# Patient Record
Sex: FEMALE | Race: BLACK OR AFRICAN AMERICAN | NOT HISPANIC OR LATINO | Employment: OTHER | URBAN - METROPOLITAN AREA
[De-identification: names, ages, dates, MRNs, and addresses within clinical notes are randomized per-mention and may not be internally consistent; named-entity substitution may affect disease eponyms.]

---

## 2016-07-06 LAB — CRC RECOMMENDATION EXT: NORMAL

## 2018-01-23 ENCOUNTER — HISTORICAL (OUTPATIENT)
Dept: RADIOLOGY | Facility: HOSPITAL | Age: 68
End: 2018-01-23

## 2018-02-06 ENCOUNTER — HISTORICAL (OUTPATIENT)
Dept: RADIOLOGY | Facility: HOSPITAL | Age: 68
End: 2018-02-06

## 2018-06-19 ENCOUNTER — HISTORICAL (OUTPATIENT)
Dept: CARDIOLOGY | Facility: HOSPITAL | Age: 68
End: 2018-06-19

## 2018-06-19 ENCOUNTER — HISTORICAL (OUTPATIENT)
Dept: ADMINISTRATIVE | Facility: HOSPITAL | Age: 68
End: 2018-06-19

## 2019-04-09 LAB — RAPID GROUP A STREP (OHS): POSITIVE

## 2019-09-11 ENCOUNTER — HISTORICAL (OUTPATIENT)
Dept: RADIOLOGY | Facility: HOSPITAL | Age: 69
End: 2019-09-11

## 2020-12-09 ENCOUNTER — HISTORICAL (OUTPATIENT)
Dept: RADIOLOGY | Facility: HOSPITAL | Age: 70
End: 2020-12-09

## 2021-01-04 ENCOUNTER — HISTORICAL (OUTPATIENT)
Dept: RADIOLOGY | Facility: HOSPITAL | Age: 71
End: 2021-01-04

## 2021-01-14 ENCOUNTER — HISTORICAL (OUTPATIENT)
Dept: RADIOLOGY | Facility: HOSPITAL | Age: 71
End: 2021-01-14

## 2021-02-25 ENCOUNTER — HISTORICAL (OUTPATIENT)
Dept: RADIOLOGY | Facility: HOSPITAL | Age: 71
End: 2021-02-25

## 2021-02-25 LAB — POC CREATININE: 0.9 MG/DL (ref 0.6–1.3)

## 2021-03-04 ENCOUNTER — HISTORICAL (OUTPATIENT)
Dept: RADIOLOGY | Facility: HOSPITAL | Age: 71
End: 2021-03-04

## 2021-03-04 LAB
ABS NEUT (OLG): 4.38 X10(3)/MCL (ref 2.1–9.2)
ALBUMIN SERPL-MCNC: 3.7 GM/DL (ref 3.4–4.8)
ALBUMIN/GLOB SERPL: 1.2 RATIO (ref 1.1–2)
ALP SERPL-CCNC: 109 UNIT/L (ref 40–150)
ALT SERPL-CCNC: 15 UNIT/L (ref 0–55)
AST SERPL-CCNC: 14 UNIT/L (ref 5–34)
BASOPHILS # BLD AUTO: 0 X10(3)/MCL (ref 0–0.2)
BASOPHILS NFR BLD AUTO: 0 %
BILIRUB SERPL-MCNC: 0.5 MG/DL
BILIRUBIN DIRECT+TOT PNL SERPL-MCNC: 0.2 MG/DL (ref 0–0.5)
BILIRUBIN DIRECT+TOT PNL SERPL-MCNC: 0.3 MG/DL (ref 0–0.8)
BUN SERPL-MCNC: 18.1 MG/DL (ref 9.8–20.1)
CALCIUM SERPL-MCNC: 9.3 MG/DL (ref 8.4–10.2)
CHLORIDE SERPL-SCNC: 109 MMOL/L (ref 98–107)
CO2 SERPL-SCNC: 29 MMOL/L (ref 23–31)
CREAT SERPL-MCNC: 0.95 MG/DL (ref 0.55–1.02)
EOSINOPHIL # BLD AUTO: 0.1 X10(3)/MCL (ref 0–0.9)
EOSINOPHIL NFR BLD AUTO: 2 %
ERYTHROCYTE [DISTWIDTH] IN BLOOD BY AUTOMATED COUNT: 15.3 % (ref 11.5–17)
GLOBULIN SER-MCNC: 3.2 GM/DL (ref 2.4–3.5)
GLUCOSE SERPL-MCNC: 107 MG/DL (ref 82–115)
HCT VFR BLD AUTO: 42 % (ref 37–47)
HGB BLD-MCNC: 13 GM/DL (ref 12–16)
LYMPHOCYTES # BLD AUTO: 2.2 X10(3)/MCL (ref 0.6–4.6)
LYMPHOCYTES NFR BLD AUTO: 29 %
MCH RBC QN AUTO: 28.1 PG (ref 27–31)
MCHC RBC AUTO-ENTMCNC: 31 GM/DL (ref 33–36)
MCV RBC AUTO: 90.7 FL (ref 80–94)
MONOCYTES # BLD AUTO: 0.8 X10(3)/MCL (ref 0.1–1.3)
MONOCYTES NFR BLD AUTO: 10 %
NEUTROPHILS # BLD AUTO: 4.38 X10(3)/MCL (ref 2.1–9.2)
NEUTROPHILS NFR BLD AUTO: 59 %
PLATELET # BLD AUTO: 227 X10(3)/MCL (ref 130–400)
PMV BLD AUTO: 11.6 FL (ref 9.4–12.4)
POTASSIUM SERPL-SCNC: 4.9 MMOL/L (ref 3.5–5.1)
PROT SERPL-MCNC: 6.9 GM/DL (ref 5.8–7.6)
RBC # BLD AUTO: 4.63 X10(6)/MCL (ref 4.2–5.4)
SARS-COV-2 RNA RESP QL NAA+PROBE: NOT DETECTED
SODIUM SERPL-SCNC: 148 MMOL/L (ref 136–145)
WBC # SPEC AUTO: 7.5 X10(3)/MCL (ref 4.5–11.5)

## 2021-03-08 ENCOUNTER — HISTORICAL (OUTPATIENT)
Dept: SURGERY | Facility: HOSPITAL | Age: 71
End: 2021-03-08

## 2021-03-19 ENCOUNTER — HISTORICAL (OUTPATIENT)
Dept: RADIATION THERAPY | Facility: HOSPITAL | Age: 71
End: 2021-03-19

## 2021-04-08 ENCOUNTER — HISTORICAL (OUTPATIENT)
Dept: RADIOLOGY | Facility: HOSPITAL | Age: 71
End: 2021-04-08

## 2021-04-08 LAB — BMD RECOMMENDATION EXT: NORMAL

## 2021-04-14 ENCOUNTER — HISTORICAL (OUTPATIENT)
Dept: CARDIOLOGY | Facility: HOSPITAL | Age: 71
End: 2021-04-14

## 2021-04-19 ENCOUNTER — HISTORICAL (OUTPATIENT)
Dept: RADIATION THERAPY | Facility: HOSPITAL | Age: 71
End: 2021-04-19

## 2021-05-12 ENCOUNTER — HISTORICAL (OUTPATIENT)
Dept: ADMINISTRATIVE | Facility: HOSPITAL | Age: 71
End: 2021-05-12

## 2021-05-12 LAB
ABS NEUT (OLG): 6.47 X10(3)/MCL (ref 2.1–9.2)
ALBUMIN SERPL-MCNC: 3.8 GM/DL (ref 3.4–4.8)
ALBUMIN/GLOB SERPL: 1.1 RATIO (ref 1.1–2)
ALP SERPL-CCNC: 114 UNIT/L (ref 40–150)
ALT SERPL-CCNC: 15 UNIT/L (ref 0–55)
AST SERPL-CCNC: 17 UNIT/L (ref 5–34)
BASOPHILS # BLD AUTO: 0 X10(3)/MCL (ref 0–0.2)
BASOPHILS NFR BLD AUTO: 0.1 %
BILIRUB SERPL-MCNC: 0.6 MG/DL
BILIRUBIN DIRECT+TOT PNL SERPL-MCNC: 0.3 MG/DL (ref 0–0.5)
BILIRUBIN DIRECT+TOT PNL SERPL-MCNC: 0.3 MG/DL (ref 0–0.8)
BUN SERPL-MCNC: 18.2 MG/DL (ref 9.8–20.1)
CALCIUM SERPL-MCNC: 10.1 MG/DL (ref 8.4–10.2)
CHLORIDE SERPL-SCNC: 108 MMOL/L (ref 98–107)
CO2 SERPL-SCNC: 25 MMOL/L (ref 23–31)
CREAT SERPL-MCNC: 1.08 MG/DL (ref 0.55–1.02)
EOSINOPHIL # BLD AUTO: 0.2 X10(3)/MCL (ref 0–0.9)
EOSINOPHIL NFR BLD AUTO: 1.9 %
ERYTHROCYTE [DISTWIDTH] IN BLOOD BY AUTOMATED COUNT: 14.2 % (ref 11.5–17)
GLOBULIN SER-MCNC: 3.4 GM/DL (ref 2.4–3.5)
GLUCOSE SERPL-MCNC: 124 MG/DL (ref 82–115)
HCT VFR BLD AUTO: 42.9 % (ref 37–47)
HGB BLD-MCNC: 13.2 GM/DL (ref 12–16)
LYMPHOCYTES # BLD AUTO: 2.8 X10(3)/MCL (ref 0.6–4.6)
LYMPHOCYTES NFR BLD AUTO: 27.1 %
MCH RBC QN AUTO: 27.4 PG (ref 27–31)
MCHC RBC AUTO-ENTMCNC: 30.8 GM/DL (ref 33–36)
MCV RBC AUTO: 89 FL (ref 80–94)
MONOCYTES # BLD AUTO: 0.9 X10(3)/MCL (ref 0.1–1.3)
MONOCYTES NFR BLD AUTO: 8.4 %
NEUTROPHILS # BLD AUTO: 6.5 X10(3)/MCL (ref 2.1–9.2)
NEUTROPHILS NFR BLD AUTO: 62.3 %
PLATELET # BLD AUTO: 152 X10(3)/MCL (ref 130–400)
PMV BLD AUTO: 11.2 FL (ref 9.4–12.4)
POTASSIUM SERPL-SCNC: 4.9 MMOL/L (ref 3.5–5.1)
PROT SERPL-MCNC: 7.2 GM/DL (ref 5.8–7.6)
RBC # BLD AUTO: 4.82 X10(6)/MCL (ref 4.2–5.4)
SODIUM SERPL-SCNC: 144 MMOL/L (ref 136–145)
WBC # SPEC AUTO: 10.4 X10(3)/MCL (ref 4.5–11.5)

## 2021-05-13 ENCOUNTER — HISTORICAL (OUTPATIENT)
Dept: INFUSION THERAPY | Facility: HOSPITAL | Age: 71
End: 2021-05-13

## 2021-05-20 ENCOUNTER — HISTORICAL (OUTPATIENT)
Dept: INFUSION THERAPY | Facility: HOSPITAL | Age: 71
End: 2021-05-20

## 2021-05-20 LAB
ABS NEUT (OLG): 3.4 X10(3)/MCL (ref 2.1–9.2)
ALBUMIN SERPL-MCNC: 3.3 GM/DL (ref 3.4–4.8)
ALBUMIN/GLOB SERPL: 1.1 RATIO (ref 1.1–2)
ALP SERPL-CCNC: 175 UNIT/L (ref 40–150)
ALT SERPL-CCNC: 140 UNIT/L (ref 0–55)
AST SERPL-CCNC: 70 UNIT/L (ref 5–34)
BASOPHILS # BLD AUTO: 0 X10(3)/MCL (ref 0–0.2)
BASOPHILS NFR BLD AUTO: 0.2 %
BILIRUB SERPL-MCNC: 0.5 MG/DL
BILIRUBIN DIRECT+TOT PNL SERPL-MCNC: 0.2 MG/DL (ref 0–0.5)
BILIRUBIN DIRECT+TOT PNL SERPL-MCNC: 0.3 MG/DL (ref 0–0.8)
BUN SERPL-MCNC: 12.5 MG/DL (ref 9.8–20.1)
CALCIUM SERPL-MCNC: 9 MG/DL (ref 8.4–10.2)
CHLORIDE SERPL-SCNC: 105 MMOL/L (ref 98–107)
CO2 SERPL-SCNC: 27 MMOL/L (ref 23–31)
CREAT SERPL-MCNC: 1.05 MG/DL (ref 0.55–1.02)
EOSINOPHIL # BLD AUTO: 0.1 X10(3)/MCL (ref 0–0.9)
EOSINOPHIL NFR BLD AUTO: 1.8 %
ERYTHROCYTE [DISTWIDTH] IN BLOOD BY AUTOMATED COUNT: 13.5 % (ref 11.5–17)
GLOBULIN SER-MCNC: 3 GM/DL (ref 2.4–3.5)
GLUCOSE SERPL-MCNC: 142 MG/DL (ref 82–115)
HCT VFR BLD AUTO: 38.8 % (ref 37–47)
HGB BLD-MCNC: 12.4 GM/DL (ref 12–16)
LYMPHOCYTES # BLD AUTO: 2.3 X10(3)/MCL (ref 0.6–4.6)
LYMPHOCYTES NFR BLD AUTO: 36.5 %
MCH RBC QN AUTO: 27.7 PG (ref 27–31)
MCHC RBC AUTO-ENTMCNC: 32 GM/DL (ref 33–36)
MCV RBC AUTO: 86.8 FL (ref 80–94)
MONOCYTES # BLD AUTO: 0.4 X10(3)/MCL (ref 0.1–1.3)
MONOCYTES NFR BLD AUTO: 7 %
NEUTROPHILS # BLD AUTO: 3.4 X10(3)/MCL (ref 2.1–9.2)
NEUTROPHILS NFR BLD AUTO: 54.2 %
PLATELET # BLD AUTO: 233 X10(3)/MCL (ref 130–400)
PMV BLD AUTO: 11.1 FL (ref 9.4–12.4)
POTASSIUM SERPL-SCNC: 4.5 MMOL/L (ref 3.5–5.1)
PROT SERPL-MCNC: 6.3 GM/DL (ref 5.8–7.6)
RBC # BLD AUTO: 4.47 X10(6)/MCL (ref 4.2–5.4)
SODIUM SERPL-SCNC: 140 MMOL/L (ref 136–145)
WBC # SPEC AUTO: 6.3 X10(3)/MCL (ref 4.5–11.5)

## 2021-05-26 ENCOUNTER — HISTORICAL (OUTPATIENT)
Dept: ADMINISTRATIVE | Facility: HOSPITAL | Age: 71
End: 2021-05-26

## 2021-05-26 LAB
ABS NEUT (OLG): 4.14 X10(3)/MCL (ref 2.1–9.2)
ALBUMIN SERPL-MCNC: 3.4 GM/DL (ref 3.4–4.8)
ALBUMIN/GLOB SERPL: 1.2 RATIO (ref 1.1–2)
ALP SERPL-CCNC: 113 UNIT/L (ref 40–150)
ALT SERPL-CCNC: 50 UNIT/L (ref 0–55)
AST SERPL-CCNC: 24 UNIT/L (ref 5–34)
BASOPHILS # BLD AUTO: 0 X10(3)/MCL (ref 0–0.2)
BASOPHILS NFR BLD AUTO: 0.2 %
BILIRUB SERPL-MCNC: 0.5 MG/DL
BILIRUBIN DIRECT+TOT PNL SERPL-MCNC: 0.2 MG/DL (ref 0–0.8)
BILIRUBIN DIRECT+TOT PNL SERPL-MCNC: 0.3 MG/DL (ref 0–0.5)
BUN SERPL-MCNC: 14.1 MG/DL (ref 9.8–20.1)
CALCIUM SERPL-MCNC: 9.3 MG/DL (ref 8.4–10.2)
CHLORIDE SERPL-SCNC: 103 MMOL/L (ref 98–107)
CO2 SERPL-SCNC: 28 MMOL/L (ref 23–31)
CREAT SERPL-MCNC: 0.9 MG/DL (ref 0.55–1.02)
EOSINOPHIL # BLD AUTO: 0.1 X10(3)/MCL (ref 0–0.9)
EOSINOPHIL NFR BLD AUTO: 1.7 %
ERYTHROCYTE [DISTWIDTH] IN BLOOD BY AUTOMATED COUNT: 13.6 % (ref 11.5–17)
GLOBULIN SER-MCNC: 2.9 GM/DL (ref 2.4–3.5)
GLUCOSE SERPL-MCNC: 183 MG/DL (ref 82–115)
HCT VFR BLD AUTO: 39.2 % (ref 37–47)
HGB BLD-MCNC: 12.7 GM/DL (ref 12–16)
LYMPHOCYTES # BLD AUTO: 1.9 X10(3)/MCL (ref 0.6–4.6)
LYMPHOCYTES NFR BLD AUTO: 29.2 %
MCH RBC QN AUTO: 28.1 PG (ref 27–31)
MCHC RBC AUTO-ENTMCNC: 32.4 GM/DL (ref 33–36)
MCV RBC AUTO: 86.7 FL (ref 80–94)
MONOCYTES # BLD AUTO: 0.3 X10(3)/MCL (ref 0.1–1.3)
MONOCYTES NFR BLD AUTO: 5.2 %
NEUTROPHILS # BLD AUTO: 4.1 X10(3)/MCL (ref 2.1–9.2)
NEUTROPHILS NFR BLD AUTO: 63.1 %
PLATELET # BLD AUTO: 296 X10(3)/MCL (ref 130–400)
PMV BLD AUTO: 10.4 FL (ref 9.4–12.4)
POTASSIUM SERPL-SCNC: 4.7 MMOL/L (ref 3.5–5.1)
PROT SERPL-MCNC: 6.3 GM/DL (ref 5.8–7.6)
RBC # BLD AUTO: 4.52 X10(6)/MCL (ref 4.2–5.4)
SODIUM SERPL-SCNC: 140 MMOL/L (ref 136–145)
WBC # SPEC AUTO: 6.6 X10(3)/MCL (ref 4.5–11.5)

## 2021-05-27 ENCOUNTER — HISTORICAL (OUTPATIENT)
Dept: INFUSION THERAPY | Facility: HOSPITAL | Age: 71
End: 2021-05-27

## 2021-06-03 ENCOUNTER — HISTORICAL (OUTPATIENT)
Dept: INFUSION THERAPY | Facility: HOSPITAL | Age: 71
End: 2021-06-03

## 2021-06-03 LAB
ABS NEUT (OLG): 3.85 X10(3)/MCL (ref 2.1–9.2)
ANION GAP SERPL CALC-SCNC: 13 MMOL/L
BASOPHILS # BLD AUTO: 0 X10(3)/MCL (ref 0–0.2)
BASOPHILS NFR BLD AUTO: 0.3 %
BUN SERPL-MCNC: 12 MG/DL (ref 8–26)
CHLORIDE SERPL-SCNC: 103 MMOL/L (ref 98–109)
CREAT SERPL-MCNC: 1 MG/DL (ref 0.6–1.3)
EOSINOPHIL # BLD AUTO: 0.1 X10(3)/MCL (ref 0–0.9)
EOSINOPHIL NFR BLD AUTO: 1.9 %
ERYTHROCYTE [DISTWIDTH] IN BLOOD BY AUTOMATED COUNT: 14.1 % (ref 11.5–17)
GLUCOSE SERPL-MCNC: 151 MG/DL (ref 70–105)
HCT VFR BLD AUTO: 36.6 % (ref 37–47)
HCT VFR BLD CALC: 38 % (ref 38–51)
HGB BLD-MCNC: 11.9 GM/DL (ref 12–16)
HGB BLD-MCNC: 12.9 MG/DL (ref 12–17)
LYMPHOCYTES # BLD AUTO: 2.2 X10(3)/MCL (ref 0.6–4.6)
LYMPHOCYTES NFR BLD AUTO: 32.8 %
MCH RBC QN AUTO: 27.8 PG (ref 27–31)
MCHC RBC AUTO-ENTMCNC: 32.5 GM/DL (ref 33–36)
MCV RBC AUTO: 85.5 FL (ref 80–94)
MONOCYTES # BLD AUTO: 0.4 X10(3)/MCL (ref 0.1–1.3)
MONOCYTES NFR BLD AUTO: 6.3 %
NEUTROPHILS # BLD AUTO: 3.8 X10(3)/MCL (ref 2.1–9.2)
NEUTROPHILS NFR BLD AUTO: 57.5 %
PLATELET # BLD AUTO: 315 X10(3)/MCL (ref 130–400)
PMV BLD AUTO: 9.5 FL (ref 9.4–12.4)
POC IONIZED CALCIUM: 1.2 MMOL/L (ref 1.12–1.32)
POC TCO2: 29 MMOL/L (ref 24–29)
POTASSIUM BLD-SCNC: 4.5 MMOL/L (ref 3.5–4.9)
RBC # BLD AUTO: 4.28 X10(6)/MCL (ref 4.2–5.4)
SODIUM BLD-SCNC: 139 MMOL/L (ref 138–146)
WBC # SPEC AUTO: 6.7 X10(3)/MCL (ref 4.5–11.5)

## 2021-06-10 ENCOUNTER — HISTORICAL (OUTPATIENT)
Dept: INFUSION THERAPY | Facility: HOSPITAL | Age: 71
End: 2021-06-10

## 2021-06-10 LAB
ABS NEUT (OLG): 5.21 X10(3)/MCL (ref 2.1–9.2)
ALBUMIN SERPL-MCNC: 2.9 GM/DL (ref 3.4–4.8)
ALBUMIN/GLOB SERPL: 0.9 RATIO (ref 1.1–2)
ALP SERPL-CCNC: 148 UNIT/L (ref 40–150)
ALT SERPL-CCNC: 104 UNIT/L (ref 0–55)
AST SERPL-CCNC: 37 UNIT/L (ref 5–34)
BASOPHILS # BLD AUTO: 0 X10(3)/MCL (ref 0–0.2)
BASOPHILS NFR BLD AUTO: 0.2 %
BILIRUB SERPL-MCNC: 0.4 MG/DL
BILIRUBIN DIRECT+TOT PNL SERPL-MCNC: 0.2 MG/DL (ref 0–0.5)
BILIRUBIN DIRECT+TOT PNL SERPL-MCNC: 0.2 MG/DL (ref 0–0.8)
BUN SERPL-MCNC: 12.5 MG/DL (ref 9.8–20.1)
CALCIUM SERPL-MCNC: 9.3 MG/DL (ref 8.4–10.2)
CHLORIDE SERPL-SCNC: 102 MMOL/L (ref 98–107)
CO2 SERPL-SCNC: 25 MMOL/L (ref 23–31)
CREAT SERPL-MCNC: 1.01 MG/DL (ref 0.55–1.02)
EOSINOPHIL # BLD AUTO: 0.1 X10(3)/MCL (ref 0–0.9)
EOSINOPHIL NFR BLD AUTO: 1.7 %
ERYTHROCYTE [DISTWIDTH] IN BLOOD BY AUTOMATED COUNT: 14.5 % (ref 11.5–17)
GLOBULIN SER-MCNC: 3.2 GM/DL (ref 2.4–3.5)
GLUCOSE SERPL-MCNC: 157 MG/DL (ref 82–115)
HCT VFR BLD AUTO: 38 % (ref 37–47)
HGB BLD-MCNC: 12.4 GM/DL (ref 12–16)
LYMPHOCYTES # BLD AUTO: 2.3 X10(3)/MCL (ref 0.6–4.6)
LYMPHOCYTES NFR BLD AUTO: 27 %
MCH RBC QN AUTO: 27.7 PG (ref 27–31)
MCHC RBC AUTO-ENTMCNC: 32.6 GM/DL (ref 33–36)
MCV RBC AUTO: 85 FL (ref 80–94)
MONOCYTES # BLD AUTO: 0.7 X10(3)/MCL (ref 0.1–1.3)
MONOCYTES NFR BLD AUTO: 7.9 %
NEUTROPHILS # BLD AUTO: 5.2 X10(3)/MCL (ref 2.1–9.2)
NEUTROPHILS NFR BLD AUTO: 61.8 %
PLATELET # BLD AUTO: 307 X10(3)/MCL (ref 130–400)
PMV BLD AUTO: 10.7 FL (ref 9.4–12.4)
POTASSIUM SERPL-SCNC: 4.3 MMOL/L (ref 3.5–5.1)
PROT SERPL-MCNC: 6.1 GM/DL (ref 5.8–7.6)
RBC # BLD AUTO: 4.47 X10(6)/MCL (ref 4.2–5.4)
SODIUM SERPL-SCNC: 141 MMOL/L (ref 136–145)
WBC # SPEC AUTO: 8.4 X10(3)/MCL (ref 4.5–11.5)

## 2021-06-16 ENCOUNTER — HISTORICAL (OUTPATIENT)
Dept: ADMINISTRATIVE | Facility: HOSPITAL | Age: 71
End: 2021-06-16

## 2021-06-16 LAB
ABS NEUT (OLG): 2.98 X10(3)/MCL (ref 2.1–9.2)
ALBUMIN SERPL-MCNC: 3 GM/DL (ref 3.4–4.8)
ALBUMIN/GLOB SERPL: 1 RATIO (ref 1.1–2)
ALP SERPL-CCNC: 115 UNIT/L (ref 40–150)
ALT SERPL-CCNC: 46 UNIT/L (ref 0–55)
AST SERPL-CCNC: 26 UNIT/L (ref 5–34)
BASOPHILS # BLD AUTO: 0 X10(3)/MCL (ref 0–0.2)
BASOPHILS NFR BLD AUTO: 0.4 %
BILIRUB SERPL-MCNC: 0.4 MG/DL
BILIRUBIN DIRECT+TOT PNL SERPL-MCNC: 0.2 MG/DL (ref 0–0.5)
BILIRUBIN DIRECT+TOT PNL SERPL-MCNC: 0.2 MG/DL (ref 0–0.8)
BUN SERPL-MCNC: 14.1 MG/DL (ref 9.8–20.1)
CALCIUM SERPL-MCNC: 9.4 MG/DL (ref 8.4–10.2)
CHLORIDE SERPL-SCNC: 104 MMOL/L (ref 98–107)
CO2 SERPL-SCNC: 29 MMOL/L (ref 23–31)
CREAT SERPL-MCNC: 0.84 MG/DL (ref 0.55–1.02)
EOSINOPHIL # BLD AUTO: 0 X10(3)/MCL (ref 0–0.9)
EOSINOPHIL NFR BLD AUTO: 0.8 %
ERYTHROCYTE [DISTWIDTH] IN BLOOD BY AUTOMATED COUNT: 14.7 % (ref 11.5–17)
GLOBULIN SER-MCNC: 3 GM/DL (ref 2.4–3.5)
GLUCOSE SERPL-MCNC: 127 MG/DL (ref 82–115)
HCT VFR BLD AUTO: 36.9 % (ref 37–47)
HGB BLD-MCNC: 12 GM/DL (ref 12–16)
LYMPHOCYTES # BLD AUTO: 1.7 X10(3)/MCL (ref 0.6–4.6)
LYMPHOCYTES NFR BLD AUTO: 32.4 %
MCH RBC QN AUTO: 27.8 PG (ref 27–31)
MCHC RBC AUTO-ENTMCNC: 32.5 GM/DL (ref 33–36)
MCV RBC AUTO: 85.6 FL (ref 80–94)
MONOCYTES # BLD AUTO: 0.5 X10(3)/MCL (ref 0.1–1.3)
MONOCYTES NFR BLD AUTO: 9.3 %
NEUTROPHILS # BLD AUTO: 3 X10(3)/MCL (ref 2.1–9.2)
NEUTROPHILS NFR BLD AUTO: 56.3 %
PLATELET # BLD AUTO: 278 X10(3)/MCL (ref 130–400)
PMV BLD AUTO: 9.7 FL (ref 9.4–12.4)
POTASSIUM SERPL-SCNC: 4.9 MMOL/L (ref 3.5–5.1)
PROT SERPL-MCNC: 6 GM/DL (ref 5.8–7.6)
RBC # BLD AUTO: 4.31 X10(6)/MCL (ref 4.2–5.4)
SODIUM SERPL-SCNC: 140 MMOL/L (ref 136–145)
WBC # SPEC AUTO: 5.3 X10(3)/MCL (ref 4.5–11.5)

## 2021-06-17 ENCOUNTER — HISTORICAL (OUTPATIENT)
Dept: INFUSION THERAPY | Facility: HOSPITAL | Age: 71
End: 2021-06-17

## 2021-06-24 ENCOUNTER — HISTORICAL (OUTPATIENT)
Dept: INFUSION THERAPY | Facility: HOSPITAL | Age: 71
End: 2021-06-24

## 2021-06-24 LAB
ABS NEUT (OLG): 4.04 X10(3)/MCL (ref 2.1–9.2)
ALBUMIN SERPL-MCNC: 3 GM/DL (ref 3.4–4.8)
ALBUMIN/GLOB SERPL: 1.1 RATIO (ref 1.1–2)
ALP SERPL-CCNC: 100 UNIT/L (ref 40–150)
ALT SERPL-CCNC: 34 UNIT/L (ref 0–55)
AST SERPL-CCNC: 23 UNIT/L (ref 5–34)
BASOPHILS # BLD AUTO: 0 X10(3)/MCL (ref 0–0.2)
BASOPHILS NFR BLD AUTO: 0.5 %
BILIRUB SERPL-MCNC: 0.3 MG/DL
BILIRUBIN DIRECT+TOT PNL SERPL-MCNC: 0.1 MG/DL (ref 0–0.8)
BILIRUBIN DIRECT+TOT PNL SERPL-MCNC: 0.2 MG/DL (ref 0–0.5)
BUN SERPL-MCNC: 12.2 MG/DL (ref 9.8–20.1)
CALCIUM SERPL-MCNC: 9.3 MG/DL (ref 8.4–10.2)
CHLORIDE SERPL-SCNC: 106 MMOL/L (ref 98–107)
CO2 SERPL-SCNC: 28 MMOL/L (ref 23–31)
CREAT SERPL-MCNC: 1 MG/DL (ref 0.55–1.02)
EOSINOPHIL # BLD AUTO: 0.1 X10(3)/MCL (ref 0–0.9)
EOSINOPHIL NFR BLD AUTO: 1.2 %
ERYTHROCYTE [DISTWIDTH] IN BLOOD BY AUTOMATED COUNT: 16.2 % (ref 11.5–17)
GLOBULIN SER-MCNC: 2.8 GM/DL (ref 2.4–3.5)
GLUCOSE SERPL-MCNC: 210 MG/DL (ref 82–115)
HCT VFR BLD AUTO: 35.5 % (ref 37–47)
HGB BLD-MCNC: 11.3 GM/DL (ref 12–16)
LYMPHOCYTES # BLD AUTO: 2.5 X10(3)/MCL (ref 0.6–4.6)
LYMPHOCYTES NFR BLD AUTO: 34.2 %
MCH RBC QN AUTO: 27.7 PG (ref 27–31)
MCHC RBC AUTO-ENTMCNC: 31.8 GM/DL (ref 33–36)
MCV RBC AUTO: 87 FL (ref 80–94)
MONOCYTES # BLD AUTO: 0.6 X10(3)/MCL (ref 0.1–1.3)
MONOCYTES NFR BLD AUTO: 7.6 %
NEUTROPHILS # BLD AUTO: 4 X10(3)/MCL (ref 2.1–9.2)
NEUTROPHILS NFR BLD AUTO: 55.5 %
PLATELET # BLD AUTO: 305 X10(3)/MCL (ref 130–400)
PMV BLD AUTO: 9.7 FL (ref 9.4–12.4)
POTASSIUM SERPL-SCNC: 4.8 MMOL/L (ref 3.5–5.1)
PROT SERPL-MCNC: 5.8 GM/DL (ref 5.8–7.6)
RBC # BLD AUTO: 4.08 X10(6)/MCL (ref 4.2–5.4)
SODIUM SERPL-SCNC: 143 MMOL/L (ref 136–145)
WBC # SPEC AUTO: 7.3 X10(3)/MCL (ref 4.5–11.5)

## 2021-07-01 ENCOUNTER — HISTORICAL (OUTPATIENT)
Dept: INFUSION THERAPY | Facility: HOSPITAL | Age: 71
End: 2021-07-01

## 2021-07-01 LAB
ABS NEUT (OLG): 3.49 X10(3)/MCL (ref 2.1–9.2)
ALBUMIN SERPL-MCNC: 3.2 GM/DL (ref 3.4–4.8)
ALBUMIN/GLOB SERPL: 1.2 RATIO (ref 1.1–2)
ALP SERPL-CCNC: 99 UNIT/L (ref 40–150)
ALT SERPL-CCNC: 33 UNIT/L (ref 0–55)
AST SERPL-CCNC: 25 UNIT/L (ref 5–34)
BASOPHILS # BLD AUTO: 0 X10(3)/MCL (ref 0–0.2)
BASOPHILS NFR BLD AUTO: 0.3 %
BILIRUB SERPL-MCNC: 0.3 MG/DL
BILIRUBIN DIRECT+TOT PNL SERPL-MCNC: 0.1 MG/DL (ref 0–0.5)
BILIRUBIN DIRECT+TOT PNL SERPL-MCNC: 0.2 MG/DL (ref 0–0.8)
BUN SERPL-MCNC: 11.2 MG/DL (ref 9.8–20.1)
CALCIUM SERPL-MCNC: 9.6 MG/DL (ref 8.4–10.2)
CHLORIDE SERPL-SCNC: 107 MMOL/L (ref 98–107)
CO2 SERPL-SCNC: 29 MMOL/L (ref 23–31)
CREAT SERPL-MCNC: 0.86 MG/DL (ref 0.55–1.02)
EOSINOPHIL # BLD AUTO: 0.1 X10(3)/MCL (ref 0–0.9)
EOSINOPHIL NFR BLD AUTO: 1.4 %
ERYTHROCYTE [DISTWIDTH] IN BLOOD BY AUTOMATED COUNT: 17.2 % (ref 11.5–17)
GLOBULIN SER-MCNC: 2.7 GM/DL (ref 2.4–3.5)
GLUCOSE SERPL-MCNC: 134 MG/DL (ref 82–115)
HCT VFR BLD AUTO: 34.7 % (ref 37–47)
HGB BLD-MCNC: 11.1 GM/DL (ref 12–16)
LYMPHOCYTES # BLD AUTO: 2.4 X10(3)/MCL (ref 0.6–4.6)
LYMPHOCYTES NFR BLD AUTO: 35.8 %
MCH RBC QN AUTO: 27.6 PG (ref 27–31)
MCHC RBC AUTO-ENTMCNC: 32 GM/DL (ref 33–36)
MCV RBC AUTO: 86.3 FL (ref 80–94)
MONOCYTES # BLD AUTO: 0.6 X10(3)/MCL (ref 0.1–1.3)
MONOCYTES NFR BLD AUTO: 8.8 %
NEUTROPHILS # BLD AUTO: 3.5 X10(3)/MCL (ref 2.1–9.2)
NEUTROPHILS NFR BLD AUTO: 52.9 %
PLATELET # BLD AUTO: 267 X10(3)/MCL (ref 130–400)
PMV BLD AUTO: 9.6 FL (ref 9.4–12.4)
POTASSIUM SERPL-SCNC: 4.6 MMOL/L (ref 3.5–5.1)
PROT SERPL-MCNC: 5.9 GM/DL (ref 5.8–7.6)
RBC # BLD AUTO: 4.02 X10(6)/MCL (ref 4.2–5.4)
SODIUM SERPL-SCNC: 143 MMOL/L (ref 136–145)
WBC # SPEC AUTO: 6.6 X10(3)/MCL (ref 4.5–11.5)

## 2021-07-07 ENCOUNTER — HISTORICAL (OUTPATIENT)
Dept: ADMINISTRATIVE | Facility: HOSPITAL | Age: 71
End: 2021-07-07

## 2021-07-07 LAB
ABS NEUT (OLG): 5.44 X10(3)/MCL (ref 2.1–9.2)
ALBUMIN SERPL-MCNC: 3.2 GM/DL (ref 3.4–4.8)
ALBUMIN/GLOB SERPL: 1.1 RATIO (ref 1.1–2)
ALP SERPL-CCNC: 97 UNIT/L (ref 40–150)
ALT SERPL-CCNC: 35 UNIT/L (ref 0–55)
AST SERPL-CCNC: 25 UNIT/L (ref 5–34)
BASOPHILS # BLD AUTO: 0 X10(3)/MCL (ref 0–0.2)
BASOPHILS NFR BLD AUTO: 0.2 %
BILIRUB SERPL-MCNC: 0.5 MG/DL
BILIRUBIN DIRECT+TOT PNL SERPL-MCNC: 0.2 MG/DL (ref 0–0.5)
BILIRUBIN DIRECT+TOT PNL SERPL-MCNC: 0.3 MG/DL (ref 0–0.8)
BUN SERPL-MCNC: 13.4 MG/DL (ref 9.8–20.1)
CALCIUM SERPL-MCNC: 9.6 MG/DL (ref 8.4–10.2)
CHLORIDE SERPL-SCNC: 106 MMOL/L (ref 98–107)
CO2 SERPL-SCNC: 28 MMOL/L (ref 23–31)
CREAT SERPL-MCNC: 0.95 MG/DL (ref 0.55–1.02)
EOSINOPHIL # BLD AUTO: 0.1 X10(3)/MCL (ref 0–0.9)
EOSINOPHIL NFR BLD AUTO: 0.7 %
ERYTHROCYTE [DISTWIDTH] IN BLOOD BY AUTOMATED COUNT: 18.1 % (ref 11.5–17)
GLOBULIN SER-MCNC: 2.8 GM/DL (ref 2.4–3.5)
GLUCOSE SERPL-MCNC: 120 MG/DL (ref 82–115)
HCT VFR BLD AUTO: 35.2 % (ref 37–47)
HGB BLD-MCNC: 11.3 GM/DL (ref 12–16)
LYMPHOCYTES # BLD AUTO: 3 X10(3)/MCL (ref 0.6–4.6)
LYMPHOCYTES NFR BLD AUTO: 32.9 %
MCH RBC QN AUTO: 27.8 PG (ref 27–31)
MCHC RBC AUTO-ENTMCNC: 32.1 GM/DL (ref 33–36)
MCV RBC AUTO: 86.7 FL (ref 80–94)
MONOCYTES # BLD AUTO: 0.4 X10(3)/MCL (ref 0.1–1.3)
MONOCYTES NFR BLD AUTO: 4.8 %
NEUTROPHILS # BLD AUTO: 5.4 X10(3)/MCL (ref 2.1–9.2)
NEUTROPHILS NFR BLD AUTO: 60.6 %
PLATELET # BLD AUTO: 272 X10(3)/MCL (ref 130–400)
PMV BLD AUTO: 9.6 FL (ref 9.4–12.4)
POTASSIUM SERPL-SCNC: 5 MMOL/L (ref 3.5–5.1)
PROT SERPL-MCNC: 6 GM/DL (ref 5.8–7.6)
RBC # BLD AUTO: 4.06 X10(6)/MCL (ref 4.2–5.4)
SODIUM SERPL-SCNC: 142 MMOL/L (ref 136–145)
WBC # SPEC AUTO: 9 X10(3)/MCL (ref 4.5–11.5)

## 2021-07-08 ENCOUNTER — HISTORICAL (OUTPATIENT)
Dept: INFUSION THERAPY | Facility: HOSPITAL | Age: 71
End: 2021-07-08

## 2021-07-15 ENCOUNTER — HISTORICAL (OUTPATIENT)
Dept: INFUSION THERAPY | Facility: HOSPITAL | Age: 71
End: 2021-07-15

## 2021-07-15 LAB
ABS NEUT (OLG): 3.15 X10(3)/MCL (ref 2.1–9.2)
ANION GAP SERPL CALC-SCNC: 20 MMOL/L
BASOPHILS # BLD AUTO: 0 X10(3)/MCL (ref 0–0.2)
BASOPHILS NFR BLD AUTO: 0.3 %
BUN SERPL-MCNC: 17 MG/DL (ref 8–26)
CHLORIDE SERPL-SCNC: 100 MMOL/L (ref 98–109)
CREAT SERPL-MCNC: 1.2 MG/DL (ref 0.6–1.3)
EOSINOPHIL # BLD AUTO: 0.1 X10(3)/MCL (ref 0–0.9)
EOSINOPHIL NFR BLD AUTO: 1.4 %
ERYTHROCYTE [DISTWIDTH] IN BLOOD BY AUTOMATED COUNT: 17.9 % (ref 11.5–17)
GLUCOSE SERPL-MCNC: 177 MG/DL (ref 70–105)
HCT VFR BLD AUTO: 32.8 % (ref 37–47)
HCT VFR BLD CALC: 34 % (ref 38–51)
HGB BLD-MCNC: 10.8 GM/DL (ref 12–16)
HGB BLD-MCNC: 11.6 MG/DL (ref 12–17)
LYMPHOCYTES # BLD AUTO: 2.8 X10(3)/MCL (ref 0.6–4.6)
LYMPHOCYTES NFR BLD AUTO: 43.6 %
MCH RBC QN AUTO: 28.2 PG (ref 27–31)
MCHC RBC AUTO-ENTMCNC: 32.9 GM/DL (ref 33–36)
MCV RBC AUTO: 85.6 FL (ref 80–94)
MONOCYTES # BLD AUTO: 0.4 X10(3)/MCL (ref 0.1–1.3)
MONOCYTES NFR BLD AUTO: 5.7 %
NEUTROPHILS # BLD AUTO: 3.2 X10(3)/MCL (ref 2.1–9.2)
NEUTROPHILS NFR BLD AUTO: 48.4 %
PLATELET # BLD AUTO: 251 X10(3)/MCL (ref 130–400)
PMV BLD AUTO: 9.9 FL (ref 9.4–12.4)
POC IONIZED CALCIUM: 1.23 MMOL/L (ref 1.12–1.32)
POC TCO2: 25 MMOL/L (ref 24–29)
POTASSIUM BLD-SCNC: 3.9 MMOL/L (ref 3.5–4.9)
RBC # BLD AUTO: 3.83 X10(6)/MCL (ref 4.2–5.4)
SODIUM BLD-SCNC: 140 MMOL/L (ref 138–146)
WBC # SPEC AUTO: 6.5 X10(3)/MCL (ref 4.5–11.5)

## 2021-08-04 ENCOUNTER — HISTORICAL (OUTPATIENT)
Dept: ADMINISTRATIVE | Facility: HOSPITAL | Age: 71
End: 2021-08-04

## 2021-08-04 LAB
ABS NEUT (OLG): 4.59 X10(3)/MCL (ref 2.1–9.2)
ANION GAP SERPL CALC-SCNC: 17 MMOL/L
BASOPHILS # BLD AUTO: 0 X10(3)/MCL (ref 0–0.2)
BASOPHILS NFR BLD AUTO: 0.4 %
BUN SERPL-MCNC: 19 MG/DL (ref 8–26)
CHLORIDE SERPL-SCNC: 104 MMOL/L (ref 98–109)
CREAT SERPL-MCNC: 0.9 MG/DL (ref 0.6–1.3)
EOSINOPHIL # BLD AUTO: 0.2 X10(3)/MCL (ref 0–0.9)
EOSINOPHIL NFR BLD AUTO: 2 %
ERYTHROCYTE [DISTWIDTH] IN BLOOD BY AUTOMATED COUNT: 18.5 % (ref 11.5–17)
GLUCOSE SERPL-MCNC: 157 MG/DL (ref 70–105)
HCT VFR BLD AUTO: 33.7 % (ref 37–47)
HCT VFR BLD CALC: 33 % (ref 38–51)
HGB BLD-MCNC: 10.6 GM/DL (ref 12–16)
HGB BLD-MCNC: 11.2 MG/DL (ref 12–17)
LYMPHOCYTES # BLD AUTO: 3.6 X10(3)/MCL (ref 0.6–4.6)
LYMPHOCYTES NFR BLD AUTO: 38.3 %
MCH RBC QN AUTO: 28 PG (ref 27–31)
MCHC RBC AUTO-ENTMCNC: 31.5 GM/DL (ref 33–36)
MCV RBC AUTO: 89.2 FL (ref 80–94)
MONOCYTES # BLD AUTO: 0.9 X10(3)/MCL (ref 0.1–1.3)
MONOCYTES NFR BLD AUTO: 9.5 %
NEUTROPHILS # BLD AUTO: 4.6 X10(3)/MCL (ref 2.1–9.2)
NEUTROPHILS NFR BLD AUTO: 49.5 %
PLATELET # BLD AUTO: 305 X10(3)/MCL (ref 130–400)
PMV BLD AUTO: 8.7 FL (ref 9.4–12.4)
POC IONIZED CALCIUM: 1.24 MMOL/L (ref 1.12–1.32)
POC TCO2: 27 MMOL/L (ref 24–29)
POTASSIUM BLD-SCNC: 4 MMOL/L (ref 3.5–4.9)
RBC # BLD AUTO: 3.78 X10(6)/MCL (ref 4.2–5.4)
SODIUM BLD-SCNC: 143 MMOL/L (ref 138–146)
WBC # SPEC AUTO: 9.3 X10(3)/MCL (ref 4.5–11.5)

## 2021-08-09 ENCOUNTER — HISTORICAL (OUTPATIENT)
Dept: RADIATION THERAPY | Facility: HOSPITAL | Age: 71
End: 2021-08-09

## 2021-08-12 ENCOUNTER — HISTORICAL (OUTPATIENT)
Dept: INFUSION THERAPY | Facility: HOSPITAL | Age: 71
End: 2021-08-12

## 2021-08-19 ENCOUNTER — HISTORICAL (OUTPATIENT)
Dept: INFUSION THERAPY | Facility: HOSPITAL | Age: 71
End: 2021-08-19

## 2021-08-19 LAB
ABS NEUT (OLG): 4.97 X10(3)/MCL (ref 2.1–9.2)
ALBUMIN SERPL-MCNC: 3.5 GM/DL (ref 3.4–4.8)
ALBUMIN/GLOB SERPL: 1.2 RATIO (ref 1.1–2)
ALP SERPL-CCNC: 91 UNIT/L (ref 40–150)
ALT SERPL-CCNC: 26 UNIT/L (ref 0–55)
AST SERPL-CCNC: 20 UNIT/L (ref 5–34)
BASOPHILS # BLD AUTO: 0 X10(3)/MCL (ref 0–0.2)
BASOPHILS NFR BLD AUTO: 0.2 %
BILIRUB SERPL-MCNC: 0.7 MG/DL
BILIRUBIN DIRECT+TOT PNL SERPL-MCNC: 0.3 MG/DL (ref 0–0.5)
BILIRUBIN DIRECT+TOT PNL SERPL-MCNC: 0.4 MG/DL (ref 0–0.8)
BUN SERPL-MCNC: 18.1 MG/DL (ref 9.8–20.1)
CALCIUM SERPL-MCNC: 10 MG/DL (ref 8.4–10.2)
CHLORIDE SERPL-SCNC: 105 MMOL/L (ref 98–107)
CO2 SERPL-SCNC: 26 MMOL/L (ref 23–31)
CREAT SERPL-MCNC: 1.14 MG/DL (ref 0.55–1.02)
EOSINOPHIL # BLD AUTO: 0.2 X10(3)/MCL (ref 0–0.9)
EOSINOPHIL NFR BLD AUTO: 2.9 %
ERYTHROCYTE [DISTWIDTH] IN BLOOD BY AUTOMATED COUNT: 17.7 % (ref 11.5–17)
GLOBULIN SER-MCNC: 3 GM/DL (ref 2.4–3.5)
GLUCOSE SERPL-MCNC: 148 MG/DL (ref 82–115)
HCT VFR BLD AUTO: 33.4 % (ref 37–47)
HGB BLD-MCNC: 10.3 GM/DL (ref 12–16)
LYMPHOCYTES # BLD AUTO: 2.8 X10(3)/MCL (ref 0.6–4.6)
LYMPHOCYTES NFR BLD AUTO: 32.4 %
MCH RBC QN AUTO: 28.5 PG (ref 27–31)
MCHC RBC AUTO-ENTMCNC: 30.8 GM/DL (ref 33–36)
MCV RBC AUTO: 92.3 FL (ref 80–94)
MONOCYTES # BLD AUTO: 0.6 X10(3)/MCL (ref 0.1–1.3)
MONOCYTES NFR BLD AUTO: 6.7 %
NEUTROPHILS # BLD AUTO: 5 X10(3)/MCL (ref 2.1–9.2)
NEUTROPHILS NFR BLD AUTO: 57.2 %
PLATELET # BLD AUTO: 276 X10(3)/MCL (ref 130–400)
PMV BLD AUTO: 9.7 FL (ref 9.4–12.4)
POTASSIUM SERPL-SCNC: 5.2 MMOL/L (ref 3.5–5.1)
PROT SERPL-MCNC: 6.5 GM/DL (ref 5.8–7.6)
RBC # BLD AUTO: 3.62 X10(6)/MCL (ref 4.2–5.4)
SODIUM SERPL-SCNC: 142 MMOL/L (ref 136–145)
WBC # SPEC AUTO: 8.7 X10(3)/MCL (ref 4.5–11.5)

## 2021-08-23 ENCOUNTER — HISTORICAL (OUTPATIENT)
Dept: RADIATION THERAPY | Facility: HOSPITAL | Age: 71
End: 2021-08-23

## 2021-08-25 ENCOUNTER — HISTORICAL (OUTPATIENT)
Dept: RADIATION THERAPY | Facility: HOSPITAL | Age: 71
End: 2021-08-25

## 2021-08-31 ENCOUNTER — HISTORICAL (OUTPATIENT)
Dept: CARDIOLOGY | Facility: HOSPITAL | Age: 71
End: 2021-08-31

## 2021-09-01 ENCOUNTER — HISTORICAL (OUTPATIENT)
Dept: ADMINISTRATIVE | Facility: HOSPITAL | Age: 71
End: 2021-09-01

## 2021-09-01 LAB
ABS NEUT (OLG): 5.44 X10(3)/MCL (ref 2.1–9.2)
ALBUMIN SERPL-MCNC: 3.4 GM/DL (ref 3.4–4.8)
ALBUMIN/GLOB SERPL: 1.1 RATIO (ref 1.1–2)
ALP SERPL-CCNC: 97 UNIT/L (ref 40–150)
ALT SERPL-CCNC: 24 UNIT/L (ref 0–55)
AST SERPL-CCNC: 20 UNIT/L (ref 5–34)
BASOPHILS # BLD AUTO: 0 X10(3)/MCL (ref 0–0.2)
BASOPHILS NFR BLD AUTO: 0.1 %
BILIRUB SERPL-MCNC: 0.4 MG/DL
BILIRUBIN DIRECT+TOT PNL SERPL-MCNC: 0.2 MG/DL (ref 0–0.5)
BILIRUBIN DIRECT+TOT PNL SERPL-MCNC: 0.2 MG/DL (ref 0–0.8)
BUN SERPL-MCNC: 19.3 MG/DL (ref 9.8–20.1)
CALCIUM SERPL-MCNC: 9.7 MG/DL (ref 8.4–10.2)
CHLORIDE SERPL-SCNC: 107 MMOL/L (ref 98–107)
CO2 SERPL-SCNC: 27 MMOL/L (ref 23–31)
CREAT SERPL-MCNC: 1.07 MG/DL (ref 0.55–1.02)
EOSINOPHIL # BLD AUTO: 0.1 X10(3)/MCL (ref 0–0.9)
EOSINOPHIL NFR BLD AUTO: 1 %
ERYTHROCYTE [DISTWIDTH] IN BLOOD BY AUTOMATED COUNT: 17 % (ref 11.5–17)
GLOBULIN SER-MCNC: 3 GM/DL (ref 2.4–3.5)
GLUCOSE SERPL-MCNC: 135 MG/DL (ref 82–115)
HCT VFR BLD AUTO: 34.4 % (ref 37–47)
HGB BLD-MCNC: 10.4 GM/DL (ref 12–16)
LYMPHOCYTES # BLD AUTO: 3.1 X10(3)/MCL (ref 0.6–4.6)
LYMPHOCYTES NFR BLD AUTO: 31.8 %
MCH RBC QN AUTO: 27.9 PG (ref 27–31)
MCHC RBC AUTO-ENTMCNC: 30.2 GM/DL (ref 33–36)
MCV RBC AUTO: 92.2 FL (ref 80–94)
MONOCYTES # BLD AUTO: 1 X10(3)/MCL (ref 0.1–1.3)
MONOCYTES NFR BLD AUTO: 10.5 %
NEUTROPHILS # BLD AUTO: 5.4 X10(3)/MCL (ref 2.1–9.2)
NEUTROPHILS NFR BLD AUTO: 56.4 %
PLATELET # BLD AUTO: 331 X10(3)/MCL (ref 130–400)
PMV BLD AUTO: 9.1 FL (ref 9.4–12.4)
POTASSIUM SERPL-SCNC: 4.4 MMOL/L (ref 3.5–5.1)
PROT SERPL-MCNC: 6.4 GM/DL (ref 5.8–7.6)
RBC # BLD AUTO: 3.73 X10(6)/MCL (ref 4.2–5.4)
SODIUM SERPL-SCNC: 143 MMOL/L (ref 136–145)
WBC # SPEC AUTO: 9.6 X10(3)/MCL (ref 4.5–11.5)

## 2021-09-02 ENCOUNTER — HISTORICAL (OUTPATIENT)
Dept: RADIATION THERAPY | Facility: HOSPITAL | Age: 71
End: 2021-09-02

## 2021-09-03 ENCOUNTER — HISTORICAL (OUTPATIENT)
Dept: RADIATION THERAPY | Facility: HOSPITAL | Age: 71
End: 2021-09-03

## 2021-09-07 ENCOUNTER — HISTORICAL (OUTPATIENT)
Dept: RADIATION THERAPY | Facility: HOSPITAL | Age: 71
End: 2021-09-07

## 2021-09-08 ENCOUNTER — HISTORICAL (OUTPATIENT)
Dept: RADIATION THERAPY | Facility: HOSPITAL | Age: 71
End: 2021-09-08

## 2021-09-09 ENCOUNTER — HISTORICAL (OUTPATIENT)
Dept: RADIATION THERAPY | Facility: HOSPITAL | Age: 71
End: 2021-09-09

## 2021-09-09 ENCOUNTER — HISTORICAL (OUTPATIENT)
Dept: INFUSION THERAPY | Facility: HOSPITAL | Age: 71
End: 2021-09-09

## 2021-09-10 ENCOUNTER — HISTORICAL (OUTPATIENT)
Dept: RADIATION THERAPY | Facility: HOSPITAL | Age: 71
End: 2021-09-10

## 2021-09-13 ENCOUNTER — HISTORICAL (OUTPATIENT)
Dept: RADIATION THERAPY | Facility: HOSPITAL | Age: 71
End: 2021-09-13

## 2021-09-14 ENCOUNTER — HISTORICAL (OUTPATIENT)
Dept: RADIATION THERAPY | Facility: HOSPITAL | Age: 71
End: 2021-09-14

## 2021-09-15 ENCOUNTER — HISTORICAL (OUTPATIENT)
Dept: RADIATION THERAPY | Facility: HOSPITAL | Age: 71
End: 2021-09-15

## 2021-09-16 ENCOUNTER — HISTORICAL (OUTPATIENT)
Dept: RADIATION THERAPY | Facility: HOSPITAL | Age: 71
End: 2021-09-16

## 2021-09-17 ENCOUNTER — HISTORICAL (OUTPATIENT)
Dept: RADIATION THERAPY | Facility: HOSPITAL | Age: 71
End: 2021-09-17

## 2021-09-20 ENCOUNTER — HISTORICAL (OUTPATIENT)
Dept: RADIATION THERAPY | Facility: HOSPITAL | Age: 71
End: 2021-09-20

## 2021-09-21 ENCOUNTER — HISTORICAL (OUTPATIENT)
Dept: RADIATION THERAPY | Facility: HOSPITAL | Age: 71
End: 2021-09-21

## 2021-09-22 ENCOUNTER — HISTORICAL (OUTPATIENT)
Dept: RADIATION THERAPY | Facility: HOSPITAL | Age: 71
End: 2021-09-22

## 2021-09-23 ENCOUNTER — HISTORICAL (OUTPATIENT)
Dept: RADIATION THERAPY | Facility: HOSPITAL | Age: 71
End: 2021-09-23

## 2021-09-24 ENCOUNTER — HISTORICAL (OUTPATIENT)
Dept: RADIATION THERAPY | Facility: HOSPITAL | Age: 71
End: 2021-09-24

## 2021-09-27 ENCOUNTER — HISTORICAL (OUTPATIENT)
Dept: RADIATION THERAPY | Facility: HOSPITAL | Age: 71
End: 2021-09-27

## 2021-09-28 ENCOUNTER — HISTORICAL (OUTPATIENT)
Dept: RADIATION THERAPY | Facility: HOSPITAL | Age: 71
End: 2021-09-28

## 2021-09-29 ENCOUNTER — HISTORICAL (OUTPATIENT)
Dept: ADMINISTRATIVE | Facility: HOSPITAL | Age: 71
End: 2021-09-29

## 2021-09-29 ENCOUNTER — HISTORICAL (OUTPATIENT)
Dept: RADIATION THERAPY | Facility: HOSPITAL | Age: 71
End: 2021-09-29

## 2021-09-29 LAB
ABS NEUT (OLG): 5.09 X10(3)/MCL (ref 2.1–9.2)
ALBUMIN SERPL-MCNC: 3.7 GM/DL (ref 3.4–4.8)
ALBUMIN/GLOB SERPL: 1.3 RATIO (ref 1.1–2)
ALP SERPL-CCNC: 103 UNIT/L (ref 40–150)
ALT SERPL-CCNC: 26 UNIT/L (ref 0–55)
AST SERPL-CCNC: 26 UNIT/L (ref 5–34)
BASOPHILS # BLD AUTO: 0 X10(3)/MCL (ref 0–0.2)
BASOPHILS NFR BLD AUTO: 0.3 %
BILIRUB SERPL-MCNC: 0.4 MG/DL
BILIRUBIN DIRECT+TOT PNL SERPL-MCNC: 0.2 MG/DL (ref 0–0.5)
BILIRUBIN DIRECT+TOT PNL SERPL-MCNC: 0.2 MG/DL (ref 0–0.8)
BUN SERPL-MCNC: 19.9 MG/DL (ref 9.8–20.1)
CALCIUM SERPL-MCNC: 9.8 MG/DL (ref 8.4–10.2)
CHLORIDE SERPL-SCNC: 108 MMOL/L (ref 98–107)
CO2 SERPL-SCNC: 27 MMOL/L (ref 23–31)
CREAT SERPL-MCNC: 1.1 MG/DL (ref 0.55–1.02)
EOSINOPHIL # BLD AUTO: 0.2 X10(3)/MCL (ref 0–0.9)
EOSINOPHIL NFR BLD AUTO: 2.7 %
ERYTHROCYTE [DISTWIDTH] IN BLOOD BY AUTOMATED COUNT: 15.7 % (ref 11.5–17)
GLOBULIN SER-MCNC: 2.9 GM/DL (ref 2.4–3.5)
GLUCOSE SERPL-MCNC: 131 MG/DL (ref 82–115)
HCT VFR BLD AUTO: 37.7 % (ref 37–47)
HGB BLD-MCNC: 11.7 GM/DL (ref 12–16)
LYMPHOCYTES # BLD AUTO: 1.3 X10(3)/MCL (ref 0.6–4.6)
LYMPHOCYTES NFR BLD AUTO: 17.8 %
MCH RBC QN AUTO: 28 PG (ref 27–31)
MCHC RBC AUTO-ENTMCNC: 31 GM/DL (ref 33–36)
MCV RBC AUTO: 90.2 FL (ref 80–94)
MONOCYTES # BLD AUTO: 0.8 X10(3)/MCL (ref 0.1–1.3)
MONOCYTES NFR BLD AUTO: 10.2 %
NEUTROPHILS # BLD AUTO: 5.1 X10(3)/MCL (ref 2.1–9.2)
NEUTROPHILS NFR BLD AUTO: 68.9 %
PLATELET # BLD AUTO: 213 X10(3)/MCL (ref 130–400)
PMV BLD AUTO: 9.6 FL (ref 9.4–12.4)
POTASSIUM SERPL-SCNC: 4.3 MMOL/L (ref 3.5–5.1)
PROT SERPL-MCNC: 6.6 GM/DL (ref 5.8–7.6)
RBC # BLD AUTO: 4.18 X10(6)/MCL (ref 4.2–5.4)
SODIUM SERPL-SCNC: 143 MMOL/L (ref 136–145)
WBC # SPEC AUTO: 7.4 X10(3)/MCL (ref 4.5–11.5)

## 2021-09-30 ENCOUNTER — HISTORICAL (OUTPATIENT)
Dept: RADIATION THERAPY | Facility: HOSPITAL | Age: 71
End: 2021-09-30

## 2021-09-30 ENCOUNTER — HISTORICAL (OUTPATIENT)
Dept: INFUSION THERAPY | Facility: HOSPITAL | Age: 71
End: 2021-09-30

## 2021-10-04 ENCOUNTER — HISTORICAL (OUTPATIENT)
Dept: RADIATION THERAPY | Facility: HOSPITAL | Age: 71
End: 2021-10-04

## 2021-10-20 ENCOUNTER — HISTORICAL (OUTPATIENT)
Dept: ADMINISTRATIVE | Facility: HOSPITAL | Age: 71
End: 2021-10-20

## 2021-10-20 LAB
ABS NEUT (OLG): 5.18 X10(3)/MCL (ref 2.1–9.2)
ALBUMIN SERPL-MCNC: 3.5 GM/DL (ref 3.4–4.8)
ALBUMIN/GLOB SERPL: 1.2 RATIO (ref 1.1–2)
ALP SERPL-CCNC: 108 UNIT/L (ref 40–150)
ALT SERPL-CCNC: 18 UNIT/L (ref 0–55)
AST SERPL-CCNC: 21 UNIT/L (ref 5–34)
BASOPHILS # BLD AUTO: 0 X10(3)/MCL (ref 0–0.2)
BASOPHILS NFR BLD AUTO: 0.4 %
BILIRUB SERPL-MCNC: <0.5 MG/DL
BILIRUBIN DIRECT+TOT PNL SERPL-MCNC: 0.2 MG/DL (ref 0–0.5)
BILIRUBIN DIRECT+TOT PNL SERPL-MCNC: <0.3 MG/DL (ref 0–0.8)
BUN SERPL-MCNC: 19.5 MG/DL (ref 9.8–20.1)
CALCIUM SERPL-MCNC: 9.9 MG/DL (ref 8.7–10.5)
CHLORIDE SERPL-SCNC: 108 MMOL/L (ref 98–107)
CO2 SERPL-SCNC: 29 MMOL/L (ref 23–31)
CREAT SERPL-MCNC: 1.06 MG/DL (ref 0.55–1.02)
EOSINOPHIL # BLD AUTO: 0.2 X10(3)/MCL (ref 0–0.9)
EOSINOPHIL NFR BLD AUTO: 2.2 %
ERYTHROCYTE [DISTWIDTH] IN BLOOD BY AUTOMATED COUNT: 14.9 % (ref 11.5–17)
GLOBULIN SER-MCNC: 3 GM/DL (ref 2.4–3.5)
GLUCOSE SERPL-MCNC: 109 MG/DL (ref 82–115)
HCT VFR BLD AUTO: 38.4 % (ref 37–47)
HGB BLD-MCNC: 11.8 GM/DL (ref 12–16)
LYMPHOCYTES # BLD AUTO: 1.8 X10(3)/MCL (ref 0.6–4.6)
LYMPHOCYTES NFR BLD AUTO: 22.8 %
MCH RBC QN AUTO: 27 PG (ref 27–31)
MCHC RBC AUTO-ENTMCNC: 30.7 GM/DL (ref 33–36)
MCV RBC AUTO: 87.9 FL (ref 80–94)
MONOCYTES # BLD AUTO: 0.7 X10(3)/MCL (ref 0.1–1.3)
MONOCYTES NFR BLD AUTO: 8.5 %
NEUTROPHILS # BLD AUTO: 5.2 X10(3)/MCL (ref 2.1–9.2)
NEUTROPHILS NFR BLD AUTO: 65.8 %
PLATELET # BLD AUTO: 173 X10(3)/MCL (ref 130–400)
PMV BLD AUTO: 11.4 FL (ref 9.4–12.4)
POTASSIUM SERPL-SCNC: 4.2 MMOL/L (ref 3.5–5.1)
PROT SERPL-MCNC: 6.5 GM/DL (ref 5.8–7.6)
RBC # BLD AUTO: 4.37 X10(6)/MCL (ref 4.2–5.4)
SODIUM SERPL-SCNC: 144 MMOL/L (ref 136–145)
WBC # SPEC AUTO: 7.9 X10(3)/MCL (ref 4.5–11.5)

## 2021-10-21 ENCOUNTER — HISTORICAL (OUTPATIENT)
Dept: INFUSION THERAPY | Facility: HOSPITAL | Age: 71
End: 2021-10-21

## 2021-11-08 ENCOUNTER — HISTORICAL (OUTPATIENT)
Dept: RADIOLOGY | Facility: HOSPITAL | Age: 71
End: 2021-11-08

## 2021-11-10 ENCOUNTER — HISTORICAL (OUTPATIENT)
Dept: ADMINISTRATIVE | Facility: HOSPITAL | Age: 71
End: 2021-11-10

## 2021-11-10 LAB
ABS NEUT (OLG): 4.24 X10(3)/MCL (ref 2.1–9.2)
ALBUMIN SERPL-MCNC: 3.6 GM/DL (ref 3.4–4.8)
ALBUMIN/GLOB SERPL: 1.2 RATIO (ref 1.1–2)
ALP SERPL-CCNC: 95 UNIT/L (ref 40–150)
ALT SERPL-CCNC: 14 UNIT/L (ref 0–55)
AST SERPL-CCNC: 19 UNIT/L (ref 5–34)
BASOPHILS # BLD AUTO: 0 X10(3)/MCL (ref 0–0.2)
BASOPHILS NFR BLD AUTO: 0.3 %
BILIRUB SERPL-MCNC: 0.3 MG/DL
BILIRUBIN DIRECT+TOT PNL SERPL-MCNC: 0.1 MG/DL (ref 0–0.8)
BILIRUBIN DIRECT+TOT PNL SERPL-MCNC: 0.2 MG/DL (ref 0–0.5)
BUN SERPL-MCNC: 21.8 MG/DL (ref 9.8–20.1)
CALCIUM SERPL-MCNC: 9 MG/DL (ref 8.7–10.5)
CHLORIDE SERPL-SCNC: 104 MMOL/L (ref 98–107)
CO2 SERPL-SCNC: 28 MMOL/L (ref 23–31)
CREAT SERPL-MCNC: 1.09 MG/DL (ref 0.55–1.02)
EOSINOPHIL # BLD AUTO: 0.2 X10(3)/MCL (ref 0–0.9)
EOSINOPHIL NFR BLD AUTO: 2.6 %
ERYTHROCYTE [DISTWIDTH] IN BLOOD BY AUTOMATED COUNT: 15.2 % (ref 11.5–17)
GLOBULIN SER-MCNC: 3 GM/DL (ref 2.4–3.5)
GLUCOSE SERPL-MCNC: 101 MG/DL (ref 82–115)
HCT VFR BLD AUTO: 37.9 % (ref 37–47)
HGB BLD-MCNC: 12.1 GM/DL (ref 12–16)
LYMPHOCYTES # BLD AUTO: 2.3 X10(3)/MCL (ref 0.6–4.6)
LYMPHOCYTES NFR BLD AUTO: 31.2 %
MCH RBC QN AUTO: 27 PG (ref 27–31)
MCHC RBC AUTO-ENTMCNC: 31.9 GM/DL (ref 33–36)
MCV RBC AUTO: 84.6 FL (ref 80–94)
MONOCYTES # BLD AUTO: 0.6 X10(3)/MCL (ref 0.1–1.3)
MONOCYTES NFR BLD AUTO: 8.6 %
NEUTROPHILS # BLD AUTO: 4.2 X10(3)/MCL (ref 2.1–9.2)
NEUTROPHILS NFR BLD AUTO: 57.2 %
PLATELET # BLD AUTO: 212 X10(3)/MCL (ref 130–400)
PMV BLD AUTO: 10.2 FL (ref 9.4–12.4)
POTASSIUM SERPL-SCNC: 3.7 MMOL/L (ref 3.5–5.1)
PROT SERPL-MCNC: 6.6 GM/DL (ref 5.8–7.6)
RBC # BLD AUTO: 4.48 X10(6)/MCL (ref 4.2–5.4)
SODIUM SERPL-SCNC: 141 MMOL/L (ref 136–145)
WBC # SPEC AUTO: 7.4 X10(3)/MCL (ref 4.5–11.5)

## 2021-11-11 ENCOUNTER — HISTORICAL (OUTPATIENT)
Dept: INFUSION THERAPY | Facility: HOSPITAL | Age: 71
End: 2021-11-11

## 2021-11-29 ENCOUNTER — HISTORICAL (OUTPATIENT)
Dept: CARDIOLOGY | Facility: HOSPITAL | Age: 71
End: 2021-11-29

## 2021-12-01 ENCOUNTER — HISTORICAL (OUTPATIENT)
Dept: ADMINISTRATIVE | Facility: HOSPITAL | Age: 71
End: 2021-12-01

## 2021-12-01 LAB
ABS NEUT (OLG): 4.33 X10(3)/MCL (ref 2.1–9.2)
ALBUMIN SERPL-MCNC: 3.4 GM/DL (ref 3.4–4.8)
ALBUMIN/GLOB SERPL: 1.1 RATIO (ref 1.1–2)
ALP SERPL-CCNC: 103 UNIT/L (ref 40–150)
ALT SERPL-CCNC: 12 UNIT/L (ref 0–55)
AST SERPL-CCNC: 16 UNIT/L (ref 5–34)
BASOPHILS # BLD AUTO: 0 X10(3)/MCL (ref 0–0.2)
BASOPHILS NFR BLD AUTO: 0.1 %
BILIRUB SERPL-MCNC: 0.4 MG/DL
BILIRUBIN DIRECT+TOT PNL SERPL-MCNC: 0.2 MG/DL (ref 0–0.5)
BILIRUBIN DIRECT+TOT PNL SERPL-MCNC: 0.2 MG/DL (ref 0–0.8)
BUN SERPL-MCNC: 20 MG/DL (ref 9.8–20.1)
CALCIUM SERPL-MCNC: 9.1 MG/DL (ref 8.7–10.5)
CHLORIDE SERPL-SCNC: 107 MMOL/L (ref 98–107)
CO2 SERPL-SCNC: 28 MMOL/L (ref 23–31)
CREAT SERPL-MCNC: 1.06 MG/DL (ref 0.55–1.02)
DEPRECATED CALCIDIOL+CALCIFEROL SERPL-MC: 35.5 NG/ML (ref 30–80)
EOSINOPHIL # BLD AUTO: 0.1 X10(3)/MCL (ref 0–0.9)
EOSINOPHIL NFR BLD AUTO: 2.1 %
ERYTHROCYTE [DISTWIDTH] IN BLOOD BY AUTOMATED COUNT: 15.6 % (ref 11.5–17)
GLOBULIN SER-MCNC: 3.1 GM/DL (ref 2.4–3.5)
GLUCOSE SERPL-MCNC: 144 MG/DL (ref 82–115)
HCT VFR BLD AUTO: 37.8 % (ref 37–47)
HGB BLD-MCNC: 11.9 GM/DL (ref 12–16)
LYMPHOCYTES # BLD AUTO: 1.7 X10(3)/MCL (ref 0.6–4.6)
LYMPHOCYTES NFR BLD AUTO: 24.5 %
MCH RBC QN AUTO: 26.6 PG (ref 27–31)
MCHC RBC AUTO-ENTMCNC: 31.5 GM/DL (ref 33–36)
MCV RBC AUTO: 84.4 FL (ref 80–94)
MONOCYTES # BLD AUTO: 0.6 X10(3)/MCL (ref 0.1–1.3)
MONOCYTES NFR BLD AUTO: 9.5 %
NEUTROPHILS # BLD AUTO: 4.3 X10(3)/MCL (ref 2.1–9.2)
NEUTROPHILS NFR BLD AUTO: 63.7 %
PLATELET # BLD AUTO: 90 X10(3)/MCL (ref 130–400)
PMV BLD AUTO: 11.5 FL (ref 9.4–12.4)
POTASSIUM SERPL-SCNC: 4.5 MMOL/L (ref 3.5–5.1)
PROT SERPL-MCNC: 6.5 GM/DL (ref 5.8–7.6)
RBC # BLD AUTO: 4.48 X10(6)/MCL (ref 4.2–5.4)
SODIUM SERPL-SCNC: 142 MMOL/L (ref 136–145)
WBC # SPEC AUTO: 6.8 X10(3)/MCL (ref 4.5–11.5)

## 2021-12-02 ENCOUNTER — HISTORICAL (OUTPATIENT)
Dept: INFUSION THERAPY | Facility: HOSPITAL | Age: 71
End: 2021-12-02

## 2021-12-22 ENCOUNTER — HISTORICAL (OUTPATIENT)
Dept: ADMINISTRATIVE | Facility: HOSPITAL | Age: 71
End: 2021-12-22

## 2021-12-22 LAB
ABS NEUT (OLG): 5.03 X10(3)/MCL (ref 2.1–9.2)
ALBUMIN SERPL-MCNC: 3.6 GM/DL (ref 3.4–4.8)
ALBUMIN/GLOB SERPL: 1.2 RATIO (ref 1.1–2)
ALP SERPL-CCNC: 111 UNIT/L (ref 40–150)
ALT SERPL-CCNC: 12 UNIT/L (ref 0–55)
AST SERPL-CCNC: 17 UNIT/L (ref 5–34)
BASOPHILS # BLD AUTO: 0 X10(3)/MCL (ref 0–0.2)
BASOPHILS NFR BLD AUTO: 0.1 %
BILIRUB SERPL-MCNC: 0.5 MG/DL
BILIRUBIN DIRECT+TOT PNL SERPL-MCNC: 0.2 MG/DL (ref 0–0.8)
BILIRUBIN DIRECT+TOT PNL SERPL-MCNC: 0.3 MG/DL (ref 0–0.5)
BUN SERPL-MCNC: 20.1 MG/DL (ref 9.8–20.1)
CALCIUM SERPL-MCNC: 9.7 MG/DL (ref 8.7–10.5)
CHLORIDE SERPL-SCNC: 106 MMOL/L (ref 98–107)
CO2 SERPL-SCNC: 29 MMOL/L (ref 23–31)
CREAT SERPL-MCNC: 1.17 MG/DL (ref 0.55–1.02)
EOSINOPHIL # BLD AUTO: 0.1 X10(3)/MCL (ref 0–0.9)
EOSINOPHIL NFR BLD AUTO: 1.4 %
ERYTHROCYTE [DISTWIDTH] IN BLOOD BY AUTOMATED COUNT: 15.7 % (ref 11.5–17)
GLOBULIN SER-MCNC: 3.1 GM/DL (ref 2.4–3.5)
GLUCOSE SERPL-MCNC: 130 MG/DL (ref 82–115)
HCT VFR BLD AUTO: 39.1 % (ref 37–47)
HGB BLD-MCNC: 12.3 GM/DL (ref 12–16)
LYMPHOCYTES # BLD AUTO: 1.5 X10(3)/MCL (ref 0.6–4.6)
LYMPHOCYTES NFR BLD AUTO: 21.6 %
MCH RBC QN AUTO: 26.5 PG (ref 27–31)
MCHC RBC AUTO-ENTMCNC: 31.5 GM/DL (ref 33–36)
MCV RBC AUTO: 84.3 FL (ref 80–94)
MONOCYTES # BLD AUTO: 0.4 X10(3)/MCL (ref 0.1–1.3)
MONOCYTES NFR BLD AUTO: 6.2 %
NEUTROPHILS # BLD AUTO: 5 X10(3)/MCL (ref 2.1–9.2)
NEUTROPHILS NFR BLD AUTO: 70.6 %
PLATELET # BLD AUTO: 165 X10(3)/MCL (ref 130–400)
PMV BLD AUTO: 12 FL (ref 9.4–12.4)
POTASSIUM SERPL-SCNC: 4.7 MMOL/L (ref 3.5–5.1)
PROT SERPL-MCNC: 6.7 GM/DL (ref 5.8–7.6)
RBC # BLD AUTO: 4.64 X10(6)/MCL (ref 4.2–5.4)
SODIUM SERPL-SCNC: 142 MMOL/L (ref 136–145)
WBC # SPEC AUTO: 7.1 X10(3)/MCL (ref 4.5–11.5)

## 2021-12-23 ENCOUNTER — HISTORICAL (OUTPATIENT)
Dept: INFUSION THERAPY | Facility: HOSPITAL | Age: 71
End: 2021-12-23

## 2022-01-05 ENCOUNTER — HISTORICAL (OUTPATIENT)
Dept: RADIATION THERAPY | Facility: HOSPITAL | Age: 72
End: 2022-01-05

## 2022-01-12 ENCOUNTER — HISTORICAL (OUTPATIENT)
Dept: ADMINISTRATIVE | Facility: HOSPITAL | Age: 72
End: 2022-01-12

## 2022-01-12 LAB
ABS NEUT (OLG): 5.71 X10(3)/MCL (ref 2.1–9.2)
ALBUMIN SERPL-MCNC: 3.3 GM/DL (ref 3.4–4.8)
ALBUMIN/GLOB SERPL: 0.9 RATIO (ref 1.1–2)
ALP SERPL-CCNC: 95 UNIT/L (ref 40–150)
ALT SERPL-CCNC: 8 UNIT/L (ref 0–55)
AST SERPL-CCNC: 13 UNIT/L (ref 5–34)
BASOPHILS # BLD AUTO: 0 X10(3)/MCL (ref 0–0.2)
BASOPHILS NFR BLD AUTO: 0.2 %
BILIRUB SERPL-MCNC: 0.3 MG/DL
BILIRUBIN DIRECT+TOT PNL SERPL-MCNC: 0.1 MG/DL (ref 0–0.5)
BILIRUBIN DIRECT+TOT PNL SERPL-MCNC: 0.2 MG/DL (ref 0–0.8)
BUN SERPL-MCNC: 18.2 MG/DL (ref 9.8–20.1)
CALCIUM SERPL-MCNC: 9.4 MG/DL (ref 8.7–10.5)
CHLORIDE SERPL-SCNC: 109 MMOL/L (ref 98–107)
CO2 SERPL-SCNC: 26 MMOL/L (ref 23–31)
CREAT SERPL-MCNC: 1.13 MG/DL (ref 0.55–1.02)
EOSINOPHIL # BLD AUTO: 0.2 X10(3)/MCL (ref 0–0.9)
EOSINOPHIL NFR BLD AUTO: 2.2 %
ERYTHROCYTE [DISTWIDTH] IN BLOOD BY AUTOMATED COUNT: 15.1 % (ref 11.5–17)
EST CREAT CLEARANCE SER (OHS): 39.72 ML/MIN
GLOBULIN SER-MCNC: 3.6 GM/DL (ref 2.4–3.5)
GLUCOSE SERPL-MCNC: 77 MG/DL (ref 82–115)
HCT VFR BLD AUTO: 38.3 % (ref 37–47)
HGB BLD-MCNC: 11.9 GM/DL (ref 12–16)
LYMPHOCYTES # BLD AUTO: 2.3 X10(3)/MCL (ref 0.6–4.6)
LYMPHOCYTES NFR BLD AUTO: 25.8 %
MCH RBC QN AUTO: 26.4 PG (ref 27–31)
MCHC RBC AUTO-ENTMCNC: 31.1 GM/DL (ref 33–36)
MCV RBC AUTO: 84.9 FL (ref 80–94)
MONOCYTES # BLD AUTO: 0.8 X10(3)/MCL (ref 0.1–1.3)
MONOCYTES NFR BLD AUTO: 8.4 %
NEUTROPHILS # BLD AUTO: 5.7 X10(3)/MCL (ref 2.1–9.2)
NEUTROPHILS NFR BLD AUTO: 63.3 %
PLATELET # BLD AUTO: 244 X10(3)/MCL (ref 130–400)
PMV BLD AUTO: 10.2 FL (ref 9.4–12.4)
POTASSIUM SERPL-SCNC: 4.8 MMOL/L (ref 3.5–5.1)
PROT SERPL-MCNC: 6.9 GM/DL (ref 5.8–7.6)
RBC # BLD AUTO: 4.51 X10(6)/MCL (ref 4.2–5.4)
SODIUM SERPL-SCNC: 143 MMOL/L (ref 136–145)
WBC # SPEC AUTO: 9 X10(3)/MCL (ref 4.5–11.5)

## 2022-01-13 ENCOUNTER — HISTORICAL (OUTPATIENT)
Dept: INFUSION THERAPY | Facility: HOSPITAL | Age: 72
End: 2022-01-13

## 2022-02-02 ENCOUNTER — HISTORICAL (OUTPATIENT)
Dept: ADMINISTRATIVE | Facility: HOSPITAL | Age: 72
End: 2022-02-02

## 2022-02-02 LAB
ABS NEUT (OLG): 4.69 (ref 2.1–9.2)
ALBUMIN SERPL-MCNC: 3.3 G/DL (ref 3.4–4.8)
ALBUMIN/GLOB SERPL: 0.8 {RATIO} (ref 1.1–2)
ALP SERPL-CCNC: 99 U/L (ref 40–150)
ALT SERPL-CCNC: 12 U/L (ref 0–55)
AST SERPL-CCNC: 15 U/L (ref 5–34)
BASOPHILS # BLD AUTO: 0 10*3/UL (ref 0–0.2)
BASOPHILS NFR BLD AUTO: 0.1 %
BILIRUB SERPL-MCNC: 0.3 MG/DL
BILIRUBIN DIRECT+TOT PNL SERPL-MCNC: 0.1 (ref 0–0.8)
BILIRUBIN DIRECT+TOT PNL SERPL-MCNC: 0.2 (ref 0–0.5)
BUN SERPL-MCNC: 22.5 MG/DL (ref 9.8–20.1)
CALCIUM SERPL-MCNC: 9.5 MG/DL (ref 8.7–10.5)
CHLORIDE SERPL-SCNC: 107 MMOL/L (ref 98–107)
CO2 SERPL-SCNC: 26 MMOL/L (ref 23–31)
CREAT SERPL-MCNC: 1.1 MG/DL (ref 0.55–1.02)
EOSINOPHIL # BLD AUTO: 0.2 10*3/UL (ref 0–0.9)
EOSINOPHIL NFR BLD AUTO: 2 %
ERYTHROCYTE [DISTWIDTH] IN BLOOD BY AUTOMATED COUNT: 15.6 % (ref 11.5–17)
GLOBULIN SER-MCNC: 3.9 G/DL (ref 2.4–3.5)
GLUCOSE SERPL-MCNC: 116 MG/DL (ref 82–115)
HCT VFR BLD AUTO: 38.3 % (ref 37–47)
HEMOLYSIS INTERF INDEX SERPL-ACNC: 21
HGB BLD-MCNC: 11.8 G/DL (ref 12–16)
ICTERIC INTERF INDEX SERPL-ACNC: 0
LIPEMIC INTERF INDEX SERPL-ACNC: 5
LYMPHOCYTES # BLD AUTO: 2 10*3/UL (ref 0.6–4.6)
LYMPHOCYTES NFR BLD AUTO: 26.3 %
MANUAL DIFF? (OHS): NO
MCH RBC QN AUTO: 26.2 PG (ref 27–31)
MCHC RBC AUTO-ENTMCNC: 30.8 G/DL (ref 33–36)
MCV RBC AUTO: 85.1 FL (ref 80–94)
MONOCYTES # BLD AUTO: 0.7 10*3/UL (ref 0.1–1.3)
MONOCYTES NFR BLD AUTO: 9.1 %
NEUTROPHILS # BLD AUTO: 4.7 10*3/UL (ref 2.1–9.2)
NEUTROPHILS NFR BLD AUTO: 62.1 %
PLATELET # BLD AUTO: 284 10*3/UL (ref 130–400)
PMV BLD AUTO: 9.8 FL (ref 9.4–12.4)
POTASSIUM SERPL-SCNC: 4.7 MMOL/L (ref 3.5–5.1)
PROT SERPL-MCNC: 7.2 G/DL (ref 5.8–7.6)
RBC # BLD AUTO: 4.5 10*6/UL (ref 4.2–5.4)
SODIUM SERPL-SCNC: 141 MMOL/L (ref 136–145)
WBC # SPEC AUTO: 7.6 10*3/UL (ref 4.5–11.5)

## 2022-02-03 ENCOUNTER — HISTORICAL (OUTPATIENT)
Dept: INFUSION THERAPY | Facility: HOSPITAL | Age: 72
End: 2022-02-03

## 2022-02-23 ENCOUNTER — HISTORICAL (OUTPATIENT)
Dept: ADMINISTRATIVE | Facility: HOSPITAL | Age: 72
End: 2022-02-23

## 2022-02-23 LAB
ABS NEUT (OLG): 3.96 (ref 2.1–9.2)
ALBUMIN SERPL-MCNC: 3.3 G/DL (ref 3.4–4.8)
ALBUMIN/GLOB SERPL: 0.9 {RATIO} (ref 1.1–2)
ALP SERPL-CCNC: 102 U/L (ref 40–150)
ALT SERPL-CCNC: 9 U/L (ref 0–55)
AST SERPL-CCNC: 14 U/L (ref 5–34)
BASOPHILS # BLD AUTO: 0 10*3/UL (ref 0–0.2)
BASOPHILS NFR BLD AUTO: 0.3 %
BILIRUB SERPL-MCNC: 0.3 MG/DL
BILIRUBIN DIRECT+TOT PNL SERPL-MCNC: 0.1 (ref 0–0.8)
BILIRUBIN DIRECT+TOT PNL SERPL-MCNC: 0.2 (ref 0–0.5)
BUN SERPL-MCNC: 20 MG/DL (ref 9.8–20.1)
CALCIUM SERPL-MCNC: 9.6 MG/DL (ref 8.7–10.5)
CHLORIDE SERPL-SCNC: 107 MMOL/L (ref 98–107)
CO2 SERPL-SCNC: 26 MMOL/L (ref 23–31)
CREAT SERPL-MCNC: 1.14 MG/DL (ref 0.55–1.02)
EOSINOPHIL # BLD AUTO: 0.2 10*3/UL (ref 0–0.9)
EOSINOPHIL NFR BLD AUTO: 2.4 %
ERYTHROCYTE [DISTWIDTH] IN BLOOD BY AUTOMATED COUNT: 15.8 % (ref 11.5–17)
GLOBULIN SER-MCNC: 3.5 G/DL (ref 2.4–3.5)
GLUCOSE SERPL-MCNC: 172 MG/DL (ref 82–115)
HCT VFR BLD AUTO: 37.1 % (ref 37–47)
HEMOLYSIS INTERF INDEX SERPL-ACNC: 1
HGB BLD-MCNC: 11.6 G/DL (ref 12–16)
ICTERIC INTERF INDEX SERPL-ACNC: 0
LIPEMIC INTERF INDEX SERPL-ACNC: 4
LYMPHOCYTES # BLD AUTO: 1.5 10*3/UL (ref 0.6–4.6)
LYMPHOCYTES NFR BLD AUTO: 24.4 %
MANUAL DIFF? (OHS): NO
MCH RBC QN AUTO: 26.5 PG (ref 27–31)
MCHC RBC AUTO-ENTMCNC: 31.3 G/DL (ref 33–36)
MCV RBC AUTO: 84.9 FL (ref 80–94)
MONOCYTES # BLD AUTO: 0.5 10*3/UL (ref 0.1–1.3)
MONOCYTES NFR BLD AUTO: 8.6 %
NEUTROPHILS # BLD AUTO: 4 10*3/UL (ref 2.1–9.2)
NEUTROPHILS NFR BLD AUTO: 64.1 %
PLATELET # BLD AUTO: 212 10*3/UL (ref 130–400)
PMV BLD AUTO: 10.5 FL (ref 9.4–12.4)
POTASSIUM SERPL-SCNC: 4 MMOL/L (ref 3.5–5.1)
PROT SERPL-MCNC: 6.8 G/DL (ref 5.8–7.6)
RBC # BLD AUTO: 4.37 10*6/UL (ref 4.2–5.4)
SODIUM SERPL-SCNC: 141 MMOL/L (ref 136–145)
WBC # SPEC AUTO: 6.2 10*3/UL (ref 4.5–11.5)

## 2022-02-24 ENCOUNTER — HISTORICAL (OUTPATIENT)
Dept: INFUSION THERAPY | Facility: HOSPITAL | Age: 72
End: 2022-02-24

## 2022-02-25 ENCOUNTER — HISTORICAL (OUTPATIENT)
Dept: CARDIOLOGY | Facility: HOSPITAL | Age: 72
End: 2022-02-25

## 2022-03-15 DIAGNOSIS — C50.919 MALIGNANT NEOPLASM OF FEMALE BREAST, UNSPECIFIED ESTROGEN RECEPTOR STATUS, UNSPECIFIED LATERALITY, UNSPECIFIED SITE OF BREAST: ICD-10-CM

## 2022-03-16 ENCOUNTER — HISTORICAL (OUTPATIENT)
Dept: ADMINISTRATIVE | Facility: HOSPITAL | Age: 72
End: 2022-03-16

## 2022-03-16 LAB
ABS NEUT (OLG): 4.23 (ref 2.1–9.2)
ALBUMIN SERPL-MCNC: 3.4 G/DL (ref 3.4–4.8)
ALBUMIN/GLOB SERPL: 0.9 {RATIO} (ref 1.1–2)
ALP SERPL-CCNC: 100 U/L (ref 40–150)
ALT SERPL-CCNC: 11 U/L (ref 0–55)
AST SERPL-CCNC: 14 U/L (ref 5–34)
BASOPHILS # BLD AUTO: 0 10*3/UL (ref 0–0.2)
BASOPHILS NFR BLD AUTO: 0.1 %
BILIRUB SERPL-MCNC: 0.3 MG/DL
BILIRUBIN DIRECT+TOT PNL SERPL-MCNC: 0.1 (ref 0–0.5)
BILIRUBIN DIRECT+TOT PNL SERPL-MCNC: 0.2 (ref 0–0.8)
BUN SERPL-MCNC: 20.7 MG/DL (ref 9.8–20.1)
CALCIUM SERPL-MCNC: 9.5 MG/DL (ref 8.7–10.5)
CHLORIDE SERPL-SCNC: 108 MMOL/L (ref 98–107)
CO2 SERPL-SCNC: 28 MMOL/L (ref 23–31)
CREAT SERPL-MCNC: 0.95 MG/DL (ref 0.55–1.02)
EOSINOPHIL # BLD AUTO: 0.2 10*3/UL (ref 0–0.9)
EOSINOPHIL NFR BLD AUTO: 2.2 %
ERYTHROCYTE [DISTWIDTH] IN BLOOD BY AUTOMATED COUNT: 15.7 % (ref 11.5–17)
GLOBULIN SER-MCNC: 3.6 G/DL (ref 2.4–3.5)
GLUCOSE SERPL-MCNC: 107 MG/DL (ref 82–115)
HCT VFR BLD AUTO: 39.1 % (ref 37–47)
HEMOLYSIS INTERF INDEX SERPL-ACNC: 0
HGB BLD-MCNC: 12.1 G/DL (ref 12–16)
ICTERIC INTERF INDEX SERPL-ACNC: 0
LIPEMIC INTERF INDEX SERPL-ACNC: 5
LYMPHOCYTES # BLD AUTO: 1.8 10*3/UL (ref 0.6–4.6)
LYMPHOCYTES NFR BLD AUTO: 26.9 %
MANUAL DIFF? (OHS): NO
MCH RBC QN AUTO: 26.8 PG (ref 27–31)
MCHC RBC AUTO-ENTMCNC: 30.9 G/DL (ref 33–36)
MCV RBC AUTO: 86.5 FL (ref 80–94)
MONOCYTES # BLD AUTO: 0.5 10*3/UL (ref 0.1–1.3)
MONOCYTES NFR BLD AUTO: 7.9 %
NEUTROPHILS # BLD AUTO: 4.2 10*3/UL (ref 2.1–9.2)
NEUTROPHILS NFR BLD AUTO: 62.8 %
PLATELET # BLD AUTO: 222 10*3/UL (ref 130–400)
PMV BLD AUTO: 10.4 FL (ref 9.4–12.4)
POTASSIUM SERPL-SCNC: 5 MMOL/L (ref 3.5–5.1)
PROT SERPL-MCNC: 7 G/DL (ref 5.8–7.6)
RBC # BLD AUTO: 4.52 10*6/UL (ref 4.2–5.4)
SODIUM SERPL-SCNC: 143 MMOL/L (ref 136–145)
WBC # SPEC AUTO: 6.7 10*3/UL (ref 4.5–11.5)

## 2022-03-17 ENCOUNTER — HISTORICAL (OUTPATIENT)
Dept: INFUSION THERAPY | Facility: HOSPITAL | Age: 72
End: 2022-03-17

## 2022-04-06 ENCOUNTER — HISTORICAL (OUTPATIENT)
Dept: ADMINISTRATIVE | Facility: HOSPITAL | Age: 72
End: 2022-04-06

## 2022-04-06 LAB
ABS NEUT (OLG): 3.81 (ref 2.1–9.2)
ALBUMIN SERPL-MCNC: 3.4 G/DL (ref 3.4–4.8)
ALBUMIN/GLOB SERPL: 1 {RATIO} (ref 1.1–2)
ALP SERPL-CCNC: 87 U/L (ref 40–150)
ALT SERPL-CCNC: 11 U/L (ref 0–55)
AST SERPL-CCNC: 14 U/L (ref 5–34)
BASOPHILS # BLD AUTO: 0 10*3/UL (ref 0–0.2)
BASOPHILS NFR BLD AUTO: 0.2 %
BILIRUB SERPL-MCNC: 0.3 MG/DL
BILIRUBIN DIRECT+TOT PNL SERPL-MCNC: 0.1 (ref 0–0.5)
BILIRUBIN DIRECT+TOT PNL SERPL-MCNC: 0.2 (ref 0–0.8)
BUN SERPL-MCNC: 25.6 MG/DL (ref 9.8–20.1)
CALCIUM SERPL-MCNC: 9.3 MG/DL (ref 8.7–10.5)
CHLORIDE SERPL-SCNC: 104 MMOL/L (ref 98–107)
CO2 SERPL-SCNC: 24 MMOL/L (ref 23–31)
CREAT SERPL-MCNC: 1.06 MG/DL (ref 0.55–1.02)
EOSINOPHIL # BLD AUTO: 0.1 10*3/UL (ref 0–0.9)
EOSINOPHIL NFR BLD AUTO: 1.2 %
ERYTHROCYTE [DISTWIDTH] IN BLOOD BY AUTOMATED COUNT: 15.4 % (ref 11.5–17)
GLOBULIN SER-MCNC: 3.5 G/DL (ref 2.4–3.5)
GLUCOSE SERPL-MCNC: 221 MG/DL (ref 82–115)
HCT VFR BLD AUTO: 38.9 % (ref 37–47)
HEMOLYSIS INTERF INDEX SERPL-ACNC: 7
HGB BLD-MCNC: 12.2 G/DL (ref 12–16)
ICTERIC INTERF INDEX SERPL-ACNC: 0
LIPEMIC INTERF INDEX SERPL-ACNC: 1
LYMPHOCYTES # BLD AUTO: 1.6 10*3/UL (ref 0.6–4.6)
LYMPHOCYTES NFR BLD AUTO: 27.5 %
MANUAL DIFF? (OHS): NO
MCH RBC QN AUTO: 26.8 PG (ref 27–31)
MCHC RBC AUTO-ENTMCNC: 31.4 G/DL (ref 33–36)
MCV RBC AUTO: 85.5 FL (ref 80–94)
MONOCYTES # BLD AUTO: 0.4 10*3/UL (ref 0.1–1.3)
MONOCYTES NFR BLD AUTO: 6.6 %
NEUTROPHILS # BLD AUTO: 3.8 10*3/UL (ref 2.1–9.2)
NEUTROPHILS NFR BLD AUTO: 64.3 %
PLATELET # BLD AUTO: 231 10*3/UL (ref 130–400)
PMV BLD AUTO: 9.5 FL (ref 9.4–12.4)
POTASSIUM SERPL-SCNC: 4.2 MMOL/L (ref 3.5–5.1)
PROT SERPL-MCNC: 6.9 G/DL (ref 5.8–7.6)
RBC # BLD AUTO: 4.55 10*6/UL (ref 4.2–5.4)
SODIUM SERPL-SCNC: 139 MMOL/L (ref 136–145)
WBC # SPEC AUTO: 5.9 10*3/UL (ref 4.5–11.5)

## 2022-04-07 ENCOUNTER — HISTORICAL (OUTPATIENT)
Dept: INFUSION THERAPY | Facility: HOSPITAL | Age: 72
End: 2022-04-07

## 2022-04-09 ENCOUNTER — HISTORICAL (OUTPATIENT)
Dept: ADMINISTRATIVE | Facility: HOSPITAL | Age: 72
End: 2022-04-09
Payer: MEDICAID

## 2022-04-25 ENCOUNTER — HISTORICAL (OUTPATIENT)
Dept: ADMINISTRATIVE | Facility: HOSPITAL | Age: 72
End: 2022-04-25
Payer: MEDICAID

## 2022-04-25 LAB
ABS NEUT (OLG): 5 (ref 2.1–9.2)
ALBUMIN SERPL-MCNC: 3.2 G/DL (ref 3.4–4.8)
ALBUMIN/GLOB SERPL: 0.9 {RATIO} (ref 1.1–2)
ALP SERPL-CCNC: 73 U/L (ref 40–150)
ALT SERPL-CCNC: 12 U/L (ref 0–55)
AST SERPL-CCNC: 15 U/L (ref 5–34)
BASOPHILS # BLD AUTO: 0 10*3/UL (ref 0–0.2)
BASOPHILS NFR BLD AUTO: 0.1 %
BILIRUB SERPL-MCNC: 0.3 MG/DL
BILIRUBIN DIRECT+TOT PNL SERPL-MCNC: 0.1 (ref 0–0.5)
BILIRUBIN DIRECT+TOT PNL SERPL-MCNC: 0.2 (ref 0–0.8)
BUN SERPL-MCNC: 28.3 MG/DL (ref 9.8–20.1)
CALCIUM SERPL-MCNC: 9.2 MG/DL (ref 8.7–10.5)
CHLORIDE SERPL-SCNC: 109 MMOL/L (ref 98–107)
CO2 SERPL-SCNC: 26 MMOL/L (ref 23–31)
CREAT SERPL-MCNC: 1.16 MG/DL (ref 0.55–1.02)
EOSINOPHIL # BLD AUTO: 0.1 10*3/UL (ref 0–0.9)
EOSINOPHIL NFR BLD AUTO: 1.9 %
ERYTHROCYTE [DISTWIDTH] IN BLOOD BY AUTOMATED COUNT: 15.1 % (ref 11.5–17)
GLOBULIN SER-MCNC: 3.4 G/DL (ref 2.4–3.5)
GLUCOSE SERPL-MCNC: 117 MG/DL (ref 82–115)
HCT VFR BLD AUTO: 37.3 % (ref 37–47)
HEMOLYSIS INTERF INDEX SERPL-ACNC: 7
HGB BLD-MCNC: 11.8 G/DL (ref 12–16)
ICTERIC INTERF INDEX SERPL-ACNC: 0
LIPEMIC INTERF INDEX SERPL-ACNC: 6
LYMPHOCYTES # BLD AUTO: 1.7 10*3/UL (ref 0.6–4.6)
LYMPHOCYTES NFR BLD AUTO: 23.4 %
MANUAL DIFF? (OHS): NO
MCH RBC QN AUTO: 27.1 PG (ref 27–31)
MCHC RBC AUTO-ENTMCNC: 31.6 G/DL (ref 33–36)
MCV RBC AUTO: 85.7 FL (ref 80–94)
MONOCYTES # BLD AUTO: 0.5 10*3/UL (ref 0.1–1.3)
MONOCYTES NFR BLD AUTO: 6.3 %
NEUTROPHILS # BLD AUTO: 5 10*3/UL (ref 2.1–9.2)
NEUTROPHILS NFR BLD AUTO: 68.2 %
PLATELET # BLD AUTO: 203 10*3/UL (ref 130–400)
PMV BLD AUTO: 10.4 FL (ref 9.4–12.4)
POTASSIUM SERPL-SCNC: 4.3 MMOL/L (ref 3.5–5.1)
PROT SERPL-MCNC: 6.6 G/DL (ref 5.8–7.6)
RBC # BLD AUTO: 4.35 10*6/UL (ref 4.2–5.4)
SODIUM SERPL-SCNC: 143 MMOL/L (ref 136–145)
WBC # SPEC AUTO: 7.3 10*3/UL (ref 4.5–11.5)

## 2022-04-27 VITALS
DIASTOLIC BLOOD PRESSURE: 79 MMHG | SYSTOLIC BLOOD PRESSURE: 123 MMHG | WEIGHT: 216.25 LBS | HEIGHT: 62 IN | BODY MASS INDEX: 39.79 KG/M2 | OXYGEN SATURATION: 93 %

## 2022-04-28 ENCOUNTER — HISTORICAL (OUTPATIENT)
Dept: INFUSION THERAPY | Facility: HOSPITAL | Age: 72
End: 2022-04-28
Payer: MEDICAID

## 2022-04-29 NOTE — PROGRESS NOTES
Patient:   Elina Paris            MRN: [id number]            FIN: [id number]               Age:   71 years     Sex:  Female     :  1950   Associated Diagnoses:   None   Author:   Ga MOHAN, Yesy FISHER        Referring Physician:  Marifer Delgado MD         Visit Information     Problem List:   1.  pT1c, N0, M0 stage IA triple positive invasive ductal carcinoma of the right breast.    Treatment Plan:  1.  Weekly taxol and herceptin for 12 weeks followed by a year of maintenance herceptin.  Week 1 given on 2021, week 12 given on 2021.  Maintenance Herceptin started on 2021  2.  Adjuvant endocrine therapy   3.  Adjuvant radiation therapy to start on 2021, completed 2021.    Diagnosis/Treatment/HPI:   71-year-old female who underwent a screening mammogram on 2020 that showed multiple morphologically oval/circumscribed masses throughout both breasts.  This was read as BI-RADS 0, additional imaging recommended.  Diagnostic mammogram and ultrasound of the left breast on 2021 was performed and showed a 1.6 cm irregular mass at the 3 o'clock position, a 1.0 cm oval mass with obscured margins at the 3 o'clock position and a 0.6 cm irregular mass at the 2 o'clock position.  On ultrasound, the 3:00 lesion 8 cm from the nipple measured 1.6 x 1.2 x 1.2 cm.  This was irregular with angular/microlobulated margins.  There was also posterior shadowing.  The 3 o'clock position 3 cm from the nipple showed a 1.0 x 1.0 x 0.7 cm oval circumscribed mass with internal septation.  The 2 o'clock position lesion 5 cm from the nipple measured 0.6 x 0.5 x 0.7 cm, was avascular with peripheral hyper echogenicity and central hypoechogenicity and indistinct margins.  BI-RADS 4, biopsy recommended.  Patient underwent biopsy on 2021, the 3 o'clock position lesion 8 cm from the nipple revealed invasive ductal carcinoma.  The left breast 3 o'clock position 3 cm from the nipple returned as  "invasive ductal carcinoma.  The left breast lesion at the 2 o'clock position showed benign breast tissue.  Estrogen receptor was 99.74% positive, progesterone preceptor was 98.57% positive, HER-2 was 2+ by IHC and positive by FISH.  Patient then underwent bilateral breast MRI on 2/25/2021 which revealed biopsy-proven malignancy at the 3 o'clock position in the left breast spanning 5.7 cm without suspicious enhancement in either breast.  Patient then underwent a left sided lumpectomy on 3/8/2021, final pathology revealed a pT1c, N0, M0 stage IA triple positive invasive ductal carcinoma.  The patient subsequently had a bilateral breast reduction.  This was done by Dr. Manuel Mclean.  She was referred to medical oncology.  I saw the patient on 3/31/2021.  At that visit, she stated that she had bilateral breast infection.  She had no other major issues to discuss.  Baseline DEXA scan completed in 4/8/2021 showed mild osteopenia in the left and right femoral necks.  Echocardiogram completed on 4/14/2021 showed EF of 55-60%.  Taxol and herceptin started on 5/13/2021, week 12 given on 8/19/2021.  Repeat ECHO on 8/31/2021 showed EF of 55-60%.  Radiation started on 9/2/2021.  Started maintenance Herceptin on 9/9/2021.  Completed radiation on 9/30/2021.  Repeat ECHO done on 11/29/2021 showed EF of 60 to 65%.         Chief Complaint    Follow Up      Interval History   Patient presents to clinic for scheduled follow up appointment. She is continuing to c/o left breast pain- residual from radiotherapy. She was seen by Rad/Onc last week for follow up and examined and stated that "everything looked fine." She denies fever or chills or nipple drainage. She has been trying to manage discomfort with OTC ibuprofen but states that this is not helping. Aside from this she is doing well overall. Denies HA, SOB, CP, N/V/C/D. Denies signs of infection. Appetite and energy are stable. VSS. CBC today reviewed and stable " overall.        PMHx:  1.  Coronary artery disease  2.  COPD  3.  Hyperlipidemia  4.  GERD  5.  Hypertension  6.  Hypothyroidism  7.  Anxiety     PSHx:   1.  Left lumpectomy  2.  Cholecystectomy  3.  Total abdominal hysterectomy and unilateral salpingo-oophorectomy  4.  Knee surgery  5.  Sinus surgery  6.  Tonsillectomy  8.  Cervical spinal fusion    Social Hx:   Denies tobacco, alcohol, or illicit drug use.    Family Hx:   She had a brother that had an aneurysm and a stroke, her mother and brother both had cancer, her mother father and brother had hypertension, her mother and father had heart disease, her mother had thyroid disease.         Review of Systems   12 point review of systems done in full with pertinent positives as described above  Remainder of review systems done in full and unremarkable.   Constitutional:  No fever, No chills, No sweats, No weakness.    Eye:  No double vision, No visual disturbances.    Ear/Nose/Mouth/Throat:  No nasal congestion, No sore throat.    Respiratory:  No shortness of breath, No cough.    Cardiovascular:  No chest pain.    Breast:  Both breasts, Pain.    Gastrointestinal:  No nausea, No vomiting, No diarrhea.    Genitourinary:  No dysuria, No hematuria.    Hematology/Lymphatics:  No bruising tendency, No bleeding tendency.    Musculoskeletal:  No back pain.    Integumentary:  No rash, No pruritus.    Neurologic:  No numbness, No tingling, No headache.    Psychiatric:  No anxiety.       Health Status   Allergies:    Allergic Reactions (Selected)  No Known Allergies,    Allergies (1) Active Reaction  No Known Allergies None Documented     Current medications:  (Selected)   Outpatient Medications  Ordered  Heparin Flush 100 U/mL - 5 mL: 500 units, form: Injection, IV Push, Once-chemo, first dose 12/23/21 13:03:00 CST, stop date 12/23/21 13:03:00 CST, Routine, Weeks 4  acetaminophen: 650 mg, form: Tab, Oral, Once-chemo, first dose 09/30/21 13:00:00 CDT, stop date 09/30/21  13:00:00 CDT, Routine, Weeks 4  Future  Benadryl (for IVPB): 25 mg, form: Injection, IV Piggyback, Once-chemo, Infuse over: 20 minute(s), first dose 01/13/22 13:00:00 CST, stop date 01/13/22 13:00:00 CST, Future Order, Weeks 7  Benadryl (for IVPB): 25 mg, form: Injection, IV Piggyback, Once-chemo, Infuse over: 20 minute(s), first dose 02/03/22 13:00:00 CST, stop date 02/03/22 13:00:00 CST, Future Order, Weeks 10  Heparin Flush 100 U/mL - 5 mL: 500 units, form: Injection, IV Push, Once-chemo, first dose 01/13/22 13:00:00 CST, stop date 01/13/22 13:00:00 CST, Routine, Weeks 7, Future Order  Heparin Flush 100 U/mL - 5 mL: 500 units, form: Injection, IV Push, Once-chemo, first dose 02/03/22 13:00:00 CST, stop date 02/03/22 13:00:00 CST, Routine, Weeks 10, Future Order  Herceptin: 567 mg, form: Injection, IV Piggyback, Once, Infuse over: 30 minute(s), first dose 01/13/22 13:00:00 CST, stop date 01/13/22 13:00:00 CST, Future Order, Weeks 7  Herceptin: 567 mg, form: Injection, IV Piggyback, Once, Infuse over: 30 minute(s), first dose 02/03/22 13:00:00 CST, stop date 02/03/22 13:00:00 CST, Future Order, Weeks 10  acetaminophen: 650 mg, form: Tab, Oral, Once-chemo, first dose 01/13/22 13:00:00 CST, stop date 01/13/22 13:00:00 CST, Routine, Weeks 7, Future Order  acetaminophen: 650 mg, form: Tab, Oral, Once-chemo, first dose 02/03/22 13:00:00 CST, stop date 02/03/22 13:00:00 CST, Routine, Weeks 10, Future Order  Pending Complete  midazolam: 2 mg, form: Injection, IV Push, q5min, Order duration: 2 dose(s), first dose 03/08/21 10:30:00 CST, stop date 03/08/21 10:39:00 CST, (up to 5 mg for moderate anxiety)  Prescriptions  Prescribed  Arimidex 1 mg oral tablet: 1 mg = 1 tab(s), Oral, Daily, # 30 tab(s), 3 Refill(s), Pharmacy: [facility]/pharmacy #[id number], 163, cm, Height/Length Dosing, 12/01/21 12:03:00 CST, 92.3, kg, Weight Dosing, 12/01/21 12:03:00 CST  Carafate 1 g/10 mL oral suspension: 1 gm = 10 mL, Oral, BID, on an empty stomach, #  280 mL, 1 Refill(s), [id number], 163, cm, Height/Length Dosing, 06/10/21 9:38:00 CDT, 94.1, kg, Weight Dosing, 06/10/21 9:38:00 CDT  Lomotil 2.5 mg-0.025 mg oral tablet: 1 tab(s), Oral, QID, PRN PRN for loose stool, # 24 tab(s), 1 Refill(s), [id number], 163, cm, Height/Length Dosing, 21 10:26:00 CDT, 94.1, kg, Weight Dosing, 21 10:26:00 CDT  Phenergan 25 mg Tab: See Instructions, 1 tab(s) Oral q6hr prn N+V, # 30 tab(s), 0 Refill(s), [id number], 155, cm, Height/Length Dosing, 21 10:44:00 CDT, 97.8, kg, Weight Dosing, 21 10:44:00 CDT  codeine-guaifenesin 10 mg-100 mg/5 mL oral syrup: 5 mL, Oral, q6hr, PRN PRN for cough and congestion, # 120 mL, 0 Refill(s), [id number], Patient Education Provided, Patient Verbalizes Understanding, 163, cm, Height/Length Dosing, 21 10:09:00 CDT, 91.4, kg, Weight Dosing, ...  olanzapine 5 mg oral tablet, disintegratin mg = 1 tab(s), Oral, Once a day (at bedtime), for nausea, # 30 tab(s), 1 Refill(s), [id number], 163, cm, Height/Length Dosing, 21 10:26:00 CDT, 94.1, kg, Weight Dosing, 21 10:26:00 CDT  prochlorperazine 5 mg oral tablet: 5 mg = 1 tab(s), Oral, TID, PRN PRN for nausea/vomiting, # 30 tab(s), 0 Refill(s), Pharmacy: [facility]/pharmacy #[id number], 163, cm, Height/Length Dosing, 21 10:26:00 CDT, 94.1, kg, Weight Dosing, 21 10:26:00 CDT  traMADol 50 mg oral tablet: 50 mg = 1 tab(s), Oral, q12hr, PRN PRN as needed for pain, # 60 tab(s), 0 Refill(s), Pharmacy: [facility]/pharmacy #[id number], 163, cm, Height/Length Dosing, 22 13:52:00 CST, 94.4, kg, Weight Dosing, 22 13:52:00 CST  Documented Medications  Documented  ASPIRIN EC 81 MG TABLET: 81 mg = 1 tab(s), Oral, Daily  Advil: Oral, q6hr, 0 Refill(s)  Benadryl 25 mg oral capsule: 25 mg = 1 cap(s), Oral, Once a day (at bedtime), PRN PRN for insomnia, 0 Refill(s)  Caltrate 600 + D oral tablet: 1  tab(s), Oral, 0 Refill(s)  Centrum Adults oral tablet: Oral, Daily, 0 Refill(s)  Diltiazem Hydrochloride  mg/24 hours oral capsule, extended release: 0 Refill(s)  Flonase 50 mcg/inh nasal spray: Daily, PRN PRN nasal congestion, 0 Refill(s)  Linzess 290 mcg oral capsule: 290 mcg = 1 cap(s), Oral, Daily, PRN PRN constipation, # 30 cap(s), 0 Refill(s)  Sea cantu gel: Sea cantu gel, 30 mL, Oral, Daily, 0 Refill(s)  Trelegy Ellipta 100 mcg-62.5 mcg-25 mcg/inh inhalation powder: = 1 puff(s), INH, Daily  Xanax 0.25 mg oral tablet: 0.25 mg = 1 tab(s), Oral, Daily  acetaminophen-hydrocodone 325 mg-5 mg oral tablet: 1 tab(s), Oral, q6hr  acetaminophen-hydrocodone 325 mg-7.5 mg oral tablet: 1 tab(s), Oral, q6hr  atorvastatin 40 mg oral tablet: 40 mg = 1 tab(s), Oral, Daily, # 30 tab(s), 0 Refill(s)  clindamycin 300 mg oral capsule: 300 mg = 1 cap(s), Oral, q6hr  cyclobenzaprine 5 mg oral tablet: 5 mg = 1 tab(s), Oral, TID  docusate sodium 100 mg oral capsule: 100 mg = 1 cap(s), Oral, TID  esomeprazole 40 mg oral DR capsule: 40 mg = 1 cap(s), Oral, Daily  estradiol 2 mg oral tablet: 2 mg = 1 tab(s), Oral, Daily  hydrochlorothiazide-triamterene 25 mg-37.5 mg oral tablet: 1 tab(s), Oral, Daily, # 30 tab(s), 0 Refill(s)  levothyroxine 50 mcg (0.05 mg) oral tablet: 50 mcg = 1 tab(s), Oral, Daily, # 30 tab(s), 0 Refill(s)  nitroglycerin 0.4 mg sublingual TAB:   ondansetron 4 mg oral tablet, disintegratin mg = 1 tab(s), Oral, TID  ondansetron 8 mg oral tablet: 8 mg = 1 tab(s), Oral, TID   Problem list:    All Problems  Anxiety / SNOMED CT 69247770 / Confirmed  Arthropathy / SNOMED CT 2318653671 / Confirmed  Atherosclerotic heart disease of native coronary artery without angina pectoris / SNOMED CT 315400478401288 / Confirmed  Breast cancer / SNOMED CT 968948684 / Confirmed  left  CAD - Coronary artery disease / SNOMED CT 6648211796 / Confirmed  COPD - Chronic obstructive pulmonary disease / SNOMED CT 887198670 /  Confirmed  Dyslipidemia / SNOMED CT 5362148843 / Confirmed  GERD - Gastro-esophageal reflux disease / SNOMED CT 4839852968 / Confirmed  History of deep vein thrombosis / SNOMED CT 8456375250 / Confirmed  after TKR- post op  Hypertension / SNOMED CT 99625486 / Confirmed  Hypertension / SNOMED CT 83778438 / Confirmed  Hypothyroidism / SNOMED CT 69692168 / Confirmed  Hypothyroidism / SNOMED CT 08417062 / Confirmed  Encounter for echocardiogram before initiation of chemotherapy / SNOMED CT 168975258 / Confirmed,    Active Problems (14)  Anxiety   Arthropathy   Atherosclerotic heart disease of native coronary artery without angina pectoris   Breast cancer   CAD - Coronary artery disease   COPD - Chronic obstructive pulmonary disease   Dyslipidemia   Encounter for echocardiogram before initiation of chemotherapy   GERD - Gastro-esophageal reflux disease   History of deep vein thrombosis   Hypertension   Hypertension   Hypothyroidism   Hypothyroidism         Physical Examination   Vital Signs   1/12/2022 13:53 CST      Systolic Blood Pressure   146 mmHg  HI                             Diastolic Blood Pressure  81 mmHg                             Mean Arterial Pressure, Cuff              103 mmHg                             Blood Pressure Location   Right arm                             Manual Cuff BP            No    1/12/2022 13:51 CST      Temperature Oral          36.9 DegC                             Temperature Oral (calculated)             98.42 DegF                             Peripheral Pulse Rate     86 bpm                             Respiratory Rate          18 br/min                             SpO2                      97 %                             Oxygen Therapy            Room air                             Systolic Blood Pressure   151 mmHg  HI                             Diastolic Blood Pressure  85 mmHg                             Blood Pressure Location   Right arm                             Manual  Cuff BP            No                             O2 SAT at rest            97 %     Measurements from flowsheet : Measurements   1/12/2022 13:51 CST      Weight Dosing             94.400 kg                             Weight Measured           94.4 kg                             Weight Measured and Calculated in Lbs     208.11 lb                             Height/Length Dosing      163.00 cm                             Height/Length Measured    163 cm                             BSA Measured              2.07 m2                             Body Mass Index Measured  35.53 kg/m2             General:  Alert and oriented, No acute distress.    Eye:  Pupils are equal, round and reactive to light, Extraocular movements are intact, Normal conjunctiva.    HENT:  Normocephalic, Oral mucosa is moist.    Neck:  Supple, Non-tender, No lymphadenopathy.    Respiratory:  Lungs are clear to auscultation, Respirations are non-labored, Breath sounds are equal.    Cardiovascular:  Normal rate, Regular rhythm, No edema.    Gastrointestinal:  Soft, Non-tender, Non-distended, Normal bowel sounds.    Integumentary:  Warm, Dry, Pink, Intact.    Neurologic:  Alert, Oriented, Normal sensory, No focal deficits.    Psychiatric:  Cooperative, Appropriate mood & affect.    Breast:  left breast with redness and tenderness .    Lymphatics:  No lymphadenopathy neck, axilla, groin.    Musculoskeletal:  Normal gait.    Cognition and Speech:  Oriented, Speech clear and coherent.    ECOG Performance Scale: 0 - Fully active; no performance restrictions.      Review / Management   Results review:  Last 10 Days Lab Results : Lab View   1/12/2022 13:43 CST      WBC                       9.0 x10(3)/mcL                             RBC                       4.51 x10(6)/mcL                             Hgb                       11.9 gm/dL  LOW                             Hct                       38.3 %                             Platelet                   244 x10(3)/mcL                             MCV                       84.9 fL                             MCH                       26.4 pg  LOW                             MCHC                      31.1 gm/dL  LOW                             RDW                       15.1 %                             MPV                       10.2 fL                             Abs Neut                  5.71 x10(3)/mcL                             NEUT%                     63.3 %  NA                             NEUT#                     5.7 x10(3)/mcL                             LYMPH%                    25.8 %  NA                             LYMPH#                    2.3 x10(3)/mcL                             MONO%                     8.4 %  NA                             MONO#                     0.8 x10(3)/mcL                             EOS%                      2.2 %  NA                             EOS#                      0.2 x10(3)/mcL                             BASO%                     0.2 %  NA                             BASO#                     0.0 x10(3)/mcL  .       Impression and Plan   Diagnoses:  1.  pT1c, N0, M0 stage IA triple positive invasive ductal carcinoma of the right breast.    Plan:  Patient has a stage IA triple positive breast cancer.  Due to HER-2 being positive, we treated with Taxol and Herceptin weekly for 12 weeks, completed on 8/19/2021.  She will then complete a years worth of Herceptin, started on 9/9/2021.  Radiation started on 9/2/2021, finished on 9/30/2021.      Echocardiogram completed on 4/14/2021 showed EF of 55-60%. Repeat ECHO on 8/31/2021 with EF 55-60%.  Repeat ECHO done on 11/29/2021    Baseline DEXA scan completed in 4/8/2021 showed mild osteopenia of the left and right femoral neck.    She was seen by Dr. Renee on 8/23/2021; completed radiation on 9/30/2021-- seen for follow up last week, noted to be unremarkable .    Started maintenance Herceptin on 9/9/2021.  She will  complete 1 year worth- OK to proceed with next cycle tomorrow as planned.      Continue endocrine therapy as prescribed -- tolerating well overall     She is continuing to struggle with tenderness/redness to left breast following XRT- continue recommendations per Rad/Onc. Will also send in stronger pain medication to help manage discomfort as she continues to heal    Return to clinic in 3 weeks  for TD visit and clinical examination-- will likely order repeat ECHO at that time    Labs: CBC, CMP      All questions were answered to the best of my ability.   Yesy URIOSTEGUI, FNP-C

## 2022-04-29 NOTE — PROGRESS NOTES
Patient:   Elina Paris            MRN: [id number]            FIN: [id number]               Age:   71 years     Sex:  Female     :  1950   Associated Diagnoses:   None   Author:   Yesy Koroma NP        Referring Physician:  Marifer Delgado MD         Visit Information     Problem List:   1.  pT1c, N0, M0 stage IA triple positive invasive ductal carcinoma of the right breast.    Treatment Plan:  1.  Weekly taxol and herceptin for 12 weeks followed by a year of maintenance herceptin.  Week 1 given on 2021.    2.  Adjuvant endocrine therapy   3.  Adjuvant radiation therapy.    Diagnosis/Treatment/HPI:   71-year-old female who underwent a screening mammogram on 2020 that showed multiple morphologically oval/circumscribed masses throughout both breasts.  This was read as BI-RADS 0, additional imaging recommended.  Diagnostic mammogram and ultrasound of the left breast on 2021 was performed and showed a 1.6 cm irregular mass at the 3 o'clock position, a 1.0 cm oval mass with obscured margins at the 3 o'clock position and a 0.6 cm irregular mass at the 2 o'clock position.  On ultrasound, the 3:00 lesion 8 cm from the nipple measured 1.6 x 1.2 x 1.2 cm.  This was irregular with angular/microlobulated margins.  There was also posterior shadowing.  The 3 o'clock position 3 cm from the nipple showed a 1.0 x 1.0 x 0.7 cm oval circumscribed mass with internal septation.  The 2 o'clock position lesion 5 cm from the nipple measured 0.6 x 0.5 x 0.7 cm, was avascular with peripheral hyper echogenicity and central hypoechogenicity and indistinct margins.  BI-RADS 4, biopsy recommended.  Patient underwent biopsy on 2021, the 3 o'clock position lesion 8 cm from the nipple revealed invasive ductal carcinoma.  The left breast 3 o'clock position 3 cm from the nipple returned as invasive ductal carcinoma.  The left breast lesion at the 2 o'clock position showed benign breast tissue.   Estrogen receptor was 99.74% positive, progesterone preceptor was 98.57% positive, HER-2 was 2+ by IHC and positive by FISH.  Patient then underwent bilateral breast MRI on 2/25/2021 which revealed biopsy-proven malignancy at the 3 o'clock position in the left breast spanning 5.7 cm without suspicious enhancement in either breast.  Patient then underwent a left sided lumpectomy on 3/8/2021, final pathology revealed a pT1c, N0, M0 stage IA triple positive invasive ductal carcinoma.  The patient subsequently had a bilateral breast reduction.  This was done by Dr. Manuel Mclean.  She was referred to medical oncology.  I saw the patient on 3/31/2021.  At that visit, she stated that she had bilateral breast infection.  She had no other major issues to discuss.  Baseline DEXA scan completed in 4/8/2021 showed mild osteopenia in the left and right femoral necks.  Echocardiogram completed on 4/14/2021 showed EF of 55-60%.  Taxol and herceptin started on 5/13/2021.           Chief Complaint    Follow Up      Interval History   Patient presents to clinic for scheduled follow up appointment. She reports some improvement in reflux/GERD symptoms following modifications at last appt but states that these worsen once again a few days after treatment. She report some issues with intermittent diarrhea as well. She reports decreased appetite but is attempting to maintain adequate PO intake despite this. Weight is stable overall. Denies melena or BRBPR. Denies HA, SOB, CP, N/V/C/D. Denies new neurological symptoms. No other questions noted today. CBC reviewed and stable for treatment. CMP pending.       PMHx:  1.  Coronary artery disease  2.  COPD  3.  Hyperlipidemia  4.  GERD  5.  Hypertension  6.  Hypothyroidism  7.  Anxiety     PSHx:   1.  Left lumpectomy  2.  Cholecystectomy  3.  Total abdominal hysterectomy and unilateral salpingo-oophorectomy  4.  Knee surgery  5.  Sinus surgery  6.  Tonsillectomy  8.  Cervical spinal  fusion    Social Hx:   Denies tobacco, alcohol, or illicit drug use.    Family Hx:   She had a brother that had an aneurysm and a stroke, her mother and brother both had cancer, her mother father and brother had hypertension, her mother and father had heart disease, her mother had thyroid disease.         Review of Systems   12 point review of systems done in full with pertinent positives as described above  Remainder of review systems done in full and unremarkable.   Constitutional:  No fever, No chills, No sweats, No weakness.    Eye:  No double vision, No visual disturbances.    Ear/Nose/Mouth/Throat:  No nasal congestion, No sore throat.    Respiratory:  No shortness of breath, No cough.    Cardiovascular:  No chest pain.    Breast:  Both breasts, Pain.    Gastrointestinal:  No nausea, No vomiting, No diarrhea.    Genitourinary:  No dysuria, No hematuria.    Hematology/Lymphatics:  No bruising tendency, No bleeding tendency.    Musculoskeletal:  No back pain.    Integumentary:  No rash, No pruritus.    Neurologic:  No numbness, No tingling, No headache.    Psychiatric:  No anxiety.       Health Status   Allergies:    Allergic Reactions (Selected)  No Known Allergies,    Allergies (1) Active Reaction  No Known Allergies None Documented     Current medications:  (Selected)   Outpatient Medications  Future  Aloxi (for IVPB): 0.25 mg, form: Injection, IV Piggyback, Once-chemo, Infuse over: 20 minute(s), first dose 07/08/21 10:40:00 CDT, stop date 07/08/21 10:40:00 CDT, Future Order, Weeks 3  Benadryl 50mg/ml Inj: 25 mg, form: Injection, IV Piggyback, Once-chemo, Infuse over: 20 minute(s), first dose 07/08/21 10:40:00 CDT, stop date 07/08/21 10:40:00 CDT, Routine, Weeks 3, Future Order  Emend (for IVPB): 150 mg, form: Powder-Inj, IV Piggyback, Once-chemo, Infuse over: 30 minute(s), first dose 07/08/21 10:00:00 CDT, stop date 07/08/21 10:00:00 CDT, Future Order, Weeks 3  Heparin Flush 100 U/mL - 5 mL: 500 units, form:  Injection, IV Push, Once-chemo, first dose 07/08/21 12:30:00 CDT, stop date 07/08/21 12:30:00 CDT, Routine, Weeks 3, Future Order  Herceptin (for IVPB): 189 mg, form: Injection, IV Piggyback, Once-chemo, Infuse over: 30 minute(s), first dose 07/08/21 10:00:00 CDT, stop date 07/08/21 10:00:00 CDT, Future Order, Weeks 3  Pepcid (for IVPB): 40 mg, form: Injection, IV Piggyback, Once-chemo, Infuse over: 20 minute(s), first dose 07/08/21 10:40:00 CDT, stop date 07/08/21 10:40:00 CDT, Future Order, Weeks 3  Taxol (for IVPB): 153 mg, form: Injection, IV Piggyback, Once-chemo, Infuse over: 1 hr, first dose 07/08/21 11:00:00 CDT, stop date 07/08/21 11:00:00 CDT, Future Order, Weeks 3  acetaminophen: 650 mg, form: Tab, Oral, Once-chemo, first dose 07/08/21 10:40:00 CDT, stop date 07/08/21 10:40:00 CDT, Routine, Weeks 3, Future Order  dexamethasone (for IVPB): 10 mg, form: Soln, IV Piggyback, Once-chemo, Infuse over: 20 minute(s), first dose 07/08/21 10:40:00 CDT, stop date 07/08/21 10:40:00 CDT, Future Order, Weeks 3  Pending Complete  midazolam: 2 mg, form: Injection, IV Push, q5min, Order duration: 2 dose(s), first dose 03/08/21 10:30:00 CST, stop date 03/08/21 10:39:00 CST, (up to 5 mg for moderate anxiety)  Prescriptions  Prescribed  Carafate 1 g/10 mL oral suspension: 1 gm = 10 mL, Oral, BID, on an empty stomach, # 280 mL, 1 Refill(s), Pharmacy: [facility]/pharmacy #[id number], 163, cm, Height/Length Dosing, 06/10/21 9:38:00 CDT, 94.1, kg, Weight Dosing, 06/10/21 9:38:00 CDT  Lomotil 2.5 mg-0.025 mg oral tablet: 1 tab(s), Oral, QID, PRN PRN for loose stool, # 24 tab(s), 1 Refill(s), Pharmacy: [facility]/pharmacy #[id number], 163, cm, Height/Length Dosing, 05/26/21 10:26:00 CDT, 94.1, kg, Weight Dosing, 05/26/21 10:26:00 CDT  Phenergan 25 mg Tab: See Instructions, 1 tab(s) Oral q6hr prn N+V, # 30 tab(s), 0 Refill(s), Pharmacy: [facility]/pharmacy #[id number], 155, cm, Height/Length Dosing, 05/13/21 10:44:00 CDT, 97.8, kg, Weight Dosing, 05/13/21 10:44:00  CDT  atropine-diphenoxylate 0.025 mg-2.5 mg oral tablet: 1 tab(s), Oral, QID, PRN PRN as needed for loose stool, # 90 tab(s), 0 Refill(s), [id number], 163, cm, Height/Length Dosing, 06/10/21 9:38:00 CDT, 94.1, kg, Weight Dosing, 06/10/21 9:38:00 CDT  olanzapine 5 mg oral tablet, disintegratin mg = 1 tab(s), Oral, Once a day (at bedtime), for nausea, # 30 tab(s), 1 Refill(s), Pharmacy: [facility]/pharmacy #[id number], 163, cm, Height/Length Dosing, 21 10:26:00 CDT, 94.1, kg, Weight Dosing, 21 10:26:00 CDT  prochlorperazine 5 mg oral tablet: 5 mg = 1 tab(s), Oral, TID, PRN PRN for nausea/vomiting, # 30 tab(s), 0 Refill(s), Pharmacy: [facility]/pharmacy #[id number], 163, cm, Height/Length Dosing, 21 10:26:00 CDT, 94.1, kg, Weight Dosing, 21 10:26:00 CDT  Documented Medications  Documented  ASPIRIN EC 81 MG TABLET: 81 mg = 1 tab(s), Oral, Daily  Benadryl 25 mg oral capsule: 25 mg = 1 cap(s), Oral, Once a day (at bedtime), PRN PRN for insomnia, 0 Refill(s)  Caltrate 600 + D oral tablet: 1 tab(s), Oral, 0 Refill(s)  Centrum Adults oral tablet: Oral, Daily, 0 Refill(s)  Diltiazem Hydrochloride  mg/24 hours oral capsule, extended release: 0 Refill(s)  Flonase 50 mcg/inh nasal spray: Daily, PRN PRN nasal congestion, 0 Refill(s)  Linzess 290 mcg oral capsule: 290 mcg = 1 cap(s), Oral, Daily, PRN PRN constipation, # 30 cap(s), 0 Refill(s)  Sea cantu gel: Sea cantu gel, 30 mL, Oral, Daily, 0 Refill(s)  Trelegy Ellipta 100 mcg-62.5 mcg-25 mcg/inh inhalation powder: = 1 puff(s), INH, Daily  Xanax 0.25 mg oral tablet: 0.25 mg = 1 tab(s), Oral, Daily  acetaminophen-hydrocodone 325 mg-7.5 mg oral tablet: 1 tab(s), Oral, q6hr  atorvastatin 40 mg oral tablet: 40 mg = 1 tab(s), Oral, Daily, # 30 tab(s), 0 Refill(s)  clindamycin 300 mg oral capsule: 300 mg = 1 cap(s), Oral, q6hr  docusate sodium 100 mg oral capsule: 100 mg = 1 cap(s), Oral, TID  esomeprazole 40 mg oral DR capsule: 40 mg = 1 cap(s), Oral,  Daily  hydrochlorothiazide-triamterene 25 mg-37.5 mg oral tablet: 1 tab(s), Oral, Daily, # 30 tab(s), 0 Refill(s)  levothyroxine 50 mcg (0.05 mg) oral tablet: 50 mcg = 1 tab(s), Oral, Daily, # 30 tab(s), 0 Refill(s)  nitroglycerin 0.4 mg sublingual TAB:   ondansetron 4 mg oral tablet, disintegratin mg = 1 tab(s), Oral, TID  ondansetron 8 mg oral tablet: 8 mg = 1 tab(s), Oral, TID   Problem list:    All Problems  Anxiety / SNOMED CT 50671088 / Confirmed  Arthropathy / SNOMED CT 9485624669 / Confirmed  Atherosclerotic heart disease of native coronary artery without angina pectoris / SNOMED CT 970105242958264 / Confirmed  Breast cancer / SNOMED CT 829619451 / Confirmed  left  CAD - Coronary artery disease / SNOMED CT 0954833411 / Confirmed  COPD - Chronic obstructive pulmonary disease / SNOMED CT 444311061 / Confirmed  Dyslipidemia / SNOMED CT 3901790697 / Confirmed  GERD - Gastro-esophageal reflux disease / SNOMED CT 4932555779 / Confirmed  History of deep vein thrombosis / SNOMED CT 1092052473 / Confirmed  after TKR- post op  Hypertension / SNOMED CT 66259475 / Confirmed  Hypertension / SNOMED CT 64601055 / Confirmed  Hypothyroidism / SNOMED CT 97207783 / Confirmed  Hypothyroidism / SNOMED CT 20137158 / Confirmed  Encounter for echocardiogram before initiation of chemotherapy / SNOMED CT 384337158 / Confirmed,    Active Problems (14)  Anxiety   Arthropathy   Atherosclerotic heart disease of native coronary artery without angina pectoris   Breast cancer   CAD - Coronary artery disease   COPD - Chronic obstructive pulmonary disease   Dyslipidemia   Encounter for echocardiogram before initiation of chemotherapy   GERD - Gastro-esophageal reflux disease   History of deep vein thrombosis   Hypertension   Hypertension   Hypothyroidism   Hypothyroidism         Physical Examination   Vital Signs   2021 13:54 CDT       Temperature Oral          37.3 DegC                             Temperature Oral (calculated)              99.14 DegF                             Peripheral Pulse Rate     98 bpm                             Respiratory Rate          18 br/min                             SpO2                      97 %                             Oxygen Therapy            Room air                             Systolic Blood Pressure   126 mmHg                             Diastolic Blood Pressure  86 mmHg                             Blood Pressure Location   Left arm                             Manual Cuff BP            No                             O2 SAT at rest            97 %     Measurements from flowsheet : Measurements   7/7/2021 13:54 CDT       Weight Dosing             92.100 kg                             Weight Measured           92.1 kg                             Weight Measured and Calculated in Lbs     203.04 lb                             Height/Length Dosing      163.00 cm                             Height/Length Measured    163 cm                             BSA Measured              2.04 m2                             Body Mass Index Measured  34.66 kg/m2             General:  Alert and oriented, No acute distress.    Eye:  Pupils are equal, round and reactive to light, Extraocular movements are intact, Normal conjunctiva.    HENT:  Normocephalic, Oral mucosa is moist.    Neck:  Supple, Non-tender, No lymphadenopathy.    Respiratory:  Lungs are clear to auscultation, Respirations are non-labored, Breath sounds are equal.    Cardiovascular:  Normal rate, Regular rhythm, No edema.    Gastrointestinal:  Soft, Non-tender, Non-distended, Normal bowel sounds.    Integumentary:  Warm, Dry, Pink, Intact.    Neurologic:  Alert, Oriented, Normal sensory, No focal deficits.    Psychiatric:  Cooperative, Appropriate mood & affect.    Breast:  deferred today.    Lymphatics:  No lymphadenopathy neck, axilla, groin.    Musculoskeletal:  Normal gait.    Cognition and Speech:  Oriented, Speech clear and coherent.    ECOG Performance  Scale: 0 - Fully active; no performance restrictions.      Review / Management   Results review:  Lab results   7/7/2021 13:40 CDT       WBC                       9.0 x10(3)/mcL                             RBC                       4.06 x10(6)/mcL  LOW                             Hgb                       11.3 gm/dL  LOW                             Hct                       35.2 %  LOW                             Platelet                  272 x10(3)/mcL                             MCV                       86.7 fL                             MCH                       27.8 pg                             MCHC                      32.1 gm/dL  LOW                             RDW                       18.1 %  HI                             MPV                       9.6 fL                             Abs Neut                  5.44 x10(3)/mcL                             NEUT%                     60.6 %  NA                             NEUT#                     5.4 x10(3)/mcL                             LYMPH%                    32.9 %  NA                             LYMPH#                    3.0 x10(3)/mcL                             MONO%                     4.8 %  NA                             MONO#                     0.4 x10(3)/mcL                             EOS%                      0.7 %  NA                             EOS#                      0.1 x10(3)/mcL                             BASO%                     0.2 %  NA                             BASO#                     0.0 x10(3)/mcL  .       Impression and Plan   Diagnoses:  1.  pT1c, N0, M0 stage IA triple positive invasive ductal carcinoma of the right breast.    Plan:  Patient has a stage IA triple positive breast cancer.  Due to HER-2 being positive, we will plan to treat her with Taxol and Herceptin weekly for 12 weeks.  She will then complete a years worth of Herceptin.  We are planning on starting on 5/13/2021 due to her bilateral breast infection and  recent breast reduction.    Echocardiogram completed on 4/14/2021 showed EF of 55-60%.     Baseline DEXA scan completed in 4/8/2021 showed mild osteopenia of the left and right femoral neck.    Okay to proceed with week #9 of treatment tomorrow    Refill of Lomotil sent to pharmacy    Continue carafate and PPI    Recommended daily OTC Pepcid in addition to continuation of IV Pepcid     Will continue with IV hydration weekly with treatment     Call clinic with any worsening of symptoms     Return to clinic in 4 weeks for TD visit and clinical examination     Labs: CBC, CMP    All questions were answered to the best of my ability and she understands the plan moving forward    Yesy URIOSTEGUI, MIRLANDEP-C

## 2022-04-29 NOTE — PROGRESS NOTES
Patient:   Elina Paris            MRN: [id number]            FIN: [id number]               Age:   71 years     Sex:  Female     :  1950   Associated Diagnoses:   None   Author:   Osmar CASEY MD, Philippe E        Referring Physician:  Marifer Delgado MD         Visit Information     Problem List:   1.  pT1c, N0, M0 stage IA triple positive invasive ductal carcinoma of the right breast.    Treatment Plan:  1.  Weekly taxol and herceptin for 12 weeks followed by a year of maintenance herceptin.  Week 1 given on 2021, week 12 given on 2021.  Maintenance Herceptin started on 2021  2.  Adjuvant endocrine therapy   3.  Adjuvant radiation therapy to start on 2021, completed 2021.    Diagnosis/Treatment/HPI:   71-year-old female who underwent a screening mammogram on 2020 that showed multiple morphologically oval/circumscribed masses throughout both breasts.  This was read as BI-RADS 0, additional imaging recommended.  Diagnostic mammogram and ultrasound of the left breast on 2021 was performed and showed a 1.6 cm irregular mass at the 3 o'clock position, a 1.0 cm oval mass with obscured margins at the 3 o'clock position and a 0.6 cm irregular mass at the 2 o'clock position.  On ultrasound, the 3:00 lesion 8 cm from the nipple measured 1.6 x 1.2 x 1.2 cm.  This was irregular with angular/microlobulated margins.  There was also posterior shadowing.  The 3 o'clock position 3 cm from the nipple showed a 1.0 x 1.0 x 0.7 cm oval circumscribed mass with internal septation.  The 2 o'clock position lesion 5 cm from the nipple measured 0.6 x 0.5 x 0.7 cm, was avascular with peripheral hyper echogenicity and central hypoechogenicity and indistinct margins.  BI-RADS 4, biopsy recommended.  Patient underwent biopsy on 2021, the 3 o'clock position lesion 8 cm from the nipple revealed invasive ductal carcinoma.  The left breast 3 o'clock position 3 cm from the nipple returned as  invasive ductal carcinoma.  The left breast lesion at the 2 o'clock position showed benign breast tissue.  Estrogen receptor was 99.74% positive, progesterone preceptor was 98.57% positive, HER-2 was 2+ by IHC and positive by FISH.  Patient then underwent bilateral breast MRI on 2/25/2021 which revealed biopsy-proven malignancy at the 3 o'clock position in the left breast spanning 5.7 cm without suspicious enhancement in either breast.  Patient then underwent a left sided lumpectomy on 3/8/2021, final pathology revealed a pT1c, N0, M0 stage IA triple positive invasive ductal carcinoma.  The patient subsequently had a bilateral breast reduction.  This was done by Dr. Manuel Mclean.  She was referred to medical oncology.  I saw the patient on 3/31/2021.  At that visit, she stated that she had bilateral breast infection.  She had no other major issues to discuss.  Baseline DEXA scan completed in 4/8/2021 showed mild osteopenia in the left and right femoral necks.  Echocardiogram completed on 4/14/2021 showed EF of 55-60%.  Taxol and herceptin started on 5/13/2021, week 12 given on 8/19/2021.  Repeat ECHO on 8/31/2021 showed EF of 55-60%.  Radiation started on 9/2/2021.  Started maintenance Herceptin on 9/9/2021.  Completed radiation on 9/30/2021.         Chief Complaint    Follow Up      Interval History   Patient presents to clinic for scheduled follow up appointment. She is doing well overall w/o any notable complaints besides some mild soreness to left breast. Otherwise, she is tolerating treatment w/o issue. Denies HA, Shortness of breath, CP, N/V/C/D. Denies fevers, chills or other signs of infection. Appetite and energy are stable.     PMHx:  1.  Coronary artery disease  2.  COPD  3.  Hyperlipidemia  4.  GERD  5.  Hypertension  6.  Hypothyroidism  7.  Anxiety     PSHx:   1.  Left lumpectomy  2.  Cholecystectomy  3.  Total abdominal hysterectomy and unilateral salpingo-oophorectomy  4.  Knee surgery  5.  Sinus  surgery  6.  Tonsillectomy  8.  Cervical spinal fusion    Social Hx:   Denies tobacco, alcohol, or illicit drug use.    Family Hx:   She had a brother that had an aneurysm and a stroke, her mother and brother both had cancer, her mother father and brother had hypertension, her mother and father had heart disease, her mother had thyroid disease.         Review of Systems   12 point review of systems done in full with pertinent positives as described above  Remainder of review systems done in full and unremarkable.   Constitutional:  No fever, No chills, No sweats, No weakness.    Eye:  No double vision, No visual disturbances.    Ear/Nose/Mouth/Throat:  No nasal congestion, No sore throat.    Respiratory:  No shortness of breath, No cough.    Cardiovascular:  No chest pain.    Breast:  Both breasts, Pain.    Gastrointestinal:  No nausea, No vomiting, No diarrhea.    Genitourinary:  No dysuria, No hematuria.    Hematology/Lymphatics:  No bruising tendency, No bleeding tendency.    Musculoskeletal:  No back pain.    Integumentary:  No rash, No pruritus.    Neurologic:  No numbness, No tingling, No headache.    Psychiatric:  No anxiety.       Health Status   Allergies:    Allergic Reactions (Selected)  No Known Allergies,    Allergies (1) Active Reaction  No Known Allergies None Documented     Current medications:  (Selected)   Outpatient Medications  Ordered  acetaminophen: 650 mg, form: Tab, Oral, Once-chemo, first dose 09/30/21 13:00:00 CDT, stop date 09/30/21 13:00:00 CDT, Routine, Weeks 4  Future  Benadryl (for IVPB): 25 mg, form: Injection, IV Piggyback, Once-chemo, Infuse over: 20 minute(s), first dose 10/21/21 13:00:00 CDT, stop date 10/21/21 13:00:00 CDT, Future Order, Weeks 7  Benadryl (for IVPB): 25 mg, form: Injection, IV Piggyback, Once-chemo, Infuse over: 20 minute(s), first dose 11/11/21 13:00:00 CST, stop date 11/11/21 13:00:00 CST, Future Order, Weeks 10  Heparin Flush 100 U/mL - 5 mL: 500 units, form:  Injection, IV Push, Once-chemo, first dose 10/21/21 13:00:00 CDT, stop date 10/21/21 13:00:00 CDT, Routine, Weeks 7, Future Order  Heparin Flush 100 U/mL - 5 mL: 500 units, form: Injection, IV Push, Once-chemo, first dose 11/11/21 13:00:00 CST, stop date 11/11/21 13:00:00 CST, Routine, Weeks 10, Future Order  Herceptin: 546 mg, form: Injection, IV Piggyback, Once, Infuse over: 30 minute(s), first dose 10/21/21 13:00:00 CDT, stop date 10/21/21 13:00:00 CDT, Future Order, Weeks 7  Herceptin: 546 mg, form: Injection, IV Piggyback, Once, Infuse over: 30 minute(s), first dose 11/11/21 13:00:00 CST, stop date 11/11/21 13:00:00 CST, Future Order, Weeks 10  acetaminophen: 650 mg, form: Tab, Oral, Once-chemo, first dose 10/21/21 13:00:00 CDT, stop date 10/21/21 13:00:00 CDT, Routine, Weeks 7, Future Order  acetaminophen: 650 mg, form: Tab, Oral, Once-chemo, first dose 11/11/21 13:00:00 CST, stop date 11/11/21 13:00:00 CST, Routine, Weeks 10, Future Order  Pending Complete  midazolam: 2 mg, form: Injection, IV Push, q5min, Order duration: 2 dose(s), first dose 03/08/21 10:30:00 CST, stop date 03/08/21 10:39:00 CST, (up to 5 mg for moderate anxiety)  Prescriptions  Prescribed  Carafate 1 g/10 mL oral suspension: 1 gm = 10 mL, Oral, BID, on an empty stomach, # 280 mL, 1 Refill(s), Pharmacy: [facility] #[id number], 163, cm, Height/Length Dosing, 06/10/21 9:38:00 CDT, 94.1, kg, Weight Dosing, 06/10/21 9:38:00 CDT  Lomotil 2.5 mg-0.025 mg oral tablet: 1 tab(s), Oral, QID, PRN PRN for loose stool, # 24 tab(s), 1 Refill(s), Pharmacy: [facility] #[id number], 163, cm, Height/Length Dosing, 05/26/21 10:26:00 CDT, 94.1, kg, Weight Dosing, 05/26/21 10:26:00 CDT  Phenergan 25 mg Tab: See Instructions, 1 tab(s) Oral q6hr prn N+V, # 30 tab(s), 0 Refill(s), Pharmacy: [facility] #[id number], 155, cm, Height/Length Dosing, 05/13/21 10:44:00 CDT, 97.8, kg, Weight Dosing, 05/13/21 10:44:00 CDT  codeine-guaifenesin 10 mg-100 mg/5 mL oral syrup: 5 mL, Oral,  q6hr, PRN PRN for cough and congestion, # 120 mL, 0 Refill(s), [id number], Patient Education Provided, Patient Verbalizes Understanding, 163, cm, Height/Length Dosing, 21 10:09:00 CDT, 91.4, kg, Weight Dosing, ...  olanzapine 5 mg oral tablet, disintegratin mg = 1 tab(s), Oral, Once a day (at bedtime), for nausea, # 30 tab(s), 1 Refill(s), [id number], 163, cm, Height/Length Dosing, 21 10:26:00 CDT, 94.1, kg, Weight Dosing, 21 10:26:00 CDT  prochlorperazine 5 mg oral tablet: 5 mg = 1 tab(s), Oral, TID, PRN PRN for nausea/vomiting, # 30 tab(s), 0 Refill(s), [id number], 163, cm, Height/Length Dosing, 21 10:26:00 CDT, 94.1, kg, Weight Dosing, 21 10:26:00 CDT  Documented Medications  Documented  ASPIRIN EC 81 MG TABLET: 81 mg = 1 tab(s), Oral, Daily  Advil: Oral, q6hr, 0 Refill(s)  Benadryl 25 mg oral capsule: 25 mg = 1 cap(s), Oral, Once a day (at bedtime), PRN PRN for insomnia, 0 Refill(s)  Caltrate 600 + D oral tablet: 1 tab(s), Oral, 0 Refill(s)  Centrum Adults oral tablet: Oral, Daily, 0 Refill(s)  Diltiazem Hydrochloride  mg/24 hours oral capsule, extended release: 0 Refill(s)  Flonase 50 mcg/inh nasal spray: Daily, PRN PRN nasal congestion, 0 Refill(s)  Linzess 290 mcg oral capsule: 290 mcg = 1 cap(s), Oral, Daily, PRN PRN constipation, # 30 cap(s), 0 Refill(s)  Sea cantu gel: Sea cantu gel, 30 mL, Oral, Daily, 0 Refill(s)  Trelegy Ellipta 100 mcg-62.5 mcg-25 mcg/inh inhalation powder: = 1 puff(s), INH, Daily  Xanax 0.25 mg oral tablet: 0.25 mg = 1 tab(s), Oral, Daily  acetaminophen-hydrocodone 325 mg-7.5 mg oral tablet: 1 tab(s), Oral, q6hr  atorvastatin 40 mg oral tablet: 40 mg = 1 tab(s), Oral, Daily, # 30 tab(s), 0 Refill(s)  clindamycin 300 mg oral capsule: 300 mg = 1 cap(s), Oral, q6hr  docusate sodium 100 mg oral capsule: 100 mg = 1 cap(s), Oral, TID  esomeprazole 40 mg oral DR capsule: 40 mg = 1 cap(s), Oral,  Daily  hydrochlorothiazide-triamterene 25 mg-37.5 mg oral tablet: 1 tab(s), Oral, Daily, # 30 tab(s), 0 Refill(s)  levothyroxine 50 mcg (0.05 mg) oral tablet: 50 mcg = 1 tab(s), Oral, Daily, # 30 tab(s), 0 Refill(s)  nitroglycerin 0.4 mg sublingual TAB:   ondansetron 4 mg oral tablet, disintegratin mg = 1 tab(s), Oral, TID  ondansetron 8 mg oral tablet: 8 mg = 1 tab(s), Oral, TID   Problem list:    All Problems  Hypothyroidism / 21232579 / Confirmed  Hypertension / 87864689 / Confirmed  Hypertension / 40940358 / Confirmed  Hypothyroidism / 44174620 / Confirmed  COPD - Chronic obstructive pulmonary disease / 180010831 / Confirmed  Arthropathy / 4316699330 / Confirmed  Atherosclerotic heart disease of native coronary artery without angina pectoris / 812930876257035 / Confirmed  CAD - Coronary artery disease / 7348215972 / Confirmed  Breast cancer / 134125396 / Confirmed  GERD - Gastro-esophageal reflux disease / 9570423551 / Confirmed  Dyslipidemia / 6489889317 / Confirmed  History of deep vein thrombosis / 4656584263 / Confirmed  Anxiety / 04047051 / Confirmed  Encounter for echocardiogram before initiation of chemotherapy / 569867254 / Confirmed,    Active Problems (14)  Anxiety   Arthropathy   Atherosclerotic heart disease of native coronary artery without angina pectoris   Breast cancer   CAD - Coronary artery disease   COPD - Chronic obstructive pulmonary disease   Dyslipidemia   Encounter for echocardiogram before initiation of chemotherapy   GERD - Gastro-esophageal reflux disease   History of deep vein thrombosis   Hypertension   Hypertension   Hypothyroidism   Hypothyroidism         Histories   Past Medical History:    Active  Hypertension (75632385)  Hypothyroidism (85037202)   Family History:    Cancer  Mother  Brother  Aneurysm  Brother  Thyroid  Mother  Heart disease......  Mother  Father  CVA (cerebrovascular accident).......  Brother  HTN (hypertension)......  Father  Mother  Brother     Procedure  history:    Catheter Insertion Mediport (Right) performed by Marifer Delgado MD on 3/8/2021 at 71 Years.  Comments:  3/8/2021 16:48 Oralia Edward RN  auto-populated from documented surgical case  Breast Reduction (Bilateral) performed by Manuel Mclean MD on 3/8/2021 at 71 Years.  Comments:  3/8/2021 16:48 Oralia Edward RN  auto-populated from documented surgical case  Partial Mastectomy W/Bogard Node Bx (Left) performed by Marifer Delgado MD on 3/8/2021 at 71 Years.  Comments:  3/8/2021 16:48 Oralia Edward RN  auto-populated from documented surgical case  Colonoscopy performed by Adria Sanderson MD on 7/6/2016 at 66 Years.  Comments:  7/6/2016 12:53 CDT - Dino Pack RN  auto-populated from documented surgical case  Cervical spinal fusion (SNOMED CT 201567679) in 2001 at 51 Years.  Cervical spinal fusion (SNOMED CT 135969379) in 1995 at 45 Years.  Partial hysterectomy (SNOMED CT 0155579800) in 1985 at 35 Years.  Cholecystectomy; (CPT4 33299).  Tonsillectomy, primary or secondary; younger than age 12 (CPT4 02577).  knee surgery.  sinus surgery.   Social History        Social & Psychosocial Habits    Alcohol  07/12/2015 Risk Assessment: Denies Alcohol Use    06/19/2018  Use: Never    Employment/School  03/08/2021  Status: Retired    Highest education: None    Substance Use  07/12/2015 Risk Assessment: Denies Substance Abuse    03/04/2021  Use: Never    Tobacco  07/12/2015 Risk Assessment: Denies Tobacco Use    09/30/2021  Use: Former smoker, quit more    Patient Wants Consult For Cessation Counseling N/A    Comment: Quit 8 years ago. - 06/17/2021 10:35 - Garfield BLAIR, Lorena TREJO    Abuse/Neglect  09/30/2021  SHX Any signs of abuse or neglect No    Feels unsafe at home: No    Safe place to go: Yes    Spiritual/Cultural  03/08/2021  Yazidi Preference Mandaen  .        Physical Examination      No qualifying data available   General:  Alert and oriented, No acute  distress.    Eye:  Pupils are equal, round and reactive to light, Extraocular movements are intact, Normal conjunctiva.    HENT:  Normocephalic, Oral mucosa is moist.    Neck:  Supple, Non-tender, No lymphadenopathy.    Respiratory:  Lungs are clear to auscultation, Respirations are non-labored, Breath sounds are equal.    Cardiovascular:  Normal rate, Regular rhythm, No edema.    Gastrointestinal:  Soft, Non-tender, Non-distended, Normal bowel sounds.    Integumentary:  Warm, Dry, Pink, Intact.    Neurologic:  Alert, Oriented, Normal sensory, No focal deficits.    Psychiatric:  Cooperative, Appropriate mood & affect.    Breast:  Deferred today.    Lymphatics:  No lymphadenopathy neck, axilla, groin.    Musculoskeletal:  Normal gait.    Cognition and Speech:  Oriented, Speech clear and coherent.    ECOG Performance Scale: 0 - Fully active; no performance restrictions.      Review / Management   Results review      Impression and Plan   Diagnoses:  1.  pT1c, N0, M0 stage IA triple positive invasive ductal carcinoma of the right breast.    Plan:  Patient has a stage IA triple positive breast cancer.  Due to HER-2 being positive, we treated with Taxol and Herceptin weekly for 12 weeks, completed on 8/19/2021.  She will then complete a years worth of Herceptin, started on 9/9/2021.  Radiation started on 9/2/2021, finished on 9/30/2021.      Echocardiogram completed on 4/14/2021 showed EF of 55-60%. Repeat ECHO on 8/31/2021 with EF 55-60%.    Baseline DEXA scan completed in 4/8/2021 showed mild osteopenia of the left and right femoral neck.    She was seen by Dr. Renee on 8/23/2021; completed radiation on 9/30/2021.    Started maintenance Herceptin on 9/9/2021.  She will complete 1 year worth- OK to proceed with next cycle tomorrow as planned.  We will discuss starting adjuvant endocrine therapy.     Repeat ECHO due in 11/2021- will order at next visit    Return to clinic in 3 weeks  for TD visit and clinical  examination    Labs: CBC, CMP    All questions were answered to the best of my ability.   Scout Prasad II, MD         Professional Services   I, Jeniffer Giraldo LPN, acted solely as a scribe for and in the presence of, Dr. Scout Prasad who performed the service.

## 2022-04-29 NOTE — PROGRESS NOTES
Patient:   Elina Paris            MRN: [id number]            FIN: [id number]               Age:   71 years     Sex:  Female     :  1950   Associated Diagnoses:   None   Author:   Ga MOHAN, Yesy FISHER        Referring Physician:  Marifer Delgado MD         Visit Information     Problem List:   1.  pT1c, N0, M0 stage IA triple positive invasive ductal carcinoma of the right breast.    Treatment Plan:  1.  Weekly taxol and herceptin for 12 weeks followed by a year of maintenance herceptin.  Week 1 given on 2021, week 12 given on 2021.  Maintenance Herceptin started on 2021  2.  Adjuvant endocrine therapy   3.  Adjuvant radiation therapy to start on 2021, completed 2021.    Diagnosis/Treatment/HPI:   71-year-old female who underwent a screening mammogram on 2020 that showed multiple morphologically oval/circumscribed masses throughout both breasts.  This was read as BI-RADS 0, additional imaging recommended.  Diagnostic mammogram and ultrasound of the left breast on 2021 was performed and showed a 1.6 cm irregular mass at the 3 o'clock position, a 1.0 cm oval mass with obscured margins at the 3 o'clock position and a 0.6 cm irregular mass at the 2 o'clock position.  On ultrasound, the 3:00 lesion 8 cm from the nipple measured 1.6 x 1.2 x 1.2 cm.  This was irregular with angular/microlobulated margins.  There was also posterior shadowing.  The 3 o'clock position 3 cm from the nipple showed a 1.0 x 1.0 x 0.7 cm oval circumscribed mass with internal septation.  The 2 o'clock position lesion 5 cm from the nipple measured 0.6 x 0.5 x 0.7 cm, was avascular with peripheral hyper echogenicity and central hypoechogenicity and indistinct margins.  BI-RADS 4, biopsy recommended.  Patient underwent biopsy on 2021, the 3 o'clock position lesion 8 cm from the nipple revealed invasive ductal carcinoma.  The left breast 3 o'clock position 3 cm from the nipple returned as  invasive ductal carcinoma.  The left breast lesion at the 2 o'clock position showed benign breast tissue.  Estrogen receptor was 99.74% positive, progesterone preceptor was 98.57% positive, HER-2 was 2+ by IHC and positive by FISH.  Patient then underwent bilateral breast MRI on 2/25/2021 which revealed biopsy-proven malignancy at the 3 o'clock position in the left breast spanning 5.7 cm without suspicious enhancement in either breast.  Patient then underwent a left sided lumpectomy on 3/8/2021, final pathology revealed a pT1c, N0, M0 stage IA triple positive invasive ductal carcinoma.  The patient subsequently had a bilateral breast reduction.  This was done by Dr. Manuel Mclean.  She was referred to medical oncology.  I saw the patient on 3/31/2021.  At that visit, she stated that she had bilateral breast infection.  She had no other major issues to discuss.  Baseline DEXA scan completed in 4/8/2021 showed mild osteopenia in the left and right femoral necks.  Echocardiogram completed on 4/14/2021 showed EF of 55-60%.  Taxol and herceptin started on 5/13/2021, week 12 given on 8/19/2021.  Repeat ECHO on 8/31/2021 showed EF of 55-60%.  Radiation started on 9/2/2021.  Started maintenance Herceptin on 9/9/2021.  Completed radiation on 9/30/2021.  Repeat ECHO done on 11/29/2021 showed EF of 60 to 65%.         Chief Complaint    Follow Up      Interval History   Patient presents to clinic for scheduled follow up appointment. She is doing well overall w/o any notable complaints aside from continued redness/tenderness of left breast following radiation therapy.  She has been continuing with recommended lotions as directed by Rad/Onc. Denies HA, SOB, CP, N/V/C/D. Denies fevers or signs of infection. CBC today reviewed and stable overall.   Continue endocrine therapy as prescribed. No other questions noted.         PMHx:  1.  Coronary artery disease  2.  COPD  3.  Hyperlipidemia  4.  GERD  5.  Hypertension  6.   Hypothyroidism  7.  Anxiety     PSHx:   1.  Left lumpectomy  2.  Cholecystectomy  3.  Total abdominal hysterectomy and unilateral salpingo-oophorectomy  4.  Knee surgery  5.  Sinus surgery  6.  Tonsillectomy  8.  Cervical spinal fusion    Social Hx:   Denies tobacco, alcohol, or illicit drug use.    Family Hx:   She had a brother that had an aneurysm and a stroke, her mother and brother both had cancer, her mother father and brother had hypertension, her mother and father had heart disease, her mother had thyroid disease.         Review of Systems   12 point review of systems done in full with pertinent positives as described above  Remainder of review systems done in full and unremarkable.   Constitutional:  No fever, No chills, No sweats, No weakness.    Eye:  No double vision, No visual disturbances.    Ear/Nose/Mouth/Throat:  No nasal congestion, No sore throat.    Respiratory:  No shortness of breath, No cough.    Cardiovascular:  No chest pain.    Breast:  Both breasts, Pain.    Gastrointestinal:  No nausea, No vomiting, No diarrhea.    Genitourinary:  No dysuria, No hematuria.    Hematology/Lymphatics:  No bruising tendency, No bleeding tendency.    Musculoskeletal:  No back pain.    Integumentary:  No rash, No pruritus.    Neurologic:  No numbness, No tingling, No headache.    Psychiatric:  No anxiety.       Health Status   Allergies:    Allergic Reactions (Selected)  No Known Allergies,    Allergies (1) Active Reaction  No Known Allergies None Documented     Current medications:  (Selected)   Outpatient Medications  Ordered  acetaminophen: 650 mg, form: Tab, Oral, Once-chemo, first dose 09/30/21 13:00:00 CDT, stop date 09/30/21 13:00:00 CDT, Routine, Weeks 4  Future  Benadryl (for IVPB): 25 mg, form: Injection, IV Piggyback, Once-chemo, Infuse over: 20 minute(s), first dose 01/13/22 13:00:00 CST, stop date 01/13/22 13:00:00 CST, Future Order, Weeks 7  Benadryl (for IVPB): 25 mg, form: Injection, IV  Piggyback, Once-chemo, Infuse over: 20 minute(s), first dose 02/03/22 13:00:00 CST, stop date 02/03/22 13:00:00 CST, Future Order, Weeks 10  Benadryl (for IVPB): 25 mg, form: Injection, IV Piggyback, Once-chemo, Infuse over: 20 minute(s), first dose 12/23/21 13:00:00 CST, stop date 12/23/21 13:00:00 CST, Future Order, Weeks 4  Heparin Flush 100 U/mL - 5 mL: 500 units, form: Injection, IV Push, Once-chemo, first dose 01/13/22 13:00:00 CST, stop date 01/13/22 13:00:00 CST, Routine, Weeks 7, Future Order  Heparin Flush 100 U/mL - 5 mL: 500 units, form: Injection, IV Push, Once-chemo, first dose 02/03/22 13:00:00 CST, stop date 02/03/22 13:00:00 CST, Routine, Weeks 10, Future Order  Heparin Flush 100 U/mL - 5 mL: 500 units, form: Injection, IV Push, Once-chemo, first dose 12/23/21 13:00:00 CST, stop date 12/23/21 13:00:00 CST, Routine, Weeks 4, Future Order  Herceptin: 546 mg, form: Injection, IV Piggyback, Once, Infuse over: 30 minute(s), first dose 01/13/22 13:00:00 CST, stop date 01/13/22 13:00:00 CST, Future Order, Weeks 7  Herceptin: 546 mg, form: Injection, IV Piggyback, Once, Infuse over: 30 minute(s), first dose 02/03/22 13:00:00 CST, stop date 02/03/22 13:00:00 CST, Future Order, Weeks 10  Herceptin: 546 mg, form: Injection, IV Piggyback, Once, Infuse over: 30 minute(s), first dose 12/23/21 13:00:00 CST, stop date 12/23/21 13:00:00 CST, Future Order, Weeks 4  acetaminophen: 650 mg, form: Tab, Oral, Once-chemo, first dose 01/13/22 13:00:00 CST, stop date 01/13/22 13:00:00 CST, Routine, Weeks 7, Future Order  acetaminophen: 650 mg, form: Tab, Oral, Once-chemo, first dose 02/03/22 13:00:00 CST, stop date 02/03/22 13:00:00 CST, Routine, Weeks 10, Future Order  acetaminophen: 650 mg, form: Tab, Oral, Once-chemo, first dose 12/23/21 13:00:00 CST, stop date 12/23/21 13:00:00 CST, Routine, Weeks 4, Future Order  Pending Complete  midazolam: 2 mg, form: Injection, IV Push, q5min, Order duration: 2 dose(s), first dose  21 10:30:00 CST, stop date 21 10:39:00 CST, (up to 5 mg for moderate anxiety)  Prescriptions  Prescribed  Arimidex 1 mg oral tablet: 1 mg = 1 tab(s), Oral, Daily, # 30 tab(s), 3 Refill(s), Pharmacy: [id number], 163, cm, Height/Length Dosing, 21 12:03:00 CST, 92.3, kg, Weight Dosing, 21 12:03:00 CST  Carafate 1 g/10 mL oral suspension: 1 gm = 10 mL, Oral, BID, on an empty stomach, # 280 mL, 1 Refill(s), Pharmacy: [id number], 163, cm, Height/Length Dosing, 06/10/21 9:38:00 CDT, 94.1, kg, Weight Dosing, 06/10/21 9:38:00 CDT  Lomotil 2.5 mg-0.025 mg oral tablet: 1 tab(s), Oral, QID, PRN PRN for loose stool, # 24 tab(s), 1 Refill(s), Pharmacy: [id number], 163, cm, Height/Length Dosing, 21 10:26:00 CDT, 94.1, kg, Weight Dosing, 21 10:26:00 CDT  Phenergan 25 mg Tab: See Instructions, 1 tab(s) Oral q6hr prn N+V, # 30 tab(s), 0 Refill(s), Pharmacy: [id number], 155, cm, Height/Length Dosing, 21 10:44:00 CDT, 97.8, kg, Weight Dosing, 21 10:44:00 CDT  codeine-guaifenesin 10 mg-100 mg/5 mL oral syrup: 5 mL, Oral, q6hr, PRN PRN for cough and congestion, # 120 mL, 0 Refill(s), Pharmacy: [id number], Patient Education Provided, Patient Verbalizes Understanding, 163, cm, Height/Length Dosing, 21 10:09:00 CDT, 91.4, kg, Weight Dosing, ...  olanzapine 5 mg oral tablet, disintegratin mg = 1 tab(s), Oral, Once a day (at bedtime), for nausea, # 30 tab(s), 1 Refill(s), Pharmacy: [facility]acy #[id number], 163, cm, Height/Length Dosing, 21 10:26:00 CDT, 94.1, kg, Weight Dosing, 21 10:26:00 CDT  prochlorperazine 5 mg oral tablet: 5 mg = 1 tab(s), Oral, TID, PRN PRN for nausea/vomiting, # 30 tab(s), 0 Refill(s), Pharmacy: [facility]/pharmacy #[id number], 163, cm, Height/Length Dosing, 21 10:26:00 CDT, 94.1, kg, Weight Dosing, 21 10:26:00 CDT  Documented Medications  Documented  ASPIRIN EC 81 MG TABLET: 81 mg = 1 tab(s), Oral,  Daily  Advil: Oral, q6hr, 0 Refill(s)  Benadryl 25 mg oral capsule: 25 mg = 1 cap(s), Oral, Once a day (at bedtime), PRN PRN for insomnia, 0 Refill(s)  Caltrate 600 + D oral tablet: 1 tab(s), Oral, 0 Refill(s)  Centrum Adults oral tablet: Oral, Daily, 0 Refill(s)  Diltiazem Hydrochloride  mg/24 hours oral capsule, extended release: 0 Refill(s)  Flonase 50 mcg/inh nasal spray: Daily, PRN PRN nasal congestion, 0 Refill(s)  Linzess 290 mcg oral capsule: 290 mcg = 1 cap(s), Oral, Daily, PRN PRN constipation, # 30 cap(s), 0 Refill(s)  Sea cantu gel: Sea cantu gel, 30 mL, Oral, Daily, 0 Refill(s)  Trelegy Ellipta 100 mcg-62.5 mcg-25 mcg/inh inhalation powder: = 1 puff(s), INH, Daily  Xanax 0.25 mg oral tablet: 0.25 mg = 1 tab(s), Oral, Daily  acetaminophen-hydrocodone 325 mg-5 mg oral tablet: 1 tab(s), Oral, q6hr  acetaminophen-hydrocodone 325 mg-7.5 mg oral tablet: 1 tab(s), Oral, q6hr  atorvastatin 40 mg oral tablet: 40 mg = 1 tab(s), Oral, Daily, # 30 tab(s), 0 Refill(s)  clindamycin 300 mg oral capsule: 300 mg = 1 cap(s), Oral, q6hr  cyclobenzaprine 5 mg oral tablet: 5 mg = 1 tab(s), Oral, TID  docusate sodium 100 mg oral capsule: 100 mg = 1 cap(s), Oral, TID  esomeprazole 40 mg oral DR capsule: 40 mg = 1 cap(s), Oral, Daily  estradiol 2 mg oral tablet: 2 mg = 1 tab(s), Oral, Daily  hydrochlorothiazide-triamterene 25 mg-37.5 mg oral tablet: 1 tab(s), Oral, Daily, # 30 tab(s), 0 Refill(s)  levothyroxine 50 mcg (0.05 mg) oral tablet: 50 mcg = 1 tab(s), Oral, Daily, # 30 tab(s), 0 Refill(s)  nitroglycerin 0.4 mg sublingual TAB:   ondansetron 4 mg oral tablet, disintegratin mg = 1 tab(s), Oral, TID  ondansetron 8 mg oral tablet: 8 mg = 1 tab(s), Oral, TID   Problem list:    All Problems  Anxiety / SNOMED CT 65063137 / Confirmed  Arthropathy / SNOMED CT 7513506368 / Confirmed  Atherosclerotic heart disease of native coronary artery without angina pectoris / SNOMED CT 390658832145745 / Confirmed  Breast cancer /  SNOMED CT 004283705 / Confirmed  left  CAD - Coronary artery disease / SNOMED CT 7374914372 / Confirmed  COPD - Chronic obstructive pulmonary disease / SNOMED CT 006020844 / Confirmed  Dyslipidemia / SNOMED CT 4485931079 / Confirmed  GERD - Gastro-esophageal reflux disease / SNOMED CT 7325785661 / Confirmed  History of deep vein thrombosis / SNOMED CT 4972327498 / Confirmed  after TKR- post op  Hypertension / SNOMED CT 66948539 / Confirmed  Hypertension / SNOMED CT 56102361 / Confirmed  Hypothyroidism / SNOMED CT 39636216 / Confirmed  Hypothyroidism / SNOMED CT 83174200 / Confirmed  Encounter for echocardiogram before initiation of chemotherapy / SNOMED CT 252663480 / Confirmed,    Active Problems (14)  Anxiety   Arthropathy   Atherosclerotic heart disease of native coronary artery without angina pectoris   Breast cancer   CAD - Coronary artery disease   COPD - Chronic obstructive pulmonary disease   Dyslipidemia   Encounter for echocardiogram before initiation of chemotherapy   GERD - Gastro-esophageal reflux disease   History of deep vein thrombosis   Hypertension   Hypertension   Hypothyroidism   Hypothyroidism         Physical Examination   Vital Signs   12/22/2021 14:07 CST     Temperature Oral          36.7 DegC                             Temperature Oral (calculated)             98.06 DegF                             Peripheral Pulse Rate     83 bpm                             Respiratory Rate          18 br/min                             SpO2                      96 %                             Oxygen Therapy            Room air                             Systolic Blood Pressure   140 mmHg                             Diastolic Blood Pressure  84 mmHg                             Blood Pressure Location   Right arm                             Manual Cuff BP            No                             O2 SAT at rest            96 %     Measurements from flowsheet : Measurements   12/22/2021 14:07 CST      Weight Dosing             93.300 kg                             Weight Measured           93.3 kg                             Weight Measured and Calculated in Lbs     205.69 lb                             Height/Length Dosing      163.00 cm                             Height/Length Measured    163 cm                             BSA Measured              2.06 m2                             Body Mass Index Measured  35.12 kg/m2             General:  Alert and oriented, No acute distress.    Eye:  Pupils are equal, round and reactive to light, Extraocular movements are intact, Normal conjunctiva.    HENT:  Normocephalic, Oral mucosa is moist.    Neck:  Supple, Non-tender, No lymphadenopathy.    Respiratory:  Lungs are clear to auscultation, Respirations are non-labored, Breath sounds are equal.    Cardiovascular:  Normal rate, Regular rhythm, No edema.    Gastrointestinal:  Soft, Non-tender, Non-distended, Normal bowel sounds.    Integumentary:  Warm, Dry, Pink, Intact.    Neurologic:  Alert, Oriented, Normal sensory, No focal deficits.    Psychiatric:  Cooperative, Appropriate mood & affect.    Breast:  left breast with redness and tenderness .    Lymphatics:  No lymphadenopathy neck, axilla, groin.    Musculoskeletal:  Normal gait.    Cognition and Speech:  Oriented, Speech clear and coherent.    ECOG Performance Scale: 0 - Fully active; no performance restrictions.      Review / Management   Results review:  Last 10 Days Lab Results : Lab View   12/22/2021 13:51 CST     WBC                       7.1 x10(3)/mcL                             RBC                       4.64 x10(6)/mcL                             Hgb                       12.3 gm/dL                             Hct                       39.1 %                             Platelet                  165 x10(3)/mcL                             MCV                       84.3 fL                             MCH                       26.5 pg  LOW                              MCHC                      31.5 gm/dL  LOW                             RDW                       15.7 %                             MPV                       12.0 fL                             Abs Neut                  5.03 x10(3)/mcL                             NEUT%                     70.6 %  NA                             NEUT#                     5.0 x10(3)/mcL                             LYMPH%                    21.6 %  NA                             LYMPH#                    1.5 x10(3)/mcL                             MONO%                     6.2 %  NA                             MONO#                     0.4 x10(3)/mcL                             EOS%                      1.4 %  NA                             EOS#                      0.1 x10(3)/mcL                             BASO%                     0.1 %  NA                             BASO#                     0.0 x10(3)/mcL  .       Impression and Plan   Diagnoses:  1.  pT1c, N0, M0 stage IA triple positive invasive ductal carcinoma of the right breast.    Plan:  Patient has a stage IA triple positive breast cancer.  Due to HER-2 being positive, we treated with Taxol and Herceptin weekly for 12 weeks, completed on 8/19/2021.  She will then complete a years worth of Herceptin, started on 9/9/2021.  Radiation started on 9/2/2021, finished on 9/30/2021.      Echocardiogram completed on 4/14/2021 showed EF of 55-60%. Repeat ECHO on 8/31/2021 with EF 55-60%.  Repeat ECHO done on 11/29/2021    Baseline DEXA scan completed in 4/8/2021 showed mild osteopenia of the left and right femoral neck.    She was seen by Dr. Renee on 8/23/2021; completed radiation on 9/30/2021.    Started maintenance Herceptin on 9/9/2021.  She will complete 1 year worth- OK to proceed with next cycle tomorrow as planned.      Continue endocrine therapy as prescribed     She is continuing to struggle with tenderness/redness to left breast following XRT- recommended  OTC pain control and continuation of recommendation per Rad/Onc     Return to clinic in 3 weeks  for TD visit and clinical examination    Labs: CBC, CMP      All questions were answered to the best of my ability.   Yesy URIOSTEGUI, FNP-C

## 2022-04-29 NOTE — PROGRESS NOTES
Patient:   Elina Paris            MRN: [id number]            FIN: [id number]               Age:   71 years     Sex:  Female     :  1950   Associated Diagnoses:   None   Author:   Osmar CASEY MD, Philippe E        Referring Physician:  Marifer Delgado MD         Visit Information     Problem List:   1.  pT1c, N0, M0 stage IA triple positive invasive ductal carcinoma of the right breast.    Treatment Plan:  1.  Weekly taxol and herceptin for 12 weeks followed by a year of maintenance herceptin.  Week 1 given on 2021.    2.  Adjuvant endocrine therapy   3.  Adjuvant radiation therapy.    Diagnosis/Treatment/HPI:   71-year-old female who underwent a screening mammogram on 2020 that showed multiple morphologically oval/circumscribed masses throughout both breasts.  This was read as BI-RADS 0, additional imaging recommended.  Diagnostic mammogram and ultrasound of the left breast on 2021 was performed and showed a 1.6 cm irregular mass at the 3 o'clock position, a 1.0 cm oval mass with obscured margins at the 3 o'clock position and a 0.6 cm irregular mass at the 2 o'clock position.  On ultrasound, the 3:00 lesion 8 cm from the nipple measured 1.6 x 1.2 x 1.2 cm.  This was irregular with angular/microlobulated margins.  There was also posterior shadowing.  The 3 o'clock position 3 cm from the nipple showed a 1.0 x 1.0 x 0.7 cm oval circumscribed mass with internal septation.  The 2 o'clock position lesion 5 cm from the nipple measured 0.6 x 0.5 x 0.7 cm, was avascular with peripheral hyper echogenicity and central hypoechogenicity and indistinct margins.  BI-RADS 4, biopsy recommended.  Patient underwent biopsy on 2021, the 3 o'clock position lesion 8 cm from the nipple revealed invasive ductal carcinoma.  The left breast 3 o'clock position 3 cm from the nipple returned as invasive ductal carcinoma.  The left breast lesion at the 2 o'clock position showed benign breast tissue.   Estrogen receptor was 99.74% positive, progesterone preceptor was 98.57% positive, HER-2 was 2+ by IHC and positive by FISH.  Patient then underwent bilateral breast MRI on 2/25/2021 which revealed biopsy-proven malignancy at the 3 o'clock position in the left breast spanning 5.7 cm without suspicious enhancement in either breast.  Patient then underwent a left sided lumpectomy on 3/8/2021, final pathology revealed a pT1c, N0, M0 stage IA triple positive invasive ductal carcinoma.  The patient subsequently had a bilateral breast reduction.  This was done by Dr. Manuel Mclean.  She was referred to medical oncology.  I saw the patient on 3/31/2021.  At that visit, she stated that she had bilateral breast infection.  She had no other major issues to discuss.  Baseline DEXA scan completed in 4/8/2021 showed mild osteopenia in the left and right femoral necks.  Echocardiogram completed on 4/14/2021 showed EF of 55-60%.  Taxol and herceptin started on 5/13/2021.           Chief Complaint    I have breast cancer       5/26/2021 10:23 CDT      patient stated that she has been feeling weak and nauseated. she stated that everything makes her sick to her stomach    5/26/2021 10:18 CDT      patient stated that she has been feeling weak and nauseated. she stated that everything makes her sick to her stomach        Interval History   Patient presents to clinic for scheduled follow up appointment; family member present.  Patient reports weakness, nausea, poor appetite, and GI upset. She did feel okay over the weekend. Denies CP, Shortness of breath, constipation, fever, chills, or recurrent infections. No other questions or concerns.        PMHx:  1.  Coronary artery disease  2.  COPD  3.  Hyperlipidemia  4.  GERD  5.  Hypertension  6.  Hypothyroidism  7.  Anxiety     PSHx:   1.  Left lumpectomy  2.  Cholecystectomy  3.  Total abdominal hysterectomy and unilateral salpingo-oophorectomy  4.  Knee surgery  5.  Sinus surgery  6.   Tonsillectomy  8.  Cervical spinal fusion    Social Hx:   Denies tobacco, alcohol, or illicit drug use.    Family Hx:   She had a brother that had an aneurysm and a stroke, her mother and brother both had cancer, her mother father and brother had hypertension, her mother and father had heart disease, her mother had thyroid disease.         Review of Systems   12 point review of systems done in full with pertinent positives as described above  Remainder of review systems done in full and unremarkable.   Constitutional:  No fever, No chills, No sweats, No weakness.    Eye:  No double vision, No visual disturbances.    Ear/Nose/Mouth/Throat:  No nasal congestion, No sore throat.    Respiratory:  No shortness of breath, No cough.    Cardiovascular:  No chest pain.    Breast:  Both breasts, Pain.    Gastrointestinal:  No nausea, No vomiting, No diarrhea.    Genitourinary:  No dysuria, No hematuria.    Hematology/Lymphatics:  No bruising tendency, No bleeding tendency.    Musculoskeletal:  No back pain.    Integumentary:  No rash, No pruritus.    Neurologic:  No numbness, No tingling, No headache.    Psychiatric:  No anxiety.       Health Status   Allergies:    Allergic Reactions (Selected)  No Known Allergies,    Allergies (1) Active Reaction  No Known Allergies None Documented     Current medications:  (Selected)   Outpatient Medications  Future  Benadryl 50mg/ml Inj: 25 mg, form: Injection, IV Piggyback, Once-chemo, Infuse over: 20 minute(s), *Est. first dose 05/27/21 8:40:00 CDT, *Est. stop date 05/27/21 8:40:00 CDT, Routine, Weeks 3, Future Order  Emend (for IVPB): 150 mg, form: Powder-Inj, IV Piggyback, Once-chemo, Infuse over: 30 minute(s), first dose 05/27/21 8:00:00 CDT, stop date 05/27/21 8:00:00 CDT, Future Order, Weeks 3  Heparin Flush 100 U/mL - 5 mL: 500 units, form: Injection, IV Push, Once-chemo, *Est. first dose 05/27/21 10:30:00 CDT, *Est. stop date 05/27/21 10:30:00 CDT, Routine, Weeks 3, Future  Order  Herceptin (for IVPB): 189 mg, form: Injection, IV Piggyback, Once-chemo, Infuse over: 30 minute(s), first dose 05/27/21 8:00:00 CDT, stop date 05/27/21 8:00:00 CDT, Future Order, Weeks 3  Pepcid (for IVPB): 20 mg, form: Injection, IV Piggyback, Once-chemo, Infuse over: 20 minute(s), *Est. first dose 05/27/21 8:40:00 CDT, *Est. stop date 05/27/21 8:40:00 CDT, Future Order, Weeks 3  Taxol (for IVPB): 156 mg, form: Injection, IV Piggyback, Once-chemo, Infuse over: 1 hr, *Est. first dose 05/27/21 9:00:00 CDT, *Est. stop date 05/27/21 9:00:00 CDT, Future Order, Weeks 3  acetaminophen: 650 mg, form: Tab, Oral, Once-chemo, *Est. first dose 05/27/21 8:40:00 CDT, *Est. stop date 05/27/21 8:40:00 CDT, Routine, Weeks 3, Future Order  dexamethasone (for IVPB): 10 mg, form: Soln, IV Piggyback, Once-chemo, Infuse over: 20 minute(s), *Est. first dose 05/27/21 8:40:00 CDT, *Est. stop date 05/27/21 8:40:00 CDT, Future Order, Weeks 3  ondansetron (for IVPB): 16 mg, form: Injection, IV Piggyback, Once-chemo, Infuse over: 20 minute(s), first dose 05/27/21 8:40:00 CDT, stop date 05/27/21 8:40:00 CDT, Routine, Weeks 3, Future Order  Pending Complete  midazolam: 2 mg, form: Injection, IV Push, q5min, Order duration: 2 dose(s), first dose 03/08/21 10:30:00 CST, stop date 03/08/21 10:39:00 CST, (up to 5 mg for moderate anxiety)  Prescriptions  Prescribed  Phenergan 25 mg Tab: See Instructions, 1 tab(s) Oral q6hr prn N+V, # 30 tab(s), 0 Refill(s), Pharmacy: [facility] #[id number], 155, cm, Height/Length Dosing, 05/13/21 10:44:00 CDT, 97.8, kg, Weight Dosing, 05/13/21 10:44:00 CDT  Documented Medications  Documented  ASPIRIN EC 81 MG TABLET: 81 mg = 1 tab(s), Oral, Daily  Benadryl 25 mg oral capsule: 25 mg = 1 cap(s), Oral, Once a day (at bedtime), PRN PRN for insomnia, 0 Refill(s)  Caltrate 600 + D oral tablet: 1 tab(s), Oral, 0 Refill(s)  Centrum Adults oral tablet: Oral, Daily, 0 Refill(s)  Diltiazem Hydrochloride  mg/24 hours  oral capsule, extended release: 0 Refill(s)  Flonase 50 mcg/inh nasal spray: Daily, PRN PRN nasal congestion, 0 Refill(s)  Linzess 290 mcg oral capsule: 290 mcg = 1 cap(s), Oral, Daily, PRN PRN constipation, # 30 cap(s), 0 Refill(s)  Sea cantu gel: Sea cantu gel, 30 mL, Oral, Daily, 0 Refill(s)  Trelegy Ellipta 100 mcg-62.5 mcg-25 mcg/inh inhalation powder: = 1 puff(s), INH, Daily  Xanax 0.25 mg oral tablet: 0.25 mg = 1 tab(s), Oral, Daily  atorvastatin 40 mg oral tablet: 40 mg = 1 tab(s), Oral, Daily, # 30 tab(s), 0 Refill(s)  atropine-diphenoxylate 0.025 mg-2.5 mg oral tablet: 2 tab(s), Oral, QID  esomeprazole 40 mg oral DR capsule: 40 mg = 1 cap(s), Oral, Daily  hydrochlorothiazide-triamterene 25 mg-37.5 mg oral tablet: 1 tab(s), Oral, Daily, # 30 tab(s), 0 Refill(s)  levothyroxine 50 mcg (0.05 mg) oral tablet: 50 mcg = 1 tab(s), Oral, Daily, # 30 tab(s), 0 Refill(s)  nitroglycerin 0.4 mg sublingual TAB:   ondansetron 8 mg oral tablet: 8 mg = 1 tab(s), Oral, TID   Problem list:    All Problems  Hypothyroidism / 61906200 / Confirmed  Hypertension / 96707038 / Confirmed  Hypertension / 92485500 / Confirmed  Hypothyroidism / 72329763 / Confirmed  COPD - Chronic obstructive pulmonary disease / 382168305 / Confirmed  Arthropathy / 9256052113 / Confirmed  Atherosclerotic heart disease of native coronary artery without angina pectoris / 184101540154756 / Confirmed  CAD - Coronary artery disease / 7971400181 / Confirmed  Breast cancer / 398165715 / Confirmed  GERD - Gastro-esophageal reflux disease / 3522454236 / Confirmed  Dyslipidemia / 4275701370 / Confirmed  History of deep vein thrombosis / 3764748759 / Confirmed  Anxiety / 53200715 / Confirmed  Encounter for echocardiogram before initiation of chemotherapy / 333101755 / Confirmed,    Active Problems (14)  Anxiety   Arthropathy   Atherosclerotic heart disease of native coronary artery without angina pectoris   Breast cancer   CAD - Coronary artery disease   COPD -  Chronic obstructive pulmonary disease   Dyslipidemia   Encounter for echocardiogram before initiation of chemotherapy   GERD - Gastro-esophageal reflux disease   History of deep vein thrombosis   Hypertension   Hypertension   Hypothyroidism   Hypothyroidism         Histories   Past Medical History:    Active  Hypertension (80465671)  Hypothyroidism (16730130)   Family History:    Cancer  Mother  Brother  Aneurysm  Brother  Thyroid  Mother  Heart disease......  Mother  Father  CVA (cerebrovascular accident).......  Brother  HTN (hypertension)......  Father  Mother  Brother     Procedure history:    Catheter Insertion Mediport (Right) performed by Marifer Delgado MD on 3/8/2021 at 71 Years.  Comments:  3/8/2021 16:48 Oralia Edward RN  auto-populated from documented surgical case  Breast Reduction (Bilateral) performed by Manuel Mclean MD on 3/8/2021 at 71 Years.  Comments:  3/8/2021 16:48 Oralia Edward RN  auto-populated from documented surgical case  Partial Mastectomy W/[place] Node Bx (Left) performed by Marifer Delgado MD on 3/8/2021 at 71 Years.  Comments:  3/8/2021 16:48 Oralia Edward RN  auto-populated from documented surgical case  Colonoscopy performed by Adria Sanderson MD on 7/6/2016 at 66 Years.  Comments:  7/6/2016 12:53 CDT - Dino Pack RN  auto-populated from documented surgical case  Cervical spinal fusion (SNOMED CT 998477499) in 2001 at 51 Years.  Cervical spinal fusion (SNOMED CT 870918837) in 1995 at 45 Years.  Partial hysterectomy (SNOMED CT 2922469004) in 1985 at 35 Years.  Cholecystectomy; (CPT4 48157).  Tonsillectomy, primary or secondary; younger than age 12 (CPT4 01256).  knee surgery.  sinus surgery.   Social History        Social & Psychosocial Habits    Alcohol  07/12/2015 Risk Assessment: Denies Alcohol Use    06/19/2018  Use: Never    Employment/School  03/08/2021  Status: Retired    Highest education: None    Substance  Use  07/12/2015 Risk Assessment: Denies Substance Abuse    03/04/2021  Use: Never    Tobacco  07/12/2015 Risk Assessment: Denies Tobacco Use    05/26/2021  Use: Former smoker, quit more    Patient Wants Consult For Cessation Counseling N/A    Abuse/Neglect  05/26/2021  SHX Any signs of abuse or neglect No    Feels unsafe at home: No    Safe place to go: Yes    Spiritual/Cultural  03/08/2021  L[employer] Preference [employer]  .        Physical Examination   Vital Signs   5/26/2021 10:23 CDT      Temperature Oral          36.9 DegC                             Temperature Oral (calculated)             98.42 DegF                             Peripheral Pulse Rate     80 bpm                             Respiratory Rate          18 br/min                             SpO2                      97 %                             Oxygen Therapy            Room air                             Systolic Blood Pressure   128 mmHg                             Diastolic Blood Pressure  81 mmHg                             Blood Pressure Location   Left arm                             Manual Cuff BP            No                             O2 SAT at rest            97 %     Measurements from flowsheet : Measurements   5/26/2021 10:23 CDT      Weight Dosing             94.100 kg                             Weight Measured           94.1 kg                             Weight Measured and Calculated in Lbs     207.45 lb                             Height/Length Dosing      163.00 cm                             Height/Length Measured    163 cm                             BSA Measured              2.06 m2                             Body Mass Index Measured  35.42 kg/m2        Vital Signs (last 24 hrs)_____  Last Charted___________  Temp Oral     36.9 DegC  (MAY 26 10:23)  Heart Rate Peripheral   80 bpm  (MAY 26 10:23)  Resp Rate         18 br/min  (MAY 26 10:23)  SBP      128 mmHg  (MAY 26 10:23)  DBP      81 mmHg  (MAY 26 10:23)  SpO2      97  %  (MAY 26 10:23)  Weight      94.1 kg  (MAY 26 10:23)  Height      163 cm  (MAY 26 10:23)  BMI      35.42  (MAY 26 10:23)     General:  Alert and oriented, No acute distress.    Eye:  Extraocular movements are intact, Normal conjunctiva.    HENT:  Normocephalic, Oral mucosa is moist.    Neck:  Supple, No lymphadenopathy.    Respiratory:  Lungs are clear to auscultation, Respirations are non-labored, Breath sounds are equal.    Cardiovascular:  Normal rate, Regular rhythm, No edema.    Gastrointestinal:  Soft, Non-tender, Non-distended, Normal bowel sounds.    Integumentary:  Warm, Dry, Intact.    Neurologic:  Alert, Oriented, Normal sensory, No focal deficits.    Psychiatric:  Cooperative, Appropriate mood & affect.    Breast:  Bilateral breast exam revealed resolved infection with no evidence of drainage from her wounds..    Lymphatics:  No lymphadenopathy neck, axilla, groin.    Cognition and Speech:  Oriented, Speech clear and coherent.    ECOG Performance Scale: 0 - Fully active; no performance restrictions.      Review / Management   Results review:  Lab results   5/26/2021 9:54 CDT       WBC                       6.6 x10(3)/mcL                             RBC                       4.52 x10(6)/mcL                             Hgb                       12.7 gm/dL                             Hct                       39.2 %                             Platelet                  296 x10(3)/mcL                             MCV                       86.7 fL                             MCH                       28.1 pg                             MCHC                      32.4 gm/dL  LOW                             RDW                       13.6 %                             MPV                       10.4 fL                             Abs Neut                  4.14 x10(3)/mcL                             NEUT%                     63.1 %  NA                             NEUT#                     4.1 x10(3)/mcL                              LYMPH%                    29.2 %  NA                             LYMPH#                    1.9 x10(3)/mcL                             MONO%                     5.2 %  NA                             MONO#                     0.3 x10(3)/mcL                             EOS%                      1.7 %  NA                             EOS#                      0.1 x10(3)/mcL                             BASO%                     0.2 %  NA                             BASO#                     0.0 x10(3)/mcL  .       Impression and Plan   Diagnoses:  1.  pT1c, N0, M0 stage IA triple positive invasive ductal carcinoma of the right breast.    Plan:  Patient has a stage IA triple positive breast cancer.  Due to HER-2 being positive, we will plan to treat her with Taxol and Herceptin weekly for 12 weeks.  She will then complete a years worth of Herceptin.  We are planning on starting on 5/13/2021 due to her bilateral breast infection and recent breast reduction.    Echocardiogram completed on 4/14/2021 showed EF of 55-60%.     Baseline DEXA scan completed in 4/8/2021 showed mild osteopenia of the left and right femoral neck.    Okay to proceed with treatment tomorrow    Will send Rx for Nausea, Diarrhea, and GI upset: Lomotil, Prochlorperazine, Olanzapine    Return to clinic in 3 weeks for TD visit and clinical examination     Labs: CBC, CMP    All questions were answered to the best of my ability and she understands the plan moving forward    Scout Prasad II, MD         Professional Services   I, Jeniffer Giraldo LPN, acted solely as a scribe for and in the presence of, Dr. Scout Prasad who performed the service.

## 2022-04-29 NOTE — PROGRESS NOTES
Patient:   Elina Paris            MRN: [id number]            FIN: [id number]               Age:   71 years     Sex:  Female     :  1950   Associated Diagnoses:   None   Author:   Osmar CASEY MD, Philippe E        Referring Physician:  Marifer Delgado MD         Visit Information     Problem List:   1.  pT1c, N0, M0 stage IA triple positive invasive ductal carcinoma of the right breast.    Treatment Plan:  1.  Weekly taxol and herceptin for 12 weeks followed by a year of maintenance herceptin.  Week 1 given on 2021.    2.  Adjuvant endocrine therapy   3.  Adjuvant radiation therapy.    Diagnosis/Treatment/HPI:   71-year-old female who underwent a screening mammogram on 2020 that showed multiple morphologically oval/circumscribed masses throughout both breasts.  This was read as BI-RADS 0, additional imaging recommended.  Diagnostic mammogram and ultrasound of the left breast on 2021 was performed and showed a 1.6 cm irregular mass at the 3 o'clock position, a 1.0 cm oval mass with obscured margins at the 3 o'clock position and a 0.6 cm irregular mass at the 2 o'clock position.  On ultrasound, the 3:00 lesion 8 cm from the nipple measured 1.6 x 1.2 x 1.2 cm.  This was irregular with angular/microlobulated margins.  There was also posterior shadowing.  The 3 o'clock position 3 cm from the nipple showed a 1.0 x 1.0 x 0.7 cm oval circumscribed mass with internal septation.  The 2 o'clock position lesion 5 cm from the nipple measured 0.6 x 0.5 x 0.7 cm, was avascular with peripheral hyper echogenicity and central hypoechogenicity and indistinct margins.  BI-RADS 4, biopsy recommended.  Patient underwent biopsy on 2021, the 3 o'clock position lesion 8 cm from the nipple revealed invasive ductal carcinoma.  The left breast 3 o'clock position 3 cm from the nipple returned as invasive ductal carcinoma.  The left breast lesion at the 2 o'clock position showed benign breast tissue.   Estrogen receptor was 99.74% positive, progesterone preceptor was 98.57% positive, HER-2 was 2+ by IHC and positive by FISH.  Patient then underwent bilateral breast MRI on 2/25/2021 which revealed biopsy-proven malignancy at the 3 o'clock position in the left breast spanning 5.7 cm without suspicious enhancement in either breast.  Patient then underwent a left sided lumpectomy on 3/8/2021, final pathology revealed a pT1c, N0, M0 stage IA triple positive invasive ductal carcinoma.  The patient subsequently had a bilateral breast reduction.  This was done by Dr. Manuel Mclean.  She was referred to medical oncology.  I saw the patient on 3/31/2021.  At that visit, she stated that she had bilateral breast infection.  She had no other major issues to discuss.  Baseline DEXA scan completed in 4/8/2021 showed mild osteopenia in the left and right femoral necks.  Echocardiogram completed on 4/14/2021 showed EF of 55-60%.  Taxol and herceptin started on 5/13/2021.           Chief Complaint    Follow Up   8/4/2021 10:07 CDT       no issues today-- was dx with COVID in July, has lingering dry cough at night        Interval History   Patient presents to clinic for scheduled follow up appointment.  She recently was diagnosed with COVID-19 and this interrupted her chemotherapy after cycle 10.  She stated that she had terrible headache, weakness, fevers, pain all over her body, but she has recovered other than a chronic cough that does occasionally keep her up at night.  Otherwise, she has no sequela from her recent infection.  She is ready to get started back on treatment so she can start her radiation at the end of this month.      PMHx:  1.  Coronary artery disease  2.  COPD  3.  Hyperlipidemia  4.  GERD  5.  Hypertension  6.  Hypothyroidism  7.  Anxiety     PSHx:   1.  Left lumpectomy  2.  Cholecystectomy  3.  Total abdominal hysterectomy and unilateral salpingo-oophorectomy  4.  Knee surgery  5.  Sinus surgery  6.   Tonsillectomy  8.  Cervical spinal fusion    Social Hx:   Denies tobacco, alcohol, or illicit drug use.    Family Hx:   She had a brother that had an aneurysm and a stroke, her mother and brother both had cancer, her mother father and brother had hypertension, her mother and father had heart disease, her mother had thyroid disease.         Review of Systems   12 point review of systems done in full with pertinent positives as described above  Remainder of review systems done in full and unremarkable.   Constitutional:  No fever, No chills, No sweats, No weakness.    Eye:  No double vision, No visual disturbances.    Ear/Nose/Mouth/Throat:  No nasal congestion, No sore throat.    Respiratory:  No shortness of breath, No cough.    Cardiovascular:  No chest pain.    Breast:  Both breasts, Pain.    Gastrointestinal:  No nausea, No vomiting, No diarrhea.    Genitourinary:  No dysuria, No hematuria.    Hematology/Lymphatics:  No bruising tendency, No bleeding tendency.    Musculoskeletal:  No back pain.    Integumentary:  No rash, No pruritus.    Neurologic:  No numbness, No tingling, No headache.    Psychiatric:  No anxiety.       Health Status   Allergies:    Allergic Reactions (Selected)  No Known Allergies,    Allergies (1) Active Reaction  No Known Allergies None Documented     Current medications:  (Selected)   Outpatient Medications  Future  Aloxi (for IVPB): 0.25 mg, form: Injection, IV Piggyback, Once-chemo, Infuse over: 20 minute(s), *Est. first dose 08/12/21 8:40:00 CDT, *Est. stop date 08/12/21 8:40:00 CDT, Future Order, Weeks 1  Aloxi (for IVPB): 0.25 mg, form: Injection, IV Piggyback, Once-chemo, Infuse over: 20 minute(s), *Est. first dose 08/19/21 8:40:00 CDT, *Est. stop date 08/19/21 8:40:00 CDT, Future Order, Weeks 2  Aloxi (for IVPB): 0.25 mg, form: Injection, IV Piggyback, Once-chemo, Infuse over: 20 minute(s), *Est. first dose 08/26/21 8:40:00 CDT, *Est. stop date 08/26/21 8:40:00 CDT, Future Order,  Weeks 3  Aloxi (for IVPB): 0.25 mg, form: Injection, IV Piggyback, Once-chemo, Infuse over: 20 minute(s), first dose 07/22/21 10:10:00 CDT, stop date 07/22/21 10:10:00 CDT, Future Order, Weeks 2  Aloxi (for IVPB): 0.25 mg, form: Injection, IV Piggyback, Once-chemo, Infuse over: 20 minute(s), first dose 07/29/21 11:10:00 CDT, stop date 07/29/21 11:10:00 CDT, Future Order, Weeks 3  Benadryl 50mg/ml Inj: 25 mg, form: Injection, IV Piggyback, Once-chemo, Infuse over: 20 minute(s), *Est. first dose 08/12/21 8:40:00 CDT, *Est. stop date 08/12/21 8:40:00 CDT, Routine, Weeks 1, Future Order  Benadryl 50mg/ml Inj: 25 mg, form: Injection, IV Piggyback, Once-chemo, Infuse over: 20 minute(s), *Est. first dose 08/19/21 8:40:00 CDT, *Est. stop date 08/19/21 8:40:00 CDT, Routine, Weeks 2, Future Order  Benadryl 50mg/ml Inj: 25 mg, form: Injection, IV Piggyback, Once-chemo, Infuse over: 20 minute(s), *Est. first dose 08/26/21 8:40:00 CDT, *Est. stop date 08/26/21 8:40:00 CDT, Routine, Weeks 3, Future Order  Benadryl 50mg/ml Inj: 25 mg, form: Injection, IV Piggyback, Once-chemo, Infuse over: 20 minute(s), first dose 07/22/21 10:10:00 CDT, stop date 07/22/21 10:10:00 CDT, Routine, Weeks 2, Future Order  Benadryl 50mg/ml Inj: 25 mg, form: Injection, IV Piggyback, Once-chemo, Infuse over: 20 minute(s), first dose 07/29/21 11:10:00 CDT, stop date 07/29/21 11:10:00 CDT, Routine, Weeks 3, Future Order  Emend (for IVPB): 150 mg, form: Powder-Inj, IV Piggyback, Once-chemo, Infuse over: 30 minute(s), *Est. first dose 08/12/21 8:00:00 CDT, *Est. stop date 08/12/21 8:00:00 CDT, Future Order, Weeks 1  Emend (for IVPB): 150 mg, form: Powder-Inj, IV Piggyback, Once-chemo, Infuse over: 30 minute(s), *Est. first dose 08/19/21 8:00:00 CDT, *Est. stop date 08/19/21 8:00:00 CDT, Future Order, Weeks 2  Emend (for IVPB): 150 mg, form: Powder-Inj, IV Piggyback, Once-chemo, Infuse over: 30 minute(s), *Est. first dose 08/26/21 8:00:00 CDT, *Est. stop date  08/26/21 8:00:00 CDT, Future Order, Weeks 3  Emend (for IVPB): 150 mg, form: Powder-Inj, IV Piggyback, Once-chemo, Infuse over: 30 minute(s), first dose 07/22/21 9:30:00 CDT, stop date 07/22/21 9:30:00 CDT, Future Order, Weeks 2  Emend (for IVPB): 150 mg, form: Powder-Inj, IV Piggyback, Once-chemo, Infuse over: 30 minute(s), first dose 07/29/21 10:30:00 CDT, stop date 07/29/21 10:30:00 CDT, Future Order, Weeks 3  Heparin Flush 100 U/mL - 5 mL: 500 units, form: Injection, IV Push, Once-chemo, *Est. first dose 08/12/21 10:30:00 CDT, *Est. stop date 08/12/21 10:30:00 CDT, Routine, Weeks 1, Future Order  Heparin Flush 100 U/mL - 5 mL: 500 units, form: Injection, IV Push, Once-chemo, *Est. first dose 08/19/21 10:30:00 CDT, *Est. stop date 08/19/21 10:30:00 CDT, Routine, Weeks 2, Future Order  Heparin Flush 100 U/mL - 5 mL: 500 units, form: Injection, IV Push, Once-chemo, *Est. first dose 08/26/21 10:30:00 CDT, *Est. stop date 08/26/21 10:30:00 CDT, Routine, Weeks 3, Future Order  Heparin Flush 100 U/mL - 5 mL: 500 units, form: Injection, IV Push, Once-chemo, first dose 07/22/21 12:00:00 CDT, stop date 07/22/21 12:00:00 CDT, Routine, Weeks 2, Future Order  Heparin Flush 100 U/mL - 5 mL: 500 units, form: Injection, IV Push, Once-chemo, first dose 07/29/21 13:00:00 CDT, stop date 07/29/21 13:00:00 CDT, Routine, Weeks 3, Future Order  Herceptin (for IVPB): 182.8 mg, form: Injection, IV Piggyback, Once-chemo, Infuse over: 30 minute(s), *Est. first dose 08/12/21 8:00:00 CDT, *Est. stop date 08/12/21 8:00:00 CDT, Future Order, Weeks 1  Herceptin (for IVPB): 182.8 mg, form: Injection, IV Piggyback, Once-chemo, Infuse over: 30 minute(s), *Est. first dose 08/19/21 8:00:00 CDT, *Est. stop date 08/19/21 8:00:00 CDT, Future Order, Weeks 2  Herceptin (for IVPB): 182.8 mg, form: Injection, IV Piggyback, Once-chemo, Infuse over: 30 minute(s), *Est. first dose 08/26/21 8:00:00 CDT, *Est. stop date 08/26/21 8:00:00 CDT, Future Order,  Weeks 3  Herceptin (for IVPB): 189 mg, form: Injection, IV Piggyback, Once-chemo, Infuse over: 30 minute(s), first dose 07/22/21 9:30:00 CDT, stop date 07/22/21 9:30:00 CDT, Future Order, Weeks 2  Herceptin (for IVPB): 189 mg, form: Injection, IV Piggyback, Once-chemo, Infuse over: 30 minute(s), first dose 07/29/21 10:30:00 CDT, stop date 07/29/21 10:30:00 CDT, Future Order, Weeks 3  Normal Saline (0.9% NS) IV: 1,000 mL, IV, Once, 125 mL/hr, *Est. start date 08/12/21 8:00:00 CDT, *Est. stop date 08/12/21 8:00:00 CDT, Weeks 1  Normal Saline (0.9% NS) IV: 1,000 mL, IV, Once, 125 mL/hr, *Est. start date 08/19/21 8:00:00 CDT, *Est. stop date 08/19/21 8:00:00 CDT, Weeks 2  Normal Saline (0.9% NS) IV: 1,000 mL, IV, Once, 125 mL/hr, *Est. start date 08/26/21 8:00:00 CDT, *Est. stop date 08/26/21 8:00:00 CDT, Weeks 3  Normal Saline (0.9% NS) IV: 1,000 mL, IV, Once, 125 mL/hr, start date 07/22/21 10:00:00 CDT, stop date 07/22/21 10:00:00 CDT, Weeks 2  Normal Saline (0.9% NS) IV: 1,000 mL, IV, Once, 125 mL/hr, start date 07/29/21 11:00:00 CDT, stop date 07/29/21 11:00:00 CDT, Weeks 3  Pepcid (for IVPB): 40 mg, form: Injection, IV Piggyback, Once-chemo, Infuse over: 20 minute(s), *Est. first dose 08/12/21 8:40:00 CDT, *Est. stop date 08/12/21 8:40:00 CDT, Future Order, Weeks 1  Pepcid (for IVPB): 40 mg, form: Injection, IV Piggyback, Once-chemo, Infuse over: 20 minute(s), *Est. first dose 08/19/21 8:40:00 CDT, *Est. stop date 08/19/21 8:40:00 CDT, Future Order, Weeks 2  Pepcid (for IVPB): 40 mg, form: Injection, IV Piggyback, Once-chemo, Infuse over: 20 minute(s), *Est. first dose 08/26/21 8:40:00 CDT, *Est. stop date 08/26/21 8:40:00 CDT, Future Order, Weeks 3  Pepcid (for IVPB): 40 mg, form: Injection, IV Piggyback, Once-chemo, Infuse over: 20 minute(s), first dose 07/22/21 10:10:00 CDT, stop date 07/22/21 10:10:00 CDT, Future Order, Weeks 2  Pepcid (for IVPB): 40 mg, form: Injection, IV Piggyback, Once-chemo, Infuse over:  20 minute(s), first dose 07/29/21 11:10:00 CDT, stop date 07/29/21 11:10:00 CDT, Future Order, Weeks 3  Taxol (for IVPB): 153 mg, form: Injection, IV Piggyback, Once-chemo, Infuse over: 1 hr, first dose 07/22/21 10:30:00 CDT, stop date 07/22/21 10:30:00 CDT, Future Order, Weeks 2  Taxol (for IVPB): 153 mg, form: Injection, IV Piggyback, Once-chemo, Infuse over: 1 hr, first dose 07/29/21 11:30:00 CDT, stop date 07/29/21 11:30:00 CDT, Future Order, Weeks 3  Taxol (for IVPB): 157.6 mg, form: Injection, IV Piggyback, Once-chemo, Infuse over: 1 hr, *Est. first dose 08/12/21 9:00:00 CDT, *Est. stop date 08/12/21 9:00:00 CDT, Future Order, Weeks 1  Taxol (for IVPB): 157.6 mg, form: Injection, IV Piggyback, Once-chemo, Infuse over: 1 hr, *Est. first dose 08/19/21 9:00:00 CDT, *Est. stop date 08/19/21 9:00:00 CDT, Future Order, Weeks 2  Taxol (for IVPB): 157.6 mg, form: Injection, IV Piggyback, Once-chemo, Infuse over: 1 hr, *Est. first dose 08/26/21 9:00:00 CDT, *Est. stop date 08/26/21 9:00:00 CDT, Future Order, Weeks 3  acetaminophen: 650 mg, form: Tab, Oral, Once-chemo, *Est. first dose 08/12/21 8:40:00 CDT, *Est. stop date 08/12/21 8:40:00 CDT, Routine, Weeks 1, Future Order  acetaminophen: 650 mg, form: Tab, Oral, Once-chemo, *Est. first dose 08/19/21 8:40:00 CDT, *Est. stop date 08/19/21 8:40:00 CDT, Routine, Weeks 2, Future Order  acetaminophen: 650 mg, form: Tab, Oral, Once-chemo, *Est. first dose 08/26/21 8:40:00 CDT, *Est. stop date 08/26/21 8:40:00 CDT, Routine, Weeks 3, Future Order  acetaminophen: 650 mg, form: Tab, Oral, Once-chemo, first dose 07/22/21 10:10:00 CDT, stop date 07/22/21 10:10:00 CDT, Routine, Weeks 2, Future Order  acetaminophen: 650 mg, form: Tab, Oral, Once-chemo, first dose 07/29/21 11:10:00 CDT, stop date 07/29/21 11:10:00 CDT, Routine, Weeks 3, Future Order  dexamethasone (for IVPB): 10 mg, form: Soln, IV Piggyback, Once-chemo, Infuse over: 20 minute(s), *Est. first dose 08/12/21 8:40:00  CDT, *Est. stop date 08/12/21 8:40:00 CDT, Future Order, Weeks 1  dexamethasone (for IVPB): 10 mg, form: Soln, IV Piggyback, Once-chemo, Infuse over: 20 minute(s), *Est. first dose 08/19/21 8:40:00 CDT, *Est. stop date 08/19/21 8:40:00 CDT, Future Order, Weeks 2  dexamethasone (for IVPB): 10 mg, form: Soln, IV Piggyback, Once-chemo, Infuse over: 20 minute(s), *Est. first dose 08/26/21 8:40:00 CDT, *Est. stop date 08/26/21 8:40:00 CDT, Future Order, Weeks 3  dexamethasone (for IVPB): 10 mg, form: Soln, IV Piggyback, Once-chemo, Infuse over: 20 minute(s), first dose 07/22/21 10:10:00 CDT, stop date 07/22/21 10:10:00 CDT, Future Order, Weeks 2  dexamethasone (for IVPB): 10 mg, form: Soln, IV Piggyback, Once-chemo, Infuse over: 20 minute(s), first dose 07/29/21 11:10:00 CDT, stop date 07/29/21 11:10:00 CDT, Future Order, Weeks 3  Pending Complete  midazolam: 2 mg, form: Injection, IV Push, q5min, Order duration: 2 dose(s), first dose 03/08/21 10:30:00 CST, stop date 03/08/21 10:39:00 CST, (up to 5 mg for moderate anxiety)  Prescriptions  Prescribed  Carafate 1 g/10 mL oral suspension: 1 gm = 10 mL, Oral, BID, on an empty stomach, # 280 mL, 1 Refill(s), Pharmacy: [facility]/pharmacy #[id number], 163, cm, Height/Length Dosing, 06/10/21 9:38:00 CDT, 94.1, kg, Weight Dosing, 06/10/21 9:38:00 CDT  Lomotil 2.5 mg-0.025 mg oral tablet: 1 tab(s), Oral, QID, PRN PRN for loose stool, # 24 tab(s), 1 Refill(s), Pharmacy: [facility]/pharmacy #[id number], 163, cm, Height/Length Dosing, 05/26/21 10:26:00 CDT, 94.1, kg, Weight Dosing, 05/26/21 10:26:00 CDT  Phenergan 25 mg Tab: See Instructions, 1 tab(s) Oral q6hr prn N+V, # 30 tab(s), 0 Refill(s), Pharmacy: [facility]/pharmacy #[id number], 155, cm, Height/Length Dosing, 05/13/21 10:44:00 CDT, 97.8, kg, Weight Dosing, 05/13/21 10:44:00 CDT  atropine-diphenoxylate 0.025 mg-2.5 mg oral tablet: 1 tab(s), Oral, QID, PRN PRN as needed for loose stool, # 90 tab(s), 0 Refill(s), Pharmacy: [facility]/pharmacy #[id number], 163, cm, Height/Length  Dosing, 21 13:55:00 CDT, 92.1, kg, Weight Dosing, 21 13:55:00 CDT  olanzapine 5 mg oral tablet, disintegratin mg = 1 tab(s), Oral, Once a day (at bedtime), for nausea, # 30 tab(s), 1 Refill(s), Pharmacy: [facility]/pharmacy #[id number], 163, cm, Height/Length Dosing, 21 10:26:00 CDT, 94.1, kg, Weight Dosing, 21 10:26:00 CDT  prochlorperazine 5 mg oral tablet: 5 mg = 1 tab(s), Oral, TID, PRN PRN for nausea/vomiting, # 30 tab(s), 0 Refill(s), [id number], 163, cm, Height/Length Dosing, 21 10:26:00 CDT, 94.1, kg, Weight Dosing, 21 10:26:00 CDT  Documented Medications  Documented  ASPIRIN EC 81 MG TABLET: 81 mg = 1 tab(s), Oral, Daily  Benadryl 25 mg oral capsule: 25 mg = 1 cap(s), Oral, Once a day (at bedtime), PRN PRN for insomnia, 0 Refill(s)  Caltrate 600 + D oral tablet: 1 tab(s), Oral, 0 Refill(s)  Centrum Adults oral tablet: Oral, Daily, 0 Refill(s)  Diltiazem Hydrochloride  mg/24 hours oral capsule, extended release: 0 Refill(s)  Flonase 50 mcg/inh nasal spray: Daily, PRN PRN nasal congestion, 0 Refill(s)  Linzess 290 mcg oral capsule: 290 mcg = 1 cap(s), Oral, Daily, PRN PRN constipation, # 30 cap(s), 0 Refill(s)  Sea cantu gel: Sea cantu gel, 30 mL, Oral, Daily, 0 Refill(s)  Trelegy Ellipta 100 mcg-62.5 mcg-25 mcg/inh inhalation powder: = 1 puff(s), INH, Daily  Xanax 0.25 mg oral tablet: 0.25 mg = 1 tab(s), Oral, Daily  acetaminophen-hydrocodone 325 mg-7.5 mg oral tablet: 1 tab(s), Oral, q6hr  atorvastatin 40 mg oral tablet: 40 mg = 1 tab(s), Oral, Daily, # 30 tab(s), 0 Refill(s)  clindamycin 300 mg oral capsule: 300 mg = 1 cap(s), Oral, q6hr  docusate sodium 100 mg oral capsule: 100 mg = 1 cap(s), Oral, TID  esomeprazole 40 mg oral DR capsule: 40 mg = 1 cap(s), Oral, Daily  hydrochlorothiazide-triamterene 25 mg-37.5 mg oral tablet: 1 tab(s), Oral, Daily, # 30 tab(s), 0 Refill(s)  levothyroxine 50 mcg (0.05 mg) oral tablet: 50 mcg = 1 tab(s), Oral, Daily, # 30  tab(s), 0 Refill(s)  nitroglycerin 0.4 mg sublingual TAB:   ondansetron 4 mg oral tablet, disintegratin mg = 1 tab(s), Oral, TID  ondansetron 8 mg oral tablet: 8 mg = 1 tab(s), Oral, TID   Problem list:    All Problems  Anxiety / SNOMED CT 75766709 / Confirmed  Arthropathy / SNOMED CT 6870904064 / Confirmed  Atherosclerotic heart disease of native coronary artery without angina pectoris / SNOMED CT 054907564177742 / Confirmed  Breast cancer / SNOMED CT 221647446 / Confirmed  left  CAD - Coronary artery disease / SNOMED CT 8271611915 / Confirmed  COPD - Chronic obstructive pulmonary disease / SNOMED CT 451357081 / Confirmed  Dyslipidemia / SNOMED CT 4484962609 / Confirmed  GERD - Gastro-esophageal reflux disease / SNOMED CT 9931123834 / Confirmed  History of deep vein thrombosis / SNOMED CT 5637693754 / Confirmed  after TKR- post op  Hypertension / SNOMED CT 62889119 / Confirmed  Hypertension / SNOMED CT 14428001 / Confirmed  Hypothyroidism / SNOMED CT 85241671 / Confirmed  Hypothyroidism / SNOMED CT 60076044 / Confirmed  Encounter for echocardiogram before initiation of chemotherapy / SNOMED CT 162290649 / Confirmed,    Active Problems (14)  Anxiety   Arthropathy   Atherosclerotic heart disease of native coronary artery without angina pectoris   Breast cancer   CAD - Coronary artery disease   COPD - Chronic obstructive pulmonary disease   Dyslipidemia   Encounter for echocardiogram before initiation of chemotherapy   GERD - Gastro-esophageal reflux disease   History of deep vein thrombosis   Hypertension   Hypertension   Hypothyroidism   Hypothyroidism         Physical Examination   Vital Signs   2021 10:07 CDT       Temperature Oral          36.9 DegC                             Temperature Oral (calculated)             98.42 DegF                             Peripheral Pulse Rate     105 bpm  HI                             Respiratory Rate          18 br/min                             SpO2                       96 %                             Oxygen Therapy            Room air                             Systolic Blood Pressure   133 mmHg                             Diastolic Blood Pressure  81 mmHg                             Blood Pressure Location   Left arm                             Manual Cuff BP            No     Measurements from flowsheet : Measurements   8/4/2021 10:07 CDT       Weight Dosing             91.400 kg                             Weight Measured           91.4 kg                             Weight Measured and Calculated in Lbs     201.50 lb                             Height/Length Dosing      163.00 cm                             Height/Length Measured    163 cm                             BSA Measured              2.03 m2                             Body Mass Index Measured  34.4 kg/m2        Vital Signs (last 24 hrs)_____  Last Charted___________  Temp Oral     36.9 DegC  (AUG 04 10:07)  Heart Rate Peripheral   H 105bpm  (AUG 04 10:07)  Resp Rate         18 br/min  (AUG 04 10:07)  SBP      133 mmHg  (AUG 04 10:07)  DBP      81 mmHg  (AUG 04 10:07)  SpO2      96 %  (AUG 04 10:07)  Weight      91.4 kg  (AUG 04 10:07)  Height      163 cm  (AUG 04 10:07)  BMI      34.4  (AUG 04 10:07)     General:  Alert and oriented, No acute distress.    Eye:  Pupils are equal, round and reactive to light, Extraocular movements are intact, Normal conjunctiva.    HENT:  Normocephalic, Oral mucosa is moist.    Neck:  Supple, Non-tender, No lymphadenopathy.    Respiratory:  Lungs are clear to auscultation, Respirations are non-labored, Breath sounds are equal.    Cardiovascular:  Normal rate, Regular rhythm, No edema.    Gastrointestinal:  Soft, Non-tender, Non-distended, Normal bowel sounds.    Integumentary:  Warm, Dry, Pink, Intact.    Neurologic:  Alert, Oriented, Normal sensory, No focal deficits.    Psychiatric:  Cooperative, Appropriate mood & affect.    Breast:  deferred today.    Lymphatics:  No  lymphadenopathy neck, axilla, groin.    Musculoskeletal:  Normal gait.    Cognition and Speech:  Oriented, Speech clear and coherent.    ECOG Performance Scale: 0 - Fully active; no performance restrictions.      Review / Management   Results review:  Lab results   8/4/2021 9:48 CDT        WBC                       9.3 x10(3)/mcL                             RBC                       3.78 x10(6)/mcL  LOW                             Hgb                       10.6 gm/dL  LOW                             Hct                       33.7 %  LOW                             Platelet                  305 x10(3)/mcL                             MCV                       89.2 fL                             MCH                       28.0 pg                             MCHC                      31.5 gm/dL  LOW                             RDW                       18.5 %  HI                             MPV                       8.7 fL  LOW                             Abs Neut                  4.59 x10(3)/mcL                             NEUT%                     49.5 %  NA                             NEUT#                     4.6 x10(3)/mcL                             LYMPH%                    38.3 %  NA                             LYMPH#                    3.6 x10(3)/mcL                             MONO%                     9.5 %  NA                             MONO#                     0.9 x10(3)/mcL                             EOS%                      2.0 %  NA                             EOS#                      0.2 x10(3)/mcL                             BASO%                     0.4 %  NA                             BASO#                     0.0 x10(3)/mcL  .       Impression and Plan   Diagnoses:  1.  pT1c, N0, M0 stage IA triple positive invasive ductal carcinoma of the right breast.    Plan:  Patient has a stage IA triple positive breast cancer.  Due to HER-2 being positive, we will plan to treat her with Taxol and  Herceptin weekly for 12 weeks.  She will then complete a years worth of Herceptin.  We are planning on starting on 5/13/2021 due to her bilateral breast infection and recent breast reduction.    Echocardiogram completed on 4/14/2021 showed EF of 55-60%.     Baseline DEXA scan completed in 4/8/2021 showed mild osteopenia of the left and right femoral neck.    Patient is due for week 11 of treatment, we will push her out 1 more week to make sure she is fully recovered from COVID-19 infection and treat her on next Thursday and the following Thursday.    She will then follow-up with radiation oncology on 8/23/2021 to determine when she will start her radiation.    I will see her back in 4 weeks with a repeat echocardiogram as she is due since she is on Herceptin.  We will likely start her maintenance Herceptin at that time.    Labs: CBC, CMP    All questions were answered to the best of my ability and she understands the plan moving forward    Scout Prasad II, MD

## 2022-04-29 NOTE — PROGRESS NOTES
Patient:   Elina Paris            MRN: [id number]            FIN: [id number]               Age:   71 years     Sex:  Female     :  1950   Associated Diagnoses:   None   Author:   Yesy Koroma NP        Referring Physician:  Marifer Delgado MD         Visit Information     Problem List:   1.  pT1c, N0, M0 stage IA triple positive invasive ductal carcinoma of the right breast.    Treatment Plan:  1.  Weekly taxol and herceptin for 12 weeks followed by a year of maintenance herceptin.  Week 1 given on 2021.    2.  Adjuvant endocrine therapy   3.  Adjuvant radiation therapy to start on 2021.    Diagnosis/Treatment/HPI:   71-year-old female who underwent a screening mammogram on 2020 that showed multiple morphologically oval/circumscribed masses throughout both breasts.  This was read as BI-RADS 0, additional imaging recommended.  Diagnostic mammogram and ultrasound of the left breast on 2021 was performed and showed a 1.6 cm irregular mass at the 3 o'clock position, a 1.0 cm oval mass with obscured margins at the 3 o'clock position and a 0.6 cm irregular mass at the 2 o'clock position.  On ultrasound, the 3:00 lesion 8 cm from the nipple measured 1.6 x 1.2 x 1.2 cm.  This was irregular with angular/microlobulated margins.  There was also posterior shadowing.  The 3 o'clock position 3 cm from the nipple showed a 1.0 x 1.0 x 0.7 cm oval circumscribed mass with internal septation.  The 2 o'clock position lesion 5 cm from the nipple measured 0.6 x 0.5 x 0.7 cm, was avascular with peripheral hyper echogenicity and central hypoechogenicity and indistinct margins.  BI-RADS 4, biopsy recommended.  Patient underwent biopsy on 2021, the 3 o'clock position lesion 8 cm from the nipple revealed invasive ductal carcinoma.  The left breast 3 o'clock position 3 cm from the nipple returned as invasive ductal carcinoma.  The left breast lesion at the 2 o'clock position showed benign  "breast tissue.  Estrogen receptor was 99.74% positive, progesterone preceptor was 98.57% positive, HER-2 was 2+ by IHC and positive by FISH.  Patient then underwent bilateral breast MRI on 2/25/2021 which revealed biopsy-proven malignancy at the 3 o'clock position in the left breast spanning 5.7 cm without suspicious enhancement in either breast.  Patient then underwent a left sided lumpectomy on 3/8/2021, final pathology revealed a pT1c, N0, M0 stage IA triple positive invasive ductal carcinoma.  The patient subsequently had a bilateral breast reduction.  This was done by Dr. Manuel Mclean.  She was referred to medical oncology.  I saw the patient on 3/31/2021.  At that visit, she stated that she had bilateral breast infection.  She had no other major issues to discuss.  Baseline DEXA scan completed in 4/8/2021 showed mild osteopenia in the left and right femoral necks.  Echocardiogram completed on 4/14/2021 showed EF of 55-60%.  Taxol and herceptin started on 5/13/2021, week 12 given on 8/19/2021.  Repeat ECHO on 8/31/2021 showed EF of 55-60%.  Radiation will start on 9/2/2021.  Start maintenance Herceptin on 9/9/2021.               Chief Complaint    Follow Up      Interval History   Patient presents to clinic for scheduled follow up appointment. She is doing well overall w/o any notable complaints. Tolerating treatment w/o issue. Denies HA, SOB, CP, N/V/C/D. Denies fevers, chills or other signs of infection. Appetite and energy are stable. She is reportedly scheduled to complete planned XRT tomorrow- she is tolerating well with only some mild "soreness" to left breast noted.   CBC today reviewed and stable overall. CMP pending.     PMHx:  1.  Coronary artery disease  2.  COPD  3.  Hyperlipidemia  4.  GERD  5.  Hypertension  6.  Hypothyroidism  7.  Anxiety     PSHx:   1.  Left lumpectomy  2.  Cholecystectomy  3.  Total abdominal hysterectomy and unilateral salpingo-oophorectomy  4.  Knee surgery  5.  Sinus " surgery  6.  Tonsillectomy  8.  Cervical spinal fusion    Social Hx:   Denies tobacco, alcohol, or illicit drug use.    Family Hx:   She had a brother that had an aneurysm and a stroke, her mother and brother both had cancer, her mother father and brother had hypertension, her mother and father had heart disease, her mother had thyroid disease.         Review of Systems   12 point review of systems done in full with pertinent positives as described above  Remainder of review systems done in full and unremarkable.   Constitutional:  No fever, No chills, No sweats, No weakness.    Eye:  No double vision, No visual disturbances.    Ear/Nose/Mouth/Throat:  No nasal congestion, No sore throat.    Respiratory:  No shortness of breath, No cough.    Cardiovascular:  No chest pain.    Breast:  Both breasts, Pain.    Gastrointestinal:  No nausea, No vomiting, No diarrhea.    Genitourinary:  No dysuria, No hematuria.    Hematology/Lymphatics:  No bruising tendency, No bleeding tendency.    Musculoskeletal:  No back pain.    Integumentary:  No rash, No pruritus.    Neurologic:  No numbness, No tingling, No headache.    Psychiatric:  No anxiety.       Health Status   Allergies:    Allergic Reactions (Selected)  No Known Allergies,    Allergies (1) Active Reaction  No Known Allergies None Documented     Current medications:  (Selected)   Outpatient Medications  Future  Benadryl (for IVPB): 25 mg, form: Injection, IV Piggyback, Once-chemo, Infuse over: 20 minute(s), first dose 09/30/21 13:00:00 CDT, stop date 09/30/21 13:00:00 CDT, Future Order, Weeks 4  Benadryl (for IVPB): 25 mg, form: Injection, IV Piggyback, Once-chemo, Infuse over: 20 minute(s), first dose 10/21/21 13:00:00 CDT, stop date 10/21/21 13:00:00 CDT, Future Order, Weeks 7  Benadryl (for IVPB): 25 mg, form: Injection, IV Piggyback, Once-chemo, Infuse over: 20 minute(s), first dose 11/11/21 13:00:00 CST, stop date 11/11/21 13:00:00 CST, Future Order, Weeks  10  Heparin Flush 100 U/mL - 5 mL: 500 units, form: Injection, IV Push, Once-chemo, first dose 09/30/21 13:00:00 CDT, stop date 09/30/21 13:00:00 CDT, Routine, Weeks 4, Future Order  Heparin Flush 100 U/mL - 5 mL: 500 units, form: Injection, IV Push, Once-chemo, first dose 10/21/21 13:00:00 CDT, stop date 10/21/21 13:00:00 CDT, Routine, Weeks 7, Future Order  Heparin Flush 100 U/mL - 5 mL: 500 units, form: Injection, IV Push, Once-chemo, first dose 11/11/21 13:00:00 CST, stop date 11/11/21 13:00:00 CST, Routine, Weeks 10, Future Order  Ogivri (for IVPB): 546 mg, form: Injection, IV Piggyback, Once, Infuse over: 30 minute(s), first dose 09/30/21 13:00:00 CDT, stop date 09/30/21 13:00:00 CDT, Future Order, Weeks 4  Ogivri (for IVPB): 546 mg, form: Injection, IV Piggyback, Once, Infuse over: 30 minute(s), first dose 10/21/21 13:00:00 CDT, stop date 10/21/21 13:00:00 CDT, Future Order, Weeks 7  Ogivri (for IVPB): 546 mg, form: Injection, IV Piggyback, Once, Infuse over: 30 minute(s), first dose 11/11/21 13:00:00 CST, stop date 11/11/21 13:00:00 CST, Future Order, Weeks 10  acetaminophen: 650 mg, form: Tab, Oral, Once-chemo, first dose 09/30/21 13:00:00 CDT, stop date 09/30/21 13:00:00 CDT, Routine, Weeks 4, Future Order  acetaminophen: 650 mg, form: Tab, Oral, Once-chemo, first dose 10/21/21 13:00:00 CDT, stop date 10/21/21 13:00:00 CDT, Routine, Weeks 7, Future Order  acetaminophen: 650 mg, form: Tab, Oral, Once-chemo, first dose 11/11/21 13:00:00 CST, stop date 11/11/21 13:00:00 CST, Routine, Weeks 10, Future Order  Pending Complete  midazolam: 2 mg, form: Injection, IV Push, q5min, Order duration: 2 dose(s), first dose 03/08/21 10:30:00 CST, stop date 03/08/21 10:39:00 CST, (up to 5 mg for moderate anxiety)  Prescriptions  Prescribed  Carafate 1 g/10 mL oral suspension: 1 gm = 10 mL, Oral, BID, on an empty stomach, # 280 mL, 1 Refill(s), Pharmacy: [facility]/pharmacy #[id number], 163, cm, Height/Length Dosing, 06/10/21 9:38:00  CDT, 94.1, kg, Weight Dosing, 06/10/21 9:38:00 CDT  Lomotil 2.5 mg-0.025 mg oral tablet: 1 tab(s), Oral, QID, PRN PRN for loose stool, # 24 tab(s), 1 Refill(s), [id number], 163, cm, Height/Length Dosing, 21 10:26:00 CDT, 94.1, kg, Weight Dosing, 21 10:26:00 CDT  Phenergan 25 mg Tab: See Instructions, 1 tab(s) Oral q6hr prn N+V, # 30 tab(s), 0 Refill(s), [id number], 155, cm, Height/Length Dosing, 21 10:44:00 CDT, 97.8, kg, Weight Dosing, 21 10:44:00 CDT  codeine-guaifenesin 10 mg-100 mg/5 mL oral syrup: 5 mL, Oral, q6hr, PRN PRN for cough and congestion, # 120 mL, 0 Refill(s), Pharmacy: [facility]/pharmacy #[id number], Patient Education Provided, Patient Verbalizes Understanding, 163, cm, Height/Length Dosing, 21 10:09:00 CDT, 91.4, kg, Weight Dosing, ...  olanzapine 5 mg oral tablet, disintegratin mg = 1 tab(s), Oral, Once a day (at bedtime), for nausea, # 30 tab(s), 1 Refill(s), [id number], 163, cm, Height/Length Dosing, 21 10:26:00 CDT, 94.1, kg, Weight Dosing, 21 10:26:00 CDT  prochlorperazine 5 mg oral tablet: 5 mg = 1 tab(s), Oral, TID, PRN PRN for nausea/vomiting, # 30 tab(s), 0 Refill(s), [id number], 163, cm, Height/Length Dosing, 21 10:26:00 CDT, 94.1, kg, Weight Dosing, 21 10:26:00 CDT  Documented Medications  Documented  ASPIRIN EC 81 MG TABLET: 81 mg = 1 tab(s), Oral, Daily  Advil: Oral, q6hr, 0 Refill(s)  Benadryl 25 mg oral capsule: 25 mg = 1 cap(s), Oral, Once a day (at bedtime), PRN PRN for insomnia, 0 Refill(s)  Caltrate 600 + D oral tablet: 1 tab(s), Oral, 0 Refill(s)  Centrum Adults oral tablet: Oral, Daily, 0 Refill(s)  Diltiazem Hydrochloride  mg/24 hours oral capsule, extended release: 0 Refill(s)  Flonase 50 mcg/inh nasal spray: Daily, PRN PRN nasal congestion, 0 Refill(s)  Linzess 290 mcg oral capsule: 290 mcg = 1 cap(s), Oral, Daily, PRN PRN constipation, # 30  cap(s), 0 Refill(s)  Sea cantu gel: Sea cantu gel, 30 mL, Oral, Daily, 0 Refill(s)  Trelegy Ellipta 100 mcg-62.5 mcg-25 mcg/inh inhalation powder: = 1 puff(s), INH, Daily  Xanax 0.25 mg oral tablet: 0.25 mg = 1 tab(s), Oral, Daily  acetaminophen-hydrocodone 325 mg-7.5 mg oral tablet: 1 tab(s), Oral, q6hr  atorvastatin 40 mg oral tablet: 40 mg = 1 tab(s), Oral, Daily, # 30 tab(s), 0 Refill(s)  clindamycin 300 mg oral capsule: 300 mg = 1 cap(s), Oral, q6hr  docusate sodium 100 mg oral capsule: 100 mg = 1 cap(s), Oral, TID  esomeprazole 40 mg oral DR capsule: 40 mg = 1 cap(s), Oral, Daily  hydrochlorothiazide-triamterene 25 mg-37.5 mg oral tablet: 1 tab(s), Oral, Daily, # 30 tab(s), 0 Refill(s)  levothyroxine 50 mcg (0.05 mg) oral tablet: 50 mcg = 1 tab(s), Oral, Daily, # 30 tab(s), 0 Refill(s)  nitroglycerin 0.4 mg sublingual TAB:   ondansetron 4 mg oral tablet, disintegratin mg = 1 tab(s), Oral, TID  ondansetron 8 mg oral tablet: 8 mg = 1 tab(s), Oral, TID   Problem list:    All Problems  Anxiety / SNOMED CT 37675588 / Confirmed  Arthropathy / SNOMED CT 8290213263 / Confirmed  Atherosclerotic heart disease of native coronary artery without angina pectoris / SNOMED CT 924951974163859 / Confirmed  Breast cancer / SNOMED CT 414333802 / Confirmed  left  CAD - Coronary artery disease / SNOMED CT 6035191096 / Confirmed  COPD - Chronic obstructive pulmonary disease / SNOMED CT 002309271 / Confirmed  Dyslipidemia / SNOMED CT 4802511181 / Confirmed  GERD - Gastro-esophageal reflux disease / SNOMED CT 5420444132 / Confirmed  History of deep vein thrombosis / SNOMED CT 0951630179 / Confirmed  after TKR- post op  Hypertension / SNOMED CT 99154648 / Confirmed  Hypertension / SNOMED CT 01768283 / Confirmed  Hypothyroidism / SNOMED CT 11136295 / Confirmed  Hypothyroidism / SNOMED CT 10642606 / Confirmed  Encounter for echocardiogram before initiation of chemotherapy / SNOMED CT 654132602 / Confirmed,    Active Problems  (14)  Anxiety   Arthropathy   Atherosclerotic heart disease of native coronary artery without angina pectoris   Breast cancer   CAD - Coronary artery disease   COPD - Chronic obstructive pulmonary disease   Dyslipidemia   Encounter for echocardiogram before initiation of chemotherapy   GERD - Gastro-esophageal reflux disease   History of deep vein thrombosis   Hypertension   Hypertension   Hypothyroidism   Hypothyroidism         Physical Examination   Vital Signs   9/29/2021 13:46 CDT      Temperature Oral          37.2 DegC                             Temperature Oral (calculated)             98.96 DegF                             Peripheral Pulse Rate     80 bpm                             Respiratory Rate          18 br/min                             SpO2                      98 %                             Oxygen Therapy            Room air                             Systolic Blood Pressure   130 mmHg                             Diastolic Blood Pressure  80 mmHg                             Blood Pressure Location   Right arm                             Manual Cuff BP            No                             O2 SAT at rest            98 %     Measurements from flowsheet : Measurements   9/29/2021 13:46 CDT      Weight Dosing             91.400 kg                             Weight Measured           91.4 kg                             Weight Measured and Calculated in Lbs     201.50 lb                             Height/Length Dosing      163.00 cm                             Height/Length Measured    163 cm                             BSA Measured              2.03 m2                             Body Mass Index Measured  34.4 kg/m2        No qualifying data available   General:  Alert and oriented, No acute distress.    Eye:  Pupils are equal, round and reactive to light, Extraocular movements are intact, Normal conjunctiva.    HENT:  Normocephalic, Oral mucosa is moist.    Neck:  Supple, Non-tender, No  lymphadenopathy.    Respiratory:  Lungs are clear to auscultation, Respirations are non-labored, Breath sounds are equal.    Cardiovascular:  Normal rate, Regular rhythm, No edema.    Gastrointestinal:  Soft, Non-tender, Non-distended, Normal bowel sounds.    Integumentary:  Warm, Dry, Pink, Intact.    Neurologic:  Alert, Oriented, Normal sensory, No focal deficits.    Psychiatric:  Cooperative, Appropriate mood & affect.    Breast:  left breast with grade 1 radiation burn - mild tenderness, no notabl skin breakdown.    Lymphatics:  No lymphadenopathy neck, axilla, groin.    Musculoskeletal:  Normal gait.    Cognition and Speech:  Oriented, Speech clear and coherent.    ECOG Performance Scale: 0 - Fully active; no performance restrictions.      Review / Management   Results review:  Lab results   9/29/2021 13:17 CDT      WBC                       7.4 x10(3)/mcL                             RBC                       4.18 x10(6)/mcL  LOW                             Hgb                       11.7 gm/dL  LOW                             Hct                       37.7 %                             Platelet                  213 x10(3)/mcL                             MCV                       90.2 fL                             MCH                       28.0 pg                             MCHC                      31.0 gm/dL  LOW                             RDW                       15.7 %                             MPV                       9.6 fL                             Abs Neut                  5.09 x10(3)/mcL                             NEUT%                     68.9 %  NA                             NEUT#                     5.1 x10(3)/mcL                             LYMPH%                    17.8 %  NA                             LYMPH#                    1.3 x10(3)/mcL                             MONO%                     10.2 %  NA                             MONO#                     0.8 x10(3)/mcL                              EOS%                      2.7 %  NA                             EOS#                      0.2 x10(3)/mcL                             BASO%                     0.3 %  NA                             BASO#                     0.0 x10(3)/mcL  .       Impression and Plan   Diagnoses:  1.  pT1c, N0, M0 stage IA triple positive invasive ductal carcinoma of the right breast.    Plan:  Patient has a stage IA triple positive breast cancer.  Due to HER-2 being positive, we will plan to treat her with Taxol and Herceptin weekly for 12 weeks.  She will then complete a years worth of Herceptin.  We are planning on starting on 5/13/2021 due to her bilateral breast infection and recent breast reduction.    Echocardiogram completed on 4/14/2021 showed EF of 55-60%. Repeat ECHO on 8/31/2021 with EF 55-60%.    Baseline DEXA scan completed in 4/8/2021 showed mild osteopenia of the left and right femoral neck.    She was seen by Dr. Renee on 8/23/2021 --- she is scheduled to complete planned radiation tomorrow per her report. She is tolerating well overall    Started maintenance Herceptin on 9/9/2021.  She will complete 1 year worth- OK to proceed with next cycle tomorrow as planned    Return to clinic in 3 weeks  for TD visit and clinical examination    Labs: CBC, CMP    All questions were answered to the best of my ability and she understands the plan moving forward    Yesy URIOSTEGUI, MIRLANDEP-C

## 2022-04-29 NOTE — PROGRESS NOTES
Patient:   Elina Paris            MRN: [id number]            FIN: [id number]               Age:   71 years     Sex:  Female     :  1950   Associated Diagnoses:   None   Author:   Osmar CASEY MD, Philippe E        Referring Physician:  Marifer Delgado MD         Visit Information     Problem List:   1.  pT1c, N0, M0 stage IA triple positive invasive ductal carcinoma of the right breast.    Treatment Plan:  1.  Weekly taxol and herceptin for 12 weeks followed by a year of maintenance herceptin.  Week 1 given on 2021, week 12 given on 2021.  Maintenance Herceptin started on 2021  2.  Adjuvant endocrine therapy   3.  Adjuvant radiation therapy to start on 2021, completed 2021.    Diagnosis/Treatment/HPI:   71-year-old female who underwent a screening mammogram on 2020 that showed multiple morphologically oval/circumscribed masses throughout both breasts.  This was read as BI-RADS 0, additional imaging recommended.  Diagnostic mammogram and ultrasound of the left breast on 2021 was performed and showed a 1.6 cm irregular mass at the 3 o'clock position, a 1.0 cm oval mass with obscured margins at the 3 o'clock position and a 0.6 cm irregular mass at the 2 o'clock position.  On ultrasound, the 3:00 lesion 8 cm from the nipple measured 1.6 x 1.2 x 1.2 cm.  This was irregular with angular/microlobulated margins.  There was also posterior shadowing.  The 3 o'clock position 3 cm from the nipple showed a 1.0 x 1.0 x 0.7 cm oval circumscribed mass with internal septation.  The 2 o'clock position lesion 5 cm from the nipple measured 0.6 x 0.5 x 0.7 cm, was avascular with peripheral hyper echogenicity and central hypoechogenicity and indistinct margins.  BI-RADS 4, biopsy recommended.  Patient underwent biopsy on 2021, the 3 o'clock position lesion 8 cm from the nipple revealed invasive ductal carcinoma.  The left breast 3 o'clock position 3 cm from the nipple returned as  invasive ductal carcinoma.  The left breast lesion at the 2 o'clock position showed benign breast tissue.  Estrogen receptor was 99.74% positive, progesterone preceptor was 98.57% positive, HER-2 was 2+ by IHC and positive by FISH.  Patient then underwent bilateral breast MRI on 2/25/2021 which revealed biopsy-proven malignancy at the 3 o'clock position in the left breast spanning 5.7 cm without suspicious enhancement in either breast.  Patient then underwent a left sided lumpectomy on 3/8/2021, final pathology revealed a pT1c, N0, M0 stage IA triple positive invasive ductal carcinoma.  The patient subsequently had a bilateral breast reduction.  This was done by Dr. Manuel Mclean.  She was referred to medical oncology.  I saw the patient on 3/31/2021.  At that visit, she stated that she had bilateral breast infection.  She had no other major issues to discuss.  Baseline DEXA scan completed in 4/8/2021 showed mild osteopenia in the left and right femoral necks.  Echocardiogram completed on 4/14/2021 showed EF of 55-60%.  Taxol and herceptin started on 5/13/2021, week 12 given on 8/19/2021.  Repeat ECHO on 8/31/2021 showed EF of 55-60%.  Radiation started on 9/2/2021.  Started maintenance Herceptin on 9/9/2021.  Completed radiation on 9/30/2021.  Repeat ECHO done on 11/29/2021 showed EF of 60 to 65%.         Chief Complaint    Follow Up      Interval History   Patient presents to clinic for scheduled follow up appointment. She is doing well overall w/o any notable complaints aside from continued redness/tenderness of left breast following radiation therapy.  She was seen by surgery since her last visit, nothing new from surgical standpoint.  She is tolerating Herceptin well.  She is ready to get started on anastrozole.        PMHx:  1.  Coronary artery disease  2.  COPD  3.  Hyperlipidemia  4.  GERD  5.  Hypertension  6.  Hypothyroidism  7.  Anxiety     PSHx:   1.  Left lumpectomy  2.  Cholecystectomy  3.  Total  abdominal hysterectomy and unilateral salpingo-oophorectomy  4.  Knee surgery  5.  Sinus surgery  6.  Tonsillectomy  8.  Cervical spinal fusion    Social Hx:   Denies tobacco, alcohol, or illicit drug use.    Family Hx:   She had a brother that had an aneurysm and a stroke, her mother and brother both had cancer, her mother father and brother had hypertension, her mother and father had heart disease, her mother had thyroid disease.         Review of Systems   12 point review of systems done in full with pertinent positives as described above  Remainder of review systems done in full and unremarkable.   Constitutional:  No fever, No chills, No sweats, No weakness.    Eye:  No double vision, No visual disturbances.    Ear/Nose/Mouth/Throat:  No nasal congestion, No sore throat.    Respiratory:  No shortness of breath, No cough.    Cardiovascular:  No chest pain.    Breast:  Both breasts, Pain.    Gastrointestinal:  No nausea, No vomiting, No diarrhea.    Genitourinary:  No dysuria, No hematuria.    Hematology/Lymphatics:  No bruising tendency, No bleeding tendency.    Musculoskeletal:  No back pain.    Integumentary:  No rash, No pruritus.    Neurologic:  No numbness, No tingling, No headache.    Psychiatric:  No anxiety.       Health Status   Allergies:    Allergic Reactions (Selected)  No Known Allergies,    Allergies (1) Active Reaction  No Known Allergies None Documented     Current medications:  (Selected)   Outpatient Medications  Ordered  acetaminophen: 650 mg, form: Tab, Oral, Once-chemo, first dose 09/30/21 13:00:00 CDT, stop date 09/30/21 13:00:00 CDT, Routine, Weeks 4  Pending Complete  midazolam: 2 mg, form: Injection, IV Push, q5min, Order duration: 2 dose(s), first dose 03/08/21 10:30:00 CST, stop date 03/08/21 10:39:00 CST, (up to 5 mg for moderate anxiety)  Prescriptions  Prescribed  Carafate 1 g/10 mL oral suspension: 1 gm = 10 mL, Oral, BID, on an empty stomach, # 280 mL, 1 Refill(s), Pharmacy:  [facility]/pharmacy #[id number], 163, cm, Height/Length Dosing, 06/10/21 9:38:00 CDT, 94.1, kg, Weight Dosing, 06/10/21 9:38:00 CDT  Lomotil 2.5 mg-0.025 mg oral tablet: 1 tab(s), Oral, QID, PRN PRN for loose stool, # 24 tab(s), 1 Refill(s), [id number], 163, cm, Height/Length Dosing, 21 10:26:00 CDT, 94.1, kg, Weight Dosing, 21 10:26:00 CDT  Phenergan 25 mg Tab: See Instructions, 1 tab(s) Oral q6hr prn N+V, # 30 tab(s), 0 Refill(s), [id number], 155, cm, Height/Length Dosing, 21 10:44:00 CDT, 97.8, kg, Weight Dosing, 21 10:44:00 CDT  codeine-guaifenesin 10 mg-100 mg/5 mL oral syrup: 5 mL, Oral, q6hr, PRN PRN for cough and congestion, # 120 mL, 0 Refill(s), [id number], Patient Education Provided, Patient Verbalizes Understanding, 163, cm, Height/Length Dosing, 21 10:09:00 CDT, 91.4, kg, Weight Dosing, ...  olanzapine 5 mg oral tablet, disintegratin mg = 1 tab(s), Oral, Once a day (at bedtime), for nausea, # 30 tab(s), 1 Refill(s), Pharmacy: [facility]/pharmacy #[id number], 163, cm, Height/Length Dosing, 21 10:26:00 CDT, 94.1, kg, Weight Dosing, 21 10:26:00 CDT  prochlorperazine 5 mg oral tablet: 5 mg = 1 tab(s), Oral, TID, PRN PRN for nausea/vomiting, # 30 tab(s), 0 Refill(s), [id number], 163, cm, Height/Length Dosing, 21 10:26:00 CDT, 94.1, kg, Weight Dosing, 21 10:26:00 CDT  Documented Medications  Documented  ASPIRIN EC 81 MG TABLET: 81 mg = 1 tab(s), Oral, Daily  Advil: Oral, q6hr, 0 Refill(s)  Benadryl 25 mg oral capsule: 25 mg = 1 cap(s), Oral, Once a day (at bedtime), PRN PRN for insomnia, 0 Refill(s)  Caltrate 600 + D oral tablet: 1 tab(s), Oral, 0 Refill(s)  Centrum Adults oral tablet: Oral, Daily, 0 Refill(s)  Diltiazem Hydrochloride  mg/24 hours oral capsule, extended release: 0 Refill(s)  Flonase 50 mcg/inh nasal spray: Daily, PRN PRN nasal congestion, 0 Refill(s)  Linzess 290 mcg oral  capsule: 290 mcg = 1 cap(s), Oral, Daily, PRN PRN constipation, # 30 cap(s), 0 Refill(s)  Sea cantu gel: Sea cantu gel, 30 mL, Oral, Daily, 0 Refill(s)  Trelegy Ellipta 100 mcg-62.5 mcg-25 mcg/inh inhalation powder: = 1 puff(s), INH, Daily  Xanax 0.25 mg oral tablet: 0.25 mg = 1 tab(s), Oral, Daily  acetaminophen-hydrocodone 325 mg-7.5 mg oral tablet: 1 tab(s), Oral, q6hr  atorvastatin 40 mg oral tablet: 40 mg = 1 tab(s), Oral, Daily, # 30 tab(s), 0 Refill(s)  clindamycin 300 mg oral capsule: 300 mg = 1 cap(s), Oral, q6hr  docusate sodium 100 mg oral capsule: 100 mg = 1 cap(s), Oral, TID  esomeprazole 40 mg oral DR capsule: 40 mg = 1 cap(s), Oral, Daily  hydrochlorothiazide-triamterene 25 mg-37.5 mg oral tablet: 1 tab(s), Oral, Daily, # 30 tab(s), 0 Refill(s)  levothyroxine 50 mcg (0.05 mg) oral tablet: 50 mcg = 1 tab(s), Oral, Daily, # 30 tab(s), 0 Refill(s)  nitroglycerin 0.4 mg sublingual TAB:   ondansetron 4 mg oral tablet, disintegratin mg = 1 tab(s), Oral, TID  ondansetron 8 mg oral tablet: 8 mg = 1 tab(s), Oral, TID   Problem list:    All Problems  Hypothyroidism / 24917973 / Confirmed  Hypertension / 62362634 / Confirmed  Hypertension / 40453490 / Confirmed  Hypothyroidism / 70343724 / Confirmed  COPD - Chronic obstructive pulmonary disease / 259069445 / Confirmed  Arthropathy / 9060341460 / Confirmed  Atherosclerotic heart disease of native coronary artery without angina pectoris / 486281155410665 / Confirmed  CAD - Coronary artery disease / 0892222152 / Confirmed  Breast cancer / 126782136 / Confirmed  GERD - Gastro-esophageal reflux disease / 9892827274 / Confirmed  Dyslipidemia / 4766809192 / Confirmed  History of deep vein thrombosis / 6578959944 / Confirmed  Anxiety / 04450083 / Confirmed  Encounter for echocardiogram before initiation of chemotherapy / 436871887 / Confirmed,    Active Problems (14)  Anxiety   Arthropathy   Atherosclerotic heart disease of native coronary artery without angina  pectoris   Breast cancer   CAD - Coronary artery disease   COPD - Chronic obstructive pulmonary disease   Dyslipidemia   Encounter for echocardiogram before initiation of chemotherapy   GERD - Gastro-esophageal reflux disease   History of deep vein thrombosis   Hypertension   Hypertension   Hypothyroidism   Hypothyroidism         Histories   Past Medical History:    Active  Hypertension (70826623)  Hypothyroidism (66829011)   Family History:    Cancer  Mother  Brother  Aneurysm  Brother  Thyroid  Mother  Heart disease......  Mother  Father  CVA (cerebrovascular accident).......  Brother  HTN (hypertension)......  Father  Mother  Brother     Procedure history:    Catheter Insertion Mediport (Right) performed by Marifer Delgado MD on 3/8/2021 at 71 Years.  Comments:  3/8/2021 16:48 Oralia Edward RN  auto-populated from documented surgical case  Breast Reduction (Bilateral) performed by Manuel Mclean MD on 3/8/2021 at 71 Years.  Comments:  3/8/2021 16:48 Oralia Edward RN  auto-populated from documented surgical case  Partial Mastectomy W/Rhododendron Node Bx (Left) performed by Marifer Delgado MD on 3/8/2021 at 71 Years.  Comments:  3/8/2021 16:48 Oralia Edward RN  auto-populated from documented surgical case  Colonoscopy performed by Adria Sanderson MD on 7/6/2016 at 66 Years.  Comments:  7/6/2016 12:53 CDT - Dino Pack RN.  auto-populated from documented surgical case  Cervical spinal fusion (SNOMED CT 338810159) in 2001 at 51 Years.  Cervical spinal fusion (SNOMED CT 698035488) in 1995 at 45 Years.  Partial hysterectomy (SNOMED CT 4179188763) in 1985 at 35 Years.  Cholecystectomy; (CPT4 61675).  Tonsillectomy, primary or secondary; younger than age 12 (CPT4 72301).  knee surgery.  sinus surgery.   Social History        Social & Psychosocial Habits    Alcohol  07/12/2015 Risk Assessment: Denies Alcohol Use    06/19/2018  Use: Never    Employment/School  03/08/2021   Status: Retired    Highest education: None    Substance Use  07/12/2015 Risk Assessment: Denies Substance Abuse    03/04/2021  Use: Never    Tobacco  07/12/2015 Risk Assessment: Denies Tobacco Use    11/11/2021  Use: Former smoker, quit more    Patient Wants Consult For Cessation Counseling N/A    Comment: Quit 8 years ago. - 06/17/2021 10:35 - Garfield BLAIR, Lorena TREJO    Abuse/Neglect  11/11/2021  SHX Any signs of abuse or neglect No    Feels unsafe at home: No    Safe place to go: Yes    Spiritual/Cultural  03/08/2021  [employer]  .        Physical Examination      No qualifying data available   General:  Alert and oriented, No acute distress.    Eye:  Pupils are equal, round and reactive to light, Extraocular movements are intact, Normal conjunctiva.    HENT:  Normocephalic, Oral mucosa is moist.    Neck:  Supple, Non-tender, No lymphadenopathy.    Respiratory:  Lungs are clear to auscultation, Respirations are non-labored, Breath sounds are equal.    Cardiovascular:  Normal rate, Regular rhythm, No edema.    Gastrointestinal:  Soft, Non-tender, Non-distended, Normal bowel sounds.    Integumentary:  Warm, Dry, Pink, Intact.    Neurologic:  Alert, Oriented, Normal sensory, No focal deficits.    Psychiatric:  Cooperative, Appropriate mood & affect.    Breast:  left breast with redness and tenderness .    Lymphatics:  No lymphadenopathy neck, axilla, groin.    Musculoskeletal:  Normal gait.    Cognition and Speech:  Oriented, Speech clear and coherent.    ECOG Performance Scale: 0 - Fully active; no performance restrictions.      Review / Management   Results review      Impression and Plan   Diagnoses:  1.  pT1c, N0, M0 stage IA triple positive invasive ductal carcinoma of the right breast.    Plan:  Patient has a stage IA triple positive breast cancer.  Due to HER-2 being positive, we treated with Taxol and Herceptin weekly for 12 weeks, completed on 8/19/2021.  She will then complete a years  worth of Herceptin, started on 9/9/2021.  Radiation started on 9/2/2021, finished on 9/30/2021.      Echocardiogram completed on 4/14/2021 showed EF of 55-60%. Repeat ECHO on 8/31/2021 with EF 55-60%.  Repeat ECHO done on 11/29/2021    Baseline DEXA scan completed in 4/8/2021 showed mild osteopenia of the left and right femoral neck.    She was seen by Dr. Renee on 8/23/2021; completed radiation on 9/30/2021.    Started maintenance Herceptin on 9/9/2021.  She will complete 1 year worth- OK to proceed with next cycle tomorrow as planned.  We will start endocrine therapy today.    Return to clinic in 3 weeks  for TD visit and clinical examination    Labs: CBC, CMP      All questions were answered to the best of my ability.   Scout Prasad II, MD         Professional Services   I, Jeniffer Giraldo LPN, acted solely as a scribe for and in the presence of, Dr. Scout Prasad who performed the service.

## 2022-04-29 NOTE — PROGRESS NOTES
Patient:   Elina Paris            MRN: [id number]            FIN: [id number]               Age:   71 years     Sex:  Female     :  1950   Associated Diagnoses:   None   Author:   Yesy Koroma NP        Referring Physician:  Marifer Delgado MD         Visit Information     Problem List:   1.  pT1c, N0, M0 stage IA triple positive invasive ductal carcinoma of the right breast.    Treatment Plan:  1.  Weekly taxol and herceptin for 12 weeks followed by a year of maintenance herceptin.  Week 1 given on 2021.    2.  Adjuvant endocrine therapy   3.  Adjuvant radiation therapy.    Diagnosis/Treatment/HPI:   71-year-old female who underwent a screening mammogram on 2020 that showed multiple morphologically oval/circumscribed masses throughout both breasts.  This was read as BI-RADS 0, additional imaging recommended.  Diagnostic mammogram and ultrasound of the left breast on 2021 was performed and showed a 1.6 cm irregular mass at the 3 o'clock position, a 1.0 cm oval mass with obscured margins at the 3 o'clock position and a 0.6 cm irregular mass at the 2 o'clock position.  On ultrasound, the 3:00 lesion 8 cm from the nipple measured 1.6 x 1.2 x 1.2 cm.  This was irregular with angular/microlobulated margins.  There was also posterior shadowing.  The 3 o'clock position 3 cm from the nipple showed a 1.0 x 1.0 x 0.7 cm oval circumscribed mass with internal septation.  The 2 o'clock position lesion 5 cm from the nipple measured 0.6 x 0.5 x 0.7 cm, was avascular with peripheral hyper echogenicity and central hypoechogenicity and indistinct margins.  BI-RADS 4, biopsy recommended.  Patient underwent biopsy on 2021, the 3 o'clock position lesion 8 cm from the nipple revealed invasive ductal carcinoma.  The left breast 3 o'clock position 3 cm from the nipple returned as invasive ductal carcinoma.  The left breast lesion at the 2 o'clock position showed benign breast tissue.   Estrogen receptor was 99.74% positive, progesterone preceptor was 98.57% positive, HER-2 was 2+ by IHC and positive by FISH.  Patient then underwent bilateral breast MRI on 2/25/2021 which revealed biopsy-proven malignancy at the 3 o'clock position in the left breast spanning 5.7 cm without suspicious enhancement in either breast.  Patient then underwent a left sided lumpectomy on 3/8/2021, final pathology revealed a pT1c, N0, M0 stage IA triple positive invasive ductal carcinoma.  The patient subsequently had a bilateral breast reduction.  This was done by Dr. Manuel Mclean.  She was referred to medical oncology.  I saw the patient on 3/31/2021.  At that visit, she stated that she had bilateral breast infection.  She had no other major issues to discuss.  Baseline DEXA scan completed in 4/8/2021 showed mild osteopenia in the left and right femoral necks.  Echocardiogram completed on 4/14/2021 showed EF of 55-60%.  Taxol and herceptin started on 5/13/2021.           Chief Complaint    Follow Up      Interval History   Patient presents to clinic for scheduled follow up appointment. She reports improvement in diarrhea following initiation of Lomotil PRN. However she is continuing to struggle with heartburn and reflux despite daily use of PPI. She has modified her diet and is still struggling to maintain adequate PO intake. Denies HA, SOB, CP. She is managing nausea with PRN antiemetics. Denies fevers, chills or other signs of infection. CBC today reviewed and stable overall. She continues to have some soreness to bilateral breasts but this is stable/improved. No other questions noted.       PMHx:  1.  Coronary artery disease  2.  COPD  3.  Hyperlipidemia  4.  GERD  5.  Hypertension  6.  Hypothyroidism  7.  Anxiety     PSHx:   1.  Left lumpectomy  2.  Cholecystectomy  3.  Total abdominal hysterectomy and unilateral salpingo-oophorectomy  4.  Knee surgery  5.  Sinus surgery  6.  Tonsillectomy  8.  Cervical spinal  fusion    Social Hx:   Denies tobacco, alcohol, or illicit drug use.    Family Hx:   She had a brother that had an aneurysm and a stroke, her mother and brother both had cancer, her mother father and brother had hypertension, her mother and father had heart disease, her mother had thyroid disease.         Review of Systems   12 point review of systems done in full with pertinent positives as described above  Remainder of review systems done in full and unremarkable.   Constitutional:  No fever, No chills, No sweats, No weakness.    Eye:  No double vision, No visual disturbances.    Ear/Nose/Mouth/Throat:  No nasal congestion, No sore throat.    Respiratory:  No shortness of breath, No cough.    Cardiovascular:  No chest pain.    Breast:  Both breasts, Pain.    Gastrointestinal:  No nausea, No vomiting, No diarrhea.    Genitourinary:  No dysuria, No hematuria.    Hematology/Lymphatics:  No bruising tendency, No bleeding tendency.    Musculoskeletal:  No back pain.    Integumentary:  No rash, No pruritus.    Neurologic:  No numbness, No tingling, No headache.    Psychiatric:  No anxiety.       Health Status   Allergies:    Allergic Reactions (Selected)  No Known Allergies,    Allergies (1) Active Reaction  No Known Allergies None Documented     Current medications:  (Selected)   Outpatient Medications  Future  Aloxi (for IVPB): 0.25 mg, form: Injection, IV Piggyback, Once-chemo, Infuse over: 20 minute(s), *Est. first dose 06/17/21 8:40:00 CDT, *Est. stop date 06/17/21 8:40:00 CDT, Future Order, Weeks 3  Benadryl 50mg/ml Inj: 25 mg, form: Injection, IV Piggyback, Once-chemo, Infuse over: 20 minute(s), *Est. first dose 06/17/21 8:40:00 CDT, *Est. stop date 06/17/21 8:40:00 CDT, Routine, Weeks 3, Future Order  Emend (for IVPB): 150 mg, form: Powder-Inj, IV Piggyback, Once-chemo, Infuse over: 30 minute(s), first dose 06/17/21 8:00:00 CDT, stop date 06/17/21 8:00:00 CDT, Future Order, Weeks 3  Heparin Flush 100 U/mL - 5  mL: 500 units, form: Injection, IV Push, Once-chemo, *Est. first dose 21 10:30:00 CDT, *Est. stop date 21 10:30:00 CDT, Routine, Weeks 3, Future Order  Herceptin (for IVPB): 189 mg, form: Injection, IV Piggyback, Once-chemo, Infuse over: 30 minute(s), first dose 21 8:00:00 CDT, stop date 21 8:00:00 CDT, Future Order, Weeks 3  Pepcid (for IVPB): 20 mg, form: Injection, IV Piggyback, Once-chemo, Infuse over: 20 minute(s), *Est. first dose 21 8:40:00 CDT, *Est. stop date 21 8:40:00 CDT, Future Order, Weeks 3  Taxol (for IVPB): 159 mg, form: Injection, IV Piggyback, Once-chemo, Infuse over: 1 hr, *Est. first dose 21 9:00:00 CDT, *Est. stop date 21 9:00:00 CDT, Future Order, Weeks 3  acetaminophen: 650 mg, form: Tab, Oral, Once-chemo, *Est. first dose 21 8:40:00 CDT, *Est. stop date 21 8:40:00 CDT, Routine, Weeks 3, Future Order  dexamethasone (for IVPB): 10 mg, form: Soln, IV Piggyback, Once-chemo, Infuse over: 20 minute(s), *Est. first dose 21 8:40:00 CDT, *Est. stop date 21 8:40:00 CDT, Future Order, Weeks 3  Pending Complete  midazolam: 2 mg, form: Injection, IV Push, q5min, Order duration: 2 dose(s), first dose 21 10:30:00 CST, stop date 21 10:39:00 CST, (up to 5 mg for moderate anxiety)  Prescriptions  Prescribed  Lomotil 2.5 mg-0.025 mg oral tablet: 1 tab(s), Oral, QID, PRN PRN for loose stool, # 24 tab(s), 1 Refill(s), Pharmacy: [facility]/pharmacy #[id number], 163, cm, Height/Length Dosing, 21 10:26:00 CDT, 94.1, kg, Weight Dosing, 21 10:26:00 CDT  Phenergan 25 mg Tab: See Instructions, 1 tab(s) Oral q6hr prn N+V, # 30 tab(s), 0 Refill(s), Pharmacy: [facility]/pharmacy #[id number], 155, cm, Height/Length Dosing, 21 10:44:00 CDT, 97.8, kg, Weight Dosing, 21 10:44:00 CDT  olanzapine 5 mg oral tablet, disintegratin mg = 1 tab(s), Oral, Once a day (at bedtime), for nausea, # 30 tab(s), 1 Refill(s), Pharmacy: [facility]/pharmacy #[id number],  163, cm, Height/Length Dosing, 05/26/21 10:26:00 CDT, 94.1, kg, Weight Dosing, 05/26/21 10:26:00 CDT  prochlorperazine 5 mg oral tablet: 5 mg = 1 tab(s), Oral, TID, PRN PRN for nausea/vomiting, # 30 tab(s), 0 Refill(s), Pharmacy: [facility]/pharmacy #[id number], 163, cm, Height/Length Dosing, 05/26/21 10:26:00 CDT, 94.1, kg, Weight Dosing, 05/26/21 10:26:00 CDT  Documented Medications  Documented  ASPIRIN EC 81 MG TABLET: 81 mg = 1 tab(s), Oral, Daily  Benadryl 25 mg oral capsule: 25 mg = 1 cap(s), Oral, Once a day (at bedtime), PRN PRN for insomnia, 0 Refill(s)  Caltrate 600 + D oral tablet: 1 tab(s), Oral, 0 Refill(s)  Centrum Adults oral tablet: Oral, Daily, 0 Refill(s)  Diltiazem Hydrochloride  mg/24 hours oral capsule, extended release: 0 Refill(s)  Flonase 50 mcg/inh nasal spray: Daily, PRN PRN nasal congestion, 0 Refill(s)  Linzess 290 mcg oral capsule: 290 mcg = 1 cap(s), Oral, Daily, PRN PRN constipation, # 30 cap(s), 0 Refill(s)  Sea cantu gel: Sea cantu gel, 30 mL, Oral, Daily, 0 Refill(s)  Trelegy Ellipta 100 mcg-62.5 mcg-25 mcg/inh inhalation powder: = 1 puff(s), INH, Daily  Xanax 0.25 mg oral tablet: 0.25 mg = 1 tab(s), Oral, Daily  atorvastatin 40 mg oral tablet: 40 mg = 1 tab(s), Oral, Daily, # 30 tab(s), 0 Refill(s)  atropine-diphenoxylate 0.025 mg-2.5 mg oral tablet: 2 tab(s), Oral, QID  esomeprazole 40 mg oral DR capsule: 40 mg = 1 cap(s), Oral, Daily  hydrochlorothiazide-triamterene 25 mg-37.5 mg oral tablet: 1 tab(s), Oral, Daily, # 30 tab(s), 0 Refill(s)  levothyroxine 50 mcg (0.05 mg) oral tablet: 50 mcg = 1 tab(s), Oral, Daily, # 30 tab(s), 0 Refill(s)  nitroglycerin 0.4 mg sublingual TAB:   ondansetron 8 mg oral tablet: 8 mg = 1 tab(s), Oral, TID   Problem list:    All Problems  Anxiety / SNOMED CT 26794773 / Confirmed  Arthropathy / SNOMED CT 1061501043 / Confirmed  Atherosclerotic heart disease of native coronary artery without angina pectoris / SNOMED CT 203647152829386 / Confirmed  Breast cancer  / SNOMED CT 059023431 / Confirmed  left  CAD - Coronary artery disease / SNOMED CT 7091383530 / Confirmed  COPD - Chronic obstructive pulmonary disease / SNOMED CT 270879697 / Confirmed  Dyslipidemia / SNOMED CT 2524040458 / Confirmed  GERD - Gastro-esophageal reflux disease / SNOMED CT 5722746986 / Confirmed  History of deep vein thrombosis / SNOMED CT 8364232415 / Confirmed  after TKR- post op  Hypertension / SNOMED CT 42451256 / Confirmed  Hypertension / SNOMED CT 11074070 / Confirmed  Hypothyroidism / SNOMED CT 61447532 / Confirmed  Hypothyroidism / SNOMED CT 02015869 / Confirmed  Encounter for echocardiogram before initiation of chemotherapy / SNOMED CT 192600034 / Confirmed,    Active Problems (14)  Anxiety   Arthropathy   Atherosclerotic heart disease of native coronary artery without angina pectoris   Breast cancer   CAD - Coronary artery disease   COPD - Chronic obstructive pulmonary disease   Dyslipidemia   Encounter for echocardiogram before initiation of chemotherapy   GERD - Gastro-esophageal reflux disease   History of deep vein thrombosis   Hypertension   Hypertension   Hypothyroidism   Hypothyroidism         Physical Examination      No qualifying data available   General:  Alert and oriented, No acute distress.    Eye:  Pupils are equal, round and reactive to light, Extraocular movements are intact, Normal conjunctiva.    HENT:  Normocephalic, Oral mucosa is moist.    Neck:  Supple, Non-tender, No lymphadenopathy.    Respiratory:  Lungs are clear to auscultation, Respirations are non-labored, Breath sounds are equal.    Cardiovascular:  Normal rate, Regular rhythm, No edema.    Gastrointestinal:  Soft, Non-tender, Non-distended, Normal bowel sounds.    Integumentary:  Warm, Dry, Pink, Intact.    Neurologic:  Alert, Oriented, Normal sensory, No focal deficits.    Psychiatric:  Cooperative, Appropriate mood & affect.    Breast:  Bilateral breast exam revealed well healed surgical incisions  bilaterally. left breast with some mild, residual tenderness at incisional site .    Lymphatics:  No lymphadenopathy neck, axilla, groin.    Musculoskeletal:  Normal gait.    Cognition and Speech:  Oriented, Speech clear and coherent.    ECOG Performance Scale: 0 - Fully active; no performance restrictions.      Review / Management   Results review:  Last 10 Days Lab Results : Lab View   6/16/2021 12:49 CDT      WBC                       5.3 x10(3)/mcL                             RBC                       4.31 x10(6)/mcL                             Hgb                       12.0 gm/dL                             Hct                       36.9 %  LOW                             Platelet                  278 x10(3)/mcL                             MCV                       85.6 fL                             MCH                       27.8 pg                             MCHC                      32.5 gm/dL  LOW                             RDW                       14.7 %                             MPV                       9.7 fL                             Abs Neut                  2.98 x10(3)/mcL                             NEUT%                     56.3 %  NA                             NEUT#                     3.0 x10(3)/mcL                             LYMPH%                    32.4 %  NA                             LYMPH#                    1.7 x10(3)/mcL                             MONO%                     9.3 %  NA                             MONO#                     0.5 x10(3)/mcL                             EOS%                      0.8 %  NA                             EOS#                      0.0 x10(3)/mcL                             BASO%                     0.4 %  NA                             BASO#                     0.0 x10(3)/mcL  .       Impression and Plan   Diagnoses:  1.  pT1c, N0, M0 stage IA triple positive invasive ductal carcinoma of the right breast.    Plan:  Patient has a stage IA  triple positive breast cancer.  Due to HER-2 being positive, we will plan to treat her with Taxol and Herceptin weekly for 12 weeks.  She will then complete a years worth of Herceptin.  We are planning on starting on 5/13/2021 due to her bilateral breast infection and recent breast reduction.    Echocardiogram completed on 4/14/2021 showed EF of 55-60%.     Baseline DEXA scan completed in 4/8/2021 showed mild osteopenia of the left and right femoral neck.    Okay to proceed with week #6 of treatment tomorrow    Refill of Lomotil sent to pharmacy    RX of Carafate sent to pharmacy     Will increase Pepcid IV from 20mg to 40mg premed with treatment     Will administer IV hydration + MVI tomorrow with treatment     Return to clinic in 3 weeks for TD visit and clinical examination     Labs: CBC, CMP    All questions were answered to the best of my ability and she understands the plan moving forward    Yesy URIOSTEGUI, MIRLANDEP-C

## 2022-04-29 NOTE — DISCHARGE SUMMARY
Patient:   Elina Paris            MRN: [id number]            FIN: [id number]               Age:   71 years     Sex:  Female     :  1950   Associated Diagnoses:   S/P lumpectomy of breast   Author:   Tamera MANZO, Nadeen Shetty      Results Review   General results   Today's results   3/8/2021 11:00 CST       Weight Dosing             101.2 kg                             Weight Measured           101.2 kg                             Weight Measured and Calculated in Lbs     223.11 lb                             Height/Length Dosing      154 cm                             Height/Length Measured    154 cm                             Body Mass Index Measured  42.67 kg/m2                             Weight Loss Surgery History               No                             Oxygen Therapy            Room air    3/8/2021 10:00 CST       Temperature Oral          37.0 DegC                             Temperature Oral (calculated)             98.60 DegF                             Heart Rate Monitored      84 bpm                             Respiratory Rate          20 br/min                             SpO2                      100 %                             Systolic Blood Pressure   147 mmHg  HI                             Diastolic Blood Pressure  83 mmHg                             Blood Pressure Location   Right arm                             Blood Pressure Cuff Size  Adult large        Discharge Information      Discharge Summary Information   Admitted  3/8/2021   Discharged  3/9/2021   Admitting physician     Sandy BOCANEGRA, Marifer CATHERINE     Admitting diagnosis   Discharge diagnosis     S/P lumpectomy of breast ([id number] Z98.890).        Physical Examination   Vital Signs   3/9/2021 5:37 CST        24 HR Intake Totals       2,463.32 mL                             24 HR Output Totals       0 mL                             24 HR I&O Balance         2,463.32 mL    3/9/2021 5:30 CST        24 HR Intake  Totals       2,463.32 mL                             24 HR Output Totals       0 mL                             24 HR I&O Balance         2,463.32 mL    3/9/2021 4:31 CST        Temperature Oral          36.6 DegC                             Temperature Oral (calculated)             97.88 DegF                             Peripheral Pulse Rate     86 bpm                             Respiratory Rate          18 br/min                             SpO2                      96 %                             Oxygen Therapy            Room air                             Systolic Blood Pressure   109 mmHg                             Diastolic Blood Pressure  72 mmHg                             Mean Arterial Pressure, Cuff              84 mmHg    3/9/2021 2:04 CST        Respiratory Rate          20 br/min    3/9/2021 0:07 CST        24 HR Intake Totals       2,162.32 mL                             24 HR Output Totals       0 mL                             24 HR I&O Balance         2,162.32 mL    3/8/2021 23:08 CST       Temperature Oral          36.7 DegC                             Temperature Oral (calculated)             98.06 DegF                             Peripheral Pulse Rate     84 bpm                             Respiratory Rate          20 br/min                             SpO2                      97 %                             Oxygen Therapy            Room air                             Systolic Blood Pressure   121 mmHg                             Diastolic Blood Pressure  73 mmHg                             Mean Arterial Pressure, Cuff              89 mmHg    3/8/2021 21:59 CST       24 HR Intake Totals       1,462.32 mL                             24 HR Output Totals       0 mL                             24 HR I&O Balance         1,462.32 mL    3/8/2021 21:00 CST       Peripheral Pulse Rate     83 bpm                             Respiratory Rate          18 br/min                             SpO2                       97 %                             Oxygen Therapy            Room air                             Systolic Blood Pressure   129 mmHg                             Diastolic Blood Pressure  85 mmHg                             Mean Arterial Pressure, Cuff              100 mmHg    3/8/2021 20:59 CST       24 HR Intake Totals       1,462.32 mL                             24 HR Output Totals       0 mL                             24 HR I&O Balance         1,462.32 mL    3/8/2021 20:29 CST       24 HR Intake Totals       1,462.32 mL                             24 HR Output Totals       0 mL                             24 HR I&O Balance         1,462.32 mL    3/8/2021 20:00 CST       Peripheral Pulse Rate     84 bpm                             Respiratory Rate          8 br/min  LOW                             SpO2                      96 %                             Oxygen Therapy            Nasal cannula                             Oxygen Flow Rate          1 L/min                             Systolic Blood Pressure   128 mmHg                             Diastolic Blood Pressure  70 mmHg                             Mean Arterial Pressure, Cuff              89 mmHg    3/8/2021 19:59 CST       24 HR Intake Totals       1,462.32 mL                             24 HR Output Totals       0 mL                             24 HR I&O Balance         1,462.32 mL    3/8/2021 19:30 CST       Peripheral Pulse Rate     80 bpm                             Respiratory Rate          20 br/min                             SpO2                      96 %                             Oxygen Therapy            Nasal cannula                             Oxygen Flow Rate          2 L/min                             Systolic Blood Pressure   109 mmHg                             Diastolic Blood Pressure  75 mmHg                             Mean Arterial Pressure, Cuff              86 mmHg    3/8/2021 19:14 CST       24 HR Intake Totals        1,431.32 mL                             24 HR Output Totals       0 mL                             24 HR I&O Balance         1,431.32 mL    3/8/2021 19:00 CST       Peripheral Pulse Rate     83 bpm                             Respiratory Rate          20 br/min                             SpO2                      95 %                             Oxygen Therapy            Nasal cannula                             Oxygen Flow Rate          2 L/min                             Systolic Blood Pressure   124 mmHg                             Diastolic Blood Pressure  81 mmHg                             Mean Arterial Pressure, Cuff              95 mmHg    3/8/2021 18:59 CST       24 HR Intake Totals       1,431.32 mL                             24 HR Output Totals       0 mL                             24 HR I&O Balance         1,431.32 mL    3/8/2021 18:30 CST       Peripheral Pulse Rate     82 bpm                             Respiratory Rate          20 br/min                             SpO2                      94 %                             Systolic Blood Pressure   100 mmHg                             Diastolic Blood Pressure  69 mmHg                             Mean Arterial Pressure, Cuff              79 mmHg    3/8/2021 18:15 CST       Peripheral Pulse Rate     85 bpm                             Respiratory Rate          18 br/min                             SpO2                      93 %  LOW                             Oxygen Therapy            Nasal cannula                             Oxygen Flow Rate          2 L/min                             Systolic Blood Pressure   130 mmHg                             Diastolic Blood Pressure  63 mmHg                             Mean Arterial Pressure, Cuff              85 mmHg    3/8/2021 18:00 CST       Peripheral Pulse Rate     85 bpm                             Heart Rate Monitored      84 bpm                             Respiratory Rate          18 br/min                              SpO2                      94 %                             SpO2                      91 %  LOW                             Oxygen Therapy            Room air                             Systolic Blood Pressure   129 mmHg                             Diastolic Blood Pressure  81 mmHg                             Mean Arterial Pressure, Cuff              97 mmHg    3/8/2021 17:50 CST       Temperature Temporal Artery               36.6 DegC                             Peripheral Pulse Rate     83 bpm                             Heart Rate Monitored      85 bpm                             Respiratory Rate          18 br/min                             SpO2                      95 %  (Modified)                            Oxygen Therapy            Nasal cannula                             Oxygen Flow Rate          2 L/min                             Systolic Blood Pressure   163 mmHg  HI                             Diastolic Blood Pressure  82 mmHg                             Mean Arterial Pressure, Cuff              109 mmHg    3/8/2021 17:40 CST       Peripheral Pulse Rate     89 bpm                             Heart Rate Monitored      89 bpm                             Respiratory Rate          18 br/min                             SpO2                      94 %                             Oxygen Therapy            Nasal cannula                             Oxygen Flow Rate          2 L/min                             Systolic Blood Pressure   149 mmHg  HI                             Diastolic Blood Pressure  80 mmHg                             Mean Arterial Pressure, Cuff              103 mmHg    3/8/2021 17:30 CST       Peripheral Pulse Rate     81 bpm                             Heart Rate Monitored      80 bpm                             Respiratory Rate          18 br/min                             SpO2                      95 %                             Oxygen Therapy            Nasal  cannula                             Oxygen Flow Rate          2 L/min                             Systolic Blood Pressure   161 mmHg  HI                             Diastolic Blood Pressure  67 mmHg                             Mean Arterial Pressure, Cuff              98 mmHg    3/8/2021 17:20 CST       Peripheral Pulse Rate     86 bpm                             Heart Rate Monitored      85 bpm                             Respiratory Rate          18 br/min                             SpO2                      95 %                             Oxygen Therapy            Nasal cannula                             Oxygen Flow Rate          2 L/min                             Systolic Blood Pressure   163 mmHg  HI                             Diastolic Blood Pressure  83 mmHg                             Mean Arterial Pressure, Cuff              110 mmHg    3/8/2021 17:10 CST       Peripheral Pulse Rate     82 bpm                             Heart Rate Monitored      82 bpm                             Respiratory Rate          20 br/min                             SpO2                      94 %                             Oxygen Therapy            Nasal cannula                             Oxygen Flow Rate          3 L/min                             Systolic Blood Pressure   170 mmHg  HI                             Diastolic Blood Pressure  77 mmHg                             Mean Arterial Pressure, Cuff              108 mmHg    3/8/2021 17:00 CST       Peripheral Pulse Rate     87 bpm                             Heart Rate Monitored      86 bpm                             Respiratory Rate          20 br/min                             SpO2                      94 %                             Oxygen Therapy            Nasal cannula                             Oxygen Flow Rate          3 L/min                             Systolic Blood Pressure   161 mmHg  HI                             Diastolic Blood Pressure  76 mmHg                              Mean Arterial Pressure, Cuff              104 mmHg    3/8/2021 16:50 CST       Peripheral Pulse Rate     87 bpm                             Heart Rate Monitored      86 bpm                             Respiratory Rate          22 br/min                             SpO2                      95 %                             Oxygen Therapy            Nasal cannula                             Oxygen Flow Rate          3 L/min                             Systolic Blood Pressure   152 mmHg  HI                             Diastolic Blood Pressure  83 mmHg                             Mean Arterial Pressure, Cuff              106 mmHg    3/8/2021 16:41 CST       Temperature Temporal Artery               36.4 DegC                             Peripheral Pulse Rate     89 bpm                             Heart Rate Monitored      88 bpm                             Respiratory Rate          22 br/min                             SpO2                      95 %                             Oxygen Therapy            Nasal cannula                             Oxygen Flow Rate          3 L/min                             Systolic Blood Pressure   151 mmHg  HI                             Diastolic Blood Pressure  83 mmHg                             Mean Arterial Pressure, Cuff              106 mmHg    3/8/2021 13:09 CST       Respiratory Rate          18 br/min    3/8/2021 11:00 CST       Oxygen Therapy            Room air    3/8/2021 10:00 CST       Temperature Oral          37.0 DegC                             Temperature Oral (calculated)             98.60 DegF                             Heart Rate Monitored      84 bpm                             Respiratory Rate          20 br/min                             SpO2                      100 %                             Systolic Blood Pressure   147 mmHg  HI                             Diastolic Blood Pressure  83 mmHg                             Blood  Pressure Location   Right arm                             Blood Pressure Cuff Size  Adult large        Hospital Course   Hospital Course   Admitted from: from home.     Transferred via: by car.     Admitting diagnosis: breast cancer  .     Admission disposition: admit to surgery.     Length of stay: days  2.        Discharge Plan   Discharge Summary Plan   Discharge Status: stable.     Discharge instructions given: to patient, to family member, not to caregiver.     Discharge disposition: discharge to home into the care of family member.     Prescriptions: continue same medications, written and given to patient.     Diagnosis     S/P lumpectomy of breast ([id number] Z98.890).     Course   Progressing as expected.     Education and Follow-up   Counseled: patient, family.     Discharge Planning: ALL--- DISCHARGE INSTRUCTIONS FOR [facility] (SLINGRAM) (Custom), Implanted Port Instructions (Custom), Breast Surgery Post-Op Instructions (SKGRISBY) (CUSTOM), Manuel Mclean In 1 week 3/16/2021 Call Dr. Mclean office for appointment on 3/16/2021; Marifer Delgado 3/18/2021 08:40:00.        Quality Measures

## 2022-04-29 NOTE — PROGRESS NOTES
Patient:   Elina Paris            MRN: [id number]            FIN: [id number]               Age:   71 years     Sex:  Female     :  1950   Associated Diagnoses:   None   Author:   Osmar CASEY MD, Philippe E        Referring Physician:  Marifer Delgado MD         Visit Information     Problem List:   1.  pT1c, N0, M0 stage IA triple positive invasive ductal carcinoma of the right breast.    Treatment Plan:  1.  Weekly taxol and herceptin for 12 weeks followed by a year of maintenance herceptin.  2.  Adjuvant endocrine therapy   3.  Adjuvant radiation therapy.    Diagnosis/Treatment/HPI:   71-year-old female who underwent a screening mammogram on 2020 that showed multiple morphologically oval/circumscribed masses throughout both breasts.  This was read as BI-RADS 0, additional imaging recommended.  Diagnostic mammogram and ultrasound of the left breast on 2021 was performed and showed a 1.6 cm irregular mass at the 3 o'clock position, a 1.0 cm oval mass with obscured margins at the 3 o'clock position and a 0.6 cm irregular mass at the 2 o'clock position.  On ultrasound, the 3:00 lesion 8 cm from the nipple measured 1.6 x 1.2 x 1.2 cm.  This was irregular with angular/microlobulated margins.  There was also posterior shadowing.  The 3 o'clock position 3 cm from the nipple showed a 1.0 x 1.0 x 0.7 cm oval circumscribed mass with internal septation.  The 2 o'clock position lesion 5 cm from the nipple measured 0.6 x 0.5 x 0.7 cm, was avascular with peripheral hyper echogenicity and central hypoechogenicity and indistinct margins.  BI-RADS 4, biopsy recommended.  Patient underwent biopsy on 2021, the 3 o'clock position lesion 8 cm from the nipple revealed invasive ductal carcinoma.  The left breast 3 o'clock position 3 cm from the nipple returned as invasive ductal carcinoma.  The left breast lesion at the 2 o'clock position showed benign breast tissue.  Estrogen receptor was 99.74%  positive, progesterone preceptor was 98.57% positive, HER-2 was 2+ by IHC and positive by FISH.  Patient then underwent bilateral breast MRI on 2/25/2021 which revealed biopsy-proven malignancy at the 3 o'clock position in the left breast spanning 5.7 cm without suspicious enhancement in either breast.  Patient then underwent a left sided lumpectomy on 3/8/2021, final pathology revealed a pT1c, N0, M0 stage IA triple positive invasive ductal carcinoma.  The patient subsequently had a bilateral breast reduction.  This was done by Dr. Manuel Mclean.  She was referred to medical oncology.  I saw the patient on 3/31/2021.  At that visit, she stated that she had bilateral breast infection.  She had no other major issues to discuss.  Baseline DEXA scan completed in 4/8/2021 showed mild osteopenia in the left and right femoral necks.  Echocardiogram completed on 4/14/2021 showed EF of 55-60%.             Chief Complaint    I have breast cancer       5/12/2021 10:01 CDT      Patietn Eduation    5/12/2021 10:00 CDT      Patietn Eduation        Interval History   Patient presents to clinic for scheduled follow up appointment; family member present.  She completed her patient education today and is scheduled to start treatment tomorrow.  She has no drainage noted to her breast--  to touch with some redness.  Denies CP, Shortness of breath, N/V/C/D, fever, chills, or recurrent infections. Appetite and energy are stable. No other questions or concerns.        PMHx:  1.  Coronary artery disease  2.  COPD  3.  Hyperlipidemia  4.  GERD  5.  Hypertension  6.  Hypothyroidism  7.  Anxiety     PSHx:   1.  Left lumpectomy  2.  Cholecystectomy  3.  Total abdominal hysterectomy and unilateral salpingo-oophorectomy  4.  Knee surgery  5.  Sinus surgery  6.  Tonsillectomy  8.  Cervical spinal fusion    Social Hx:   Denies tobacco, alcohol, or illicit drug use.    Family Hx:   She had a brother that had an aneurysm and a  stroke, her mother and brother both had cancer, her mother father and brother had hypertension, her mother and father had heart disease, her mother had thyroid disease.         Review of Systems   12 point review of systems done in full with pertinent positives as described above  Remainder of review systems done in full and unremarkable.   Constitutional:  No fever, No chills, No sweats, No weakness.    Eye:  No double vision, No visual disturbances.    Ear/Nose/Mouth/Throat:  No nasal congestion, No sore throat.    Respiratory:  No shortness of breath, No cough.    Cardiovascular:  No chest pain.    Breast:  Both breasts, Pain.    Gastrointestinal:  No nausea, No vomiting, No diarrhea.    Genitourinary:  No dysuria, No hematuria.    Hematology/Lymphatics:  No bruising tendency, No bleeding tendency.    Musculoskeletal:  No back pain.    Integumentary:  No rash, No pruritus.    Neurologic:  No numbness, No tingling, No headache.    Psychiatric:  No anxiety.       Health Status   Allergies:    Allergic Reactions (Selected)  No Known Allergies,    Allergies (1) Active Reaction  No Known Allergies None Documented     Current medications:    Medications reviewed.   Problem list:    All Problems  Hypothyroidism / 89653122 / Confirmed  Hypertension / 56154618 / Confirmed  Hypertension / 47431349 / Confirmed  Hypothyroidism / 50469439 / Confirmed  COPD - Chronic obstructive pulmonary disease / 856940516 / Confirmed  Arthropathy / 3162355301 / Confirmed  Atherosclerotic heart disease of native coronary artery without angina pectoris / 432016774002852 / Confirmed  CAD - Coronary artery disease / 3275362957 / Confirmed  Breast cancer / 747812033 / Confirmed  GERD - Gastro-esophageal reflux disease / 6571449021 / Confirmed  Dyslipidemia / 6846532465 / Confirmed  History of deep vein thrombosis / 0299858432 / Confirmed  Anxiety / 85715734 / Confirmed  Encounter for echocardiogram before initiation of chemotherapy / 453559118 /  Confirmed,    Active Problems (14)  Anxiety   Arthropathy   Atherosclerotic heart disease of native coronary artery without angina pectoris   Breast cancer   CAD - Coronary artery disease   COPD - Chronic obstructive pulmonary disease   Dyslipidemia   Encounter for echocardiogram before initiation of chemotherapy   GERD - Gastro-esophageal reflux disease   History of deep vein thrombosis   Hypertension   Hypertension   Hypothyroidism   Hypothyroidism         Histories   Past Medical History:    Active  Hypertension (20064174)  Hypothyroidism (28641017)   Family History:    Cancer  Mother  Brother  Aneurysm  Brother  Thyroid  Mother  Heart disease......  Mother  Father  CVA (cerebrovascular accident).......  Brother  HTN (hypertension)......  Father  Mother  Brother     Procedure history:    Catheter Insertion Mediport (Right) performed by Marifer Delgado MD on 3/8/2021 at 71 Years.  Comments:  3/8/2021 16:48 Oralia Edward RN  auto-populated from documented surgical case  Breast Reduction (Bilateral) performed by Manuel Mclean MD on 3/8/2021 at 71 Years.  Comments:  3/8/2021 16:48 Oralia Edward RN  auto-populated from documented surgical case  Partial Mastectomy W/[place] Node Bx (Left) performed by Marifer Delgado MD on 3/8/2021 at 71 Years.  Comments:  3/8/2021 16:48 Oralia Edward RN  auto-populated from documented surgical case  Colonoscopy performed by Adria Sanderson MD on 7/6/2016 at 66 Years.  Comments:  7/6/2016 12:53 CDT - Dino Pack RN  auto-populated from documented surgical case  Cervical spinal fusion (SNOMED CT 521415788) in 2001 at 51 Years.  Cervical spinal fusion (SNOMED CT 531942864) in 1995 at 45 Years.  Partial hysterectomy (SNOMED CT 2848050839) in 1985 at 35 Years.  Cholecystectomy; (CPT4 96247).  Tonsillectomy, primary or secondary; younger than age 12 (CPT4 02018).  knee surgery.  sinus surgery.      Physical Examination   Vital Signs    5/12/2021 10:01 CDT      Peripheral Pulse Rate     89 bpm                             Systolic Blood Pressure   140 mmHg                             Diastolic Blood Pressure  68 mmHg                             Blood Pressure Location   Right arm                             Manual Cuff BP            No     Measurements from flowsheet : Measurements   5/12/2021 10:01 CDT      Weight Dosing             97.800 kg                             Weight Measured           97.8 kg                             Weight Measured and Calculated in Lbs     215.61 lb                             Height/Length Dosing      163.00 cm                             Height/Length Measured    163 cm                             BSA Measured              2.1 m2                             Body Mass Index Measured  36.81 kg/m2        Vital Signs (last 24 hrs)_____  Last Charted___________  Heart Rate Peripheral   89 bpm  (MAY 12 10:01)  SBP      140 mmHg  (MAY 12 10:01)  DBP      68 mmHg  (MAY 12 10:01)  Weight      97.8 kg  (MAY 12 10:01)  Height      163 cm  (MAY 12 10:01)  BMI      36.81  (MAY 12 10:01)     General:  Alert and oriented, No acute distress.    Eye:  Extraocular movements are intact, Normal conjunctiva.    HENT:  Normocephalic, Oral mucosa is moist.    Neck:  Supple, No lymphadenopathy.    Respiratory:  Lungs are clear to auscultation, Respirations are non-labored, Breath sounds are equal.    Cardiovascular:  Normal rate, Regular rhythm, No edema.    Gastrointestinal:  Soft, Non-tender, Non-distended, Normal bowel sounds.    Integumentary:  Warm, Dry, Intact.    Neurologic:  Alert, Oriented, Normal sensory, No focal deficits.    Psychiatric:  Cooperative, Appropriate mood & affect.    Breast:  Bilateral breast exam revealed resolved infection with no evidence of drainage from her wounds..    Lymphatics:  No lymphadenopathy neck, axilla, groin.    Cognition and Speech:  Oriented, Speech clear and coherent.    ECOG Performance  Scale: 0 - Fully active; no performance restrictions.      Review / Management   Results review      Impression and Plan   Diagnoses:  1.  pT1c, N0, M0 stage IA triple positive invasive ductal carcinoma of the right breast.    Plan:  Patient has a stage IA triple positive breast cancer.  Due to HER-2 being positive, we will plan to treat her with Taxol and Herceptin weekly for 12 weeks.  She will then complete a years worth of Herceptin.  We are planning on starting on 5/13/2021 due to her bilateral breast infection and recent breast reduction.    Echocardiogram completed on 4/14/2021 showed EF of 55-60%.     Baseline DEXA scan completed in 4/8/2021 showed mild osteopenia of the left and right femoral neck.    Okay to proceed with treatment tomorrow    Return to clinic in 2-3 weeks for TD visit and clinical examination     Labs: CBC, CMP    All questions were answered to the best of my ability and she understands the plan moving forward    Scout Prasad II, MD         Professional Services   I, Jeniffer Giraldo LPN, acted solely as a scribe for and in the presence of, Dr. Scout Prasad who performed the service.

## 2022-04-29 NOTE — PROGRESS NOTES
Patient:   Elina Paris            MRN: [id number]            FIN: [id number]               Age:   71 years     Sex:  Female     :  1950   Associated Diagnoses:   None   Author:   Osmar CASEY MD, Philippe E        Referring Physician:  Marifer Delgado MD         Visit Information     Problem List:   1.  pT1c, N0, M0 stage IA triple positive invasive ductal carcinoma of the right breast.    Treatment Plan:  1.  Weekly taxol and herceptin for 12 weeks followed by a year of maintenance herceptin.  Week 1 given on 2021.    2.  Adjuvant endocrine therapy   3.  Adjuvant radiation therapy to start on 2021.    Diagnosis/Treatment/HPI:   71-year-old female who underwent a screening mammogram on 2020 that showed multiple morphologically oval/circumscribed masses throughout both breasts.  This was read as BI-RADS 0, additional imaging recommended.  Diagnostic mammogram and ultrasound of the left breast on 2021 was performed and showed a 1.6 cm irregular mass at the 3 o'clock position, a 1.0 cm oval mass with obscured margins at the 3 o'clock position and a 0.6 cm irregular mass at the 2 o'clock position.  On ultrasound, the 3:00 lesion 8 cm from the nipple measured 1.6 x 1.2 x 1.2 cm.  This was irregular with angular/microlobulated margins.  There was also posterior shadowing.  The 3 o'clock position 3 cm from the nipple showed a 1.0 x 1.0 x 0.7 cm oval circumscribed mass with internal septation.  The 2 o'clock position lesion 5 cm from the nipple measured 0.6 x 0.5 x 0.7 cm, was avascular with peripheral hyper echogenicity and central hypoechogenicity and indistinct margins.  BI-RADS 4, biopsy recommended.  Patient underwent biopsy on 2021, the 3 o'clock position lesion 8 cm from the nipple revealed invasive ductal carcinoma.  The left breast 3 o'clock position 3 cm from the nipple returned as invasive ductal carcinoma.  The left breast lesion at the 2 o'clock position showed benign  breast tissue.  Estrogen receptor was 99.74% positive, progesterone preceptor was 98.57% positive, HER-2 was 2+ by IHC and positive by FISH.  Patient then underwent bilateral breast MRI on 2/25/2021 which revealed biopsy-proven malignancy at the 3 o'clock position in the left breast spanning 5.7 cm without suspicious enhancement in either breast.  Patient then underwent a left sided lumpectomy on 3/8/2021, final pathology revealed a pT1c, N0, M0 stage IA triple positive invasive ductal carcinoma.  The patient subsequently had a bilateral breast reduction.  This was done by Dr. Manuel Mclean.  She was referred to medical oncology.  I saw the patient on 3/31/2021.  At that visit, she stated that she had bilateral breast infection.  She had no other major issues to discuss.  Baseline DEXA scan completed in 4/8/2021 showed mild osteopenia in the left and right femoral necks.  Echocardiogram completed on 4/14/2021 showed EF of 55-60%.  Taxol and herceptin started on 5/13/2021, week 12 given on 8/19/2021.  Repeat ECHO on 8/31/2021 showed EF of 55-60%.  Radiation will start on 9/2/2021.  Start maintenance Herceptin on 9/9/2021.               Chief Complaint    Follow Up   9/1/2021 10:07 CDT       patient stated that she has been very short of breath      Interval History   Patient presents to clinic for scheduled follow up appointment. Overall, she is doing well but does report shortness of breath since she had COVID-19 in July 2021.  She is not requiring oxygen, but does notice that she is experiencing SOB.    PMHx:  1.  Coronary artery disease  2.  COPD  3.  Hyperlipidemia  4.  GERD  5.  Hypertension  6.  Hypothyroidism  7.  Anxiety     PSHx:   1.  Left lumpectomy  2.  Cholecystectomy  3.  Total abdominal hysterectomy and unilateral salpingo-oophorectomy  4.  Knee surgery  5.  Sinus surgery  6.  Tonsillectomy  8.  Cervical spinal fusion    Social Hx:   Denies tobacco, alcohol, or illicit drug use.    Family Hx:   She  had a brother that had an aneurysm and a stroke, her mother and brother both had cancer, her mother father and brother had hypertension, her mother and father had heart disease, her mother had thyroid disease.         Review of Systems   12 point review of systems done in full with pertinent positives as described above  Remainder of review systems done in full and unremarkable.   Constitutional:  No fever, No chills, No sweats, No weakness.    Eye:  No double vision, No visual disturbances.    Ear/Nose/Mouth/Throat:  No nasal congestion, No sore throat.    Respiratory:  No shortness of breath, No cough.    Cardiovascular:  No chest pain.    Breast:  Both breasts, Pain.    Gastrointestinal:  No nausea, No vomiting, No diarrhea.    Genitourinary:  No dysuria, No hematuria.    Hematology/Lymphatics:  No bruising tendency, No bleeding tendency.    Musculoskeletal:  No back pain.    Integumentary:  No rash, No pruritus.    Neurologic:  No numbness, No tingling, No headache.    Psychiatric:  No anxiety.       Health Status   Allergies:    Allergic Reactions (Selected)  No Known Allergies,    Allergies (1) Active Reaction  No Known Allergies None Documented     Current medications:  (Selected)   Outpatient Medications  Future  Aloxi (for IVPB): 0.25 mg, form: Injection, IV Piggyback, Once-chemo, Infuse over: 20 minute(s), first dose 08/26/21 10:40:00 CDT, stop date 08/26/21 10:40:00 CDT, Future Order, Weeks 3  Benadryl 50mg/ml Inj: 25 mg, form: Injection, IV Piggyback, Once-chemo, Infuse over: 20 minute(s), first dose 08/26/21 10:40:00 CDT, stop date 08/26/21 10:40:00 CDT, Routine, Weeks 3, Future Order  Emend (for IVPB): 150 mg, form: Powder-Inj, IV Piggyback, Once-chemo, Infuse over: 30 minute(s), first dose 08/26/21 10:00:00 CDT, stop date 08/26/21 10:00:00 CDT, Future Order, Weeks 3  Heparin Flush 100 U/mL - 5 mL: 500 units, form: Injection, IV Push, Once-chemo, first dose 08/26/21 12:30:00 CDT, stop date 08/26/21  12:30:00 CDT, Routine, Weeks 3, Future Order  Herceptin (for IVPB): 183 mg, form: Injection, IV Piggyback, Once-chemo, Infuse over: 30 minute(s), first dose 08/26/21 10:00:00 CDT, stop date 08/26/21 10:00:00 CDT, Future Order, Weeks 3  Normal Saline (0.9% NS) IV: 1,000 mL, 1,000 mL, IV Piggyback, Once, 500 mL/hr, start date 08/26/21 10:00:00 CDT, stop date 08/26/21 10:00:00 CDT, Weeks 3  Pepcid (for IVPB): 40 mg, form: Injection, IV Piggyback, Once-chemo, Infuse over: 20 minute(s), first dose 08/26/21 10:40:00 CDT, stop date 08/26/21 10:40:00 CDT, Future Order, Weeks 3  Taxol (for IVPB): 156 mg, form: Injection, IV Piggyback, Once-chemo, Infuse over: 1 hr, first dose 08/26/21 11:00:00 CDT, stop date 08/26/21 11:00:00 CDT, Future Order, Weeks 3  acetaminophen: 650 mg, form: Tab, Oral, Once-chemo, first dose 08/26/21 10:40:00 CDT, stop date 08/26/21 10:40:00 CDT, Routine, Weeks 3, Future Order  dexamethasone (for IVPB): 10 mg, form: Soln, IV Piggyback, Once-chemo, Infuse over: 20 minute(s), first dose 08/26/21 10:40:00 CDT, stop date 08/26/21 10:40:00 CDT, Future Order, Weeks 3  Pending Complete  midazolam: 2 mg, form: Injection, IV Push, q5min, Order duration: 2 dose(s), first dose 03/08/21 10:30:00 CST, stop date 03/08/21 10:39:00 CST, (up to 5 mg for moderate anxiety)  Prescriptions  Prescribed  Carafate 1 g/10 mL oral suspension: 1 gm = 10 mL, Oral, BID, on an empty stomach, # 280 mL, 1 Refill(s), Pharmacy: [facility]/pharmacy #[id number], 163, cm, Height/Length Dosing, 06/10/21 9:38:00 CDT, 94.1, kg, Weight Dosing, 06/10/21 9:38:00 CDT  Lomotil 2.5 mg-0.025 mg oral tablet: 1 tab(s), Oral, QID, PRN PRN for loose stool, # 24 tab(s), 1 Refill(s), Pharmacy: [facility]/pharmacy #[id number], 163, cm, Height/Length Dosing, 05/26/21 10:26:00 CDT, 94.1, kg, Weight Dosing, 05/26/21 10:26:00 CDT  Phenergan 25 mg Tab: See Instructions, 1 tab(s) Oral q6hr prn N+V, # 30 tab(s), 0 Refill(s), Pharmacy: [facility]/pharmacy #[id number], 155, cm, Height/Length Dosing,  21 10:44:00 CDT, 97.8, kg, Weight Dosing, 21 10:44:00 CDT  codeine-guaifenesin 10 mg-100 mg/5 mL oral syrup: 5 mL, Oral, q6hr, PRN PRN for cough and congestion, # 120 mL, 0 Refill(s), [id number], Patient Education Provided, Patient Verbalizes Understanding, 163, cm, Height/Length Dosing, 21 10:09:00 CDT, 91.4, kg, Weight Dosing, ...  olanzapine 5 mg oral tablet, disintegratin mg = 1 tab(s), Oral, Once a day (at bedtime), for nausea, # 30 tab(s), 1 Refill(s), [id number], 163, cm, Height/Length Dosing, 21 10:26:00 CDT, 94.1, kg, Weight Dosing, 21 10:26:00 CDT  prochlorperazine 5 mg oral tablet: 5 mg = 1 tab(s), Oral, TID, PRN PRN for nausea/vomiting, # 30 tab(s), 0 Refill(s), [id number], 163, cm, Height/Length Dosing, 21 10:26:00 CDT, 94.1, kg, Weight Dosing, 21 10:26:00 CDT  Documented Medications  Documented  ASPIRIN EC 81 MG TABLET: 81 mg = 1 tab(s), Oral, Daily  Benadryl 25 mg oral capsule: 25 mg = 1 cap(s), Oral, Once a day (at bedtime), PRN PRN for insomnia, 0 Refill(s)  Caltrate 600 + D oral tablet: 1 tab(s), Oral, 0 Refill(s)  Centrum Adults oral tablet: Oral, Daily, 0 Refill(s)  Diltiazem Hydrochloride  mg/24 hours oral capsule, extended release: 0 Refill(s)  Flonase 50 mcg/inh nasal spray: Daily, PRN PRN nasal congestion, 0 Refill(s)  Linzess 290 mcg oral capsule: 290 mcg = 1 cap(s), Oral, Daily, PRN PRN constipation, # 30 cap(s), 0 Refill(s)  Sea cantu gel: Sea cantu gel, 30 mL, Oral, Daily, 0 Refill(s)  Trelegy Ellipta 100 mcg-62.5 mcg-25 mcg/inh inhalation powder: = 1 puff(s), INH, Daily  Xanax 0.25 mg oral tablet: 0.25 mg = 1 tab(s), Oral, Daily  acetaminophen-hydrocodone 325 mg-7.5 mg oral tablet: 1 tab(s), Oral, q6hr  atorvastatin 40 mg oral tablet: 40 mg = 1 tab(s), Oral, Daily, # 30 tab(s), 0 Refill(s)  clindamycin 300 mg oral capsule: 300 mg = 1 cap(s), Oral, q6hr  docusate sodium 100 mg oral  capsule: 100 mg = 1 cap(s), Oral, TID  esomeprazole 40 mg oral DR capsule: 40 mg = 1 cap(s), Oral, Daily  hydrochlorothiazide-triamterene 25 mg-37.5 mg oral tablet: 1 tab(s), Oral, Daily, # 30 tab(s), 0 Refill(s)  levothyroxine 50 mcg (0.05 mg) oral tablet: 50 mcg = 1 tab(s), Oral, Daily, # 30 tab(s), 0 Refill(s)  nitroglycerin 0.4 mg sublingual TAB:   ondansetron 4 mg oral tablet, disintegratin mg = 1 tab(s), Oral, TID  ondansetron 8 mg oral tablet: 8 mg = 1 tab(s), Oral, TID   Problem list:    All Problems  Hypothyroidism / 53471805 / Confirmed  Hypertension / 72763542 / Confirmed  Hypertension / 11922234 / Confirmed  Hypothyroidism / 61959797 / Confirmed  COPD - Chronic obstructive pulmonary disease / 499002046 / Confirmed  Arthropathy / 3702984904 / Confirmed  Atherosclerotic heart disease of native coronary artery without angina pectoris / 084062277844056 / Confirmed  CAD - Coronary artery disease / 7217038403 / Confirmed  Breast cancer / 937752433 / Confirmed  GERD - Gastro-esophageal reflux disease / 9540839978 / Confirmed  Dyslipidemia / 5876070922 / Confirmed  History of deep vein thrombosis / 4785466665 / Confirmed  Anxiety / 64535483 / Confirmed  Encounter for echocardiogram before initiation of chemotherapy / 128915211 / Confirmed,    Active Problems (14)  Anxiety   Arthropathy   Atherosclerotic heart disease of native coronary artery without angina pectoris   Breast cancer   CAD - Coronary artery disease   COPD - Chronic obstructive pulmonary disease   Dyslipidemia   Encounter for echocardiogram before initiation of chemotherapy   GERD - Gastro-esophageal reflux disease   History of deep vein thrombosis   Hypertension   Hypertension   Hypothyroidism   Hypothyroidism         Histories   Past Medical History:    Active  Hypertension (76590985)  Hypothyroidism (31499163)   Family History:    Cancer  Mother  Brother  Aneurysm  Brother  Thyroid  Mother  Heart disease......  Mother  Father  CVA  (cerebrovascular accident).......  Brother  HTN (hypertension)......  Father  Mother  Brother     Procedure history:    Catheter Insertion Mediport (Right) performed by Marifer Delgado MD on 3/8/2021 at 71 Years.  Comments:  3/8/2021 16:48 Oralia Edward RN  auto-populated from documented surgical case  Breast Reduction (Bilateral) performed by Manuel Mclean MD on 3/8/2021 at 71 Years.  Comments:  3/8/2021 16:48 Oralia Edward RN  auto-populated from documented surgical case  Partial Mastectomy W/[place] Node Bx (Left) performed by Marifer Delgado MD on 3/8/2021 at 71 Years.  Comments:  3/8/2021 16:48 Oralia Edward RN  auto-populated from documented surgical case  Colonoscopy performed by Adria Sanderson MD on 7/6/2016 at 66 Years.  Comments:  7/6/2016 12:53 LIZETT - Dino Pack RN  auto-populated from documented surgical case  Cervical spinal fusion (SNOMED CT 612496276) in 2001 at 51 Years.  Cervical spinal fusion (SNOMED CT 075041461) in 1995 at 45 Years.  Partial hysterectomy (SNOMED CT 4396239534) in 1985 at 35 Years.  Cholecystectomy; (CPT4 16323).  Tonsillectomy, primary or secondary; younger than age 12 (CPT4 62737).  knee surgery.  sinus surgery.   Social History        Social & Psychosocial Habits    Alcohol  07/12/2015 Risk Assessment: Denies Alcohol Use    06/19/2018  Use: Never    Employment/School  03/08/2021  Status: Retired    Highest education: None    Substance Use  07/12/2015 Risk Assessment: Denies Substance Abuse    03/04/2021  Use: Never    Tobacco  07/12/2015 Risk Assessment: Denies Tobacco Use    08/19/2021  Use: Former smoker, quit more    Patient Wants Consult For Cessation Counseling N/A    Comment: Quit 8 years ago. - 06/17/2021 10:35 - Lorena Merrill RN    Abuse/Neglect  08/19/2021  SHX Any signs of abuse or neglect No    Feels unsafe at home: No    Safe place to go: Yes    Spiritual/Cultural  03/08/2021  Rastafari Preference  Latter-day  .        Physical Examination      No qualifying data available   General:  Alert and oriented, No acute distress.    Eye:  Pupils are equal, round and reactive to light, Extraocular movements are intact, Normal conjunctiva.    HENT:  Normocephalic, Oral mucosa is moist.    Neck:  Supple, Non-tender, No lymphadenopathy.    Respiratory:  Lungs are clear to auscultation, Respirations are non-labored, Breath sounds are equal.    Cardiovascular:  Normal rate, Regular rhythm, No edema.    Gastrointestinal:  Soft, Non-tender, Non-distended, Normal bowel sounds.    Integumentary:  Warm, Dry, Pink, Intact.    Neurologic:  Alert, Oriented, Normal sensory, No focal deficits.    Psychiatric:  Cooperative, Appropriate mood & affect.    Breast:  deferred today.    Lymphatics:  No lymphadenopathy neck, axilla, groin.    Musculoskeletal:  Normal gait.    Cognition and Speech:  Oriented, Speech clear and coherent.    ECOG Performance Scale: 0 - Fully active; no performance restrictions.      Review / Management   Results review:  Lab results   9/1/2021 10:01 CDT       WBC                       9.6 x10(3)/mcL                             RBC                       3.73 x10(6)/mcL  LOW                             Hgb                       10.4 gm/dL  LOW                             Hct                       34.4 %  LOW                             Platelet                  331 x10(3)/mcL                             MCV                       92.2 fL                             MCH                       27.9 pg                             MCHC                      30.2 gm/dL  LOW                             RDW                       17.0 %                             MPV                       9.1 fL  LOW                             Abs Neut                  5.44 x10(3)/mcL                             NEUT%                     56.4 %  NA                             NEUT#                     5.4 x10(3)/mcL                              LYMPH%                    31.8 %  NA                             LYMPH#                    3.1 x10(3)/mcL                             MONO%                     10.5 %  NA                             MONO#                     1.0 x10(3)/mcL                             EOS%                      1.0 %  NA                             EOS#                      0.1 x10(3)/mcL                             BASO%                     0.1 %  NA                             BASO#                     0.0 x10(3)/mcL  .       Impression and Plan   Diagnoses:  1.  pT1c, N0, M0 stage IA triple positive invasive ductal carcinoma of the right breast.    Plan:  Patient has a stage IA triple positive breast cancer.  Due to HER-2 being positive, we will plan to treat her with Taxol and Herceptin weekly for 12 weeks.  She will then complete a years worth of Herceptin.  We are planning on starting on 5/13/2021 due to her bilateral breast infection and recent breast reduction.    Echocardiogram completed on 4/14/2021 showed EF of 55-60%. Repeat ECHO on 8/31/2021 with EF 55-60%.    Baseline DEXA scan completed in 4/8/2021 showed mild osteopenia of the left and right femoral neck.    She was seen by Dr. Renee on 8/23/2021 and starts radiation on 9/2/2021.    We will start her maintenance Herceptin on 9/9/2021.  She will complete 1 year worth.    Return to clinic in 4 weeks prior to second dose of maintenance Herceptin for TD visit and clinical examination    Labs: CBC, CMP    All questions were answered to the best of my ability and she understands the plan moving forward    Scout Prasad II, MD         Professional Services   I, Jeniffer Giraldo LPN, acted solely as a scribe for and in the presence of, Dr. Scout Prasad who performed the service.

## 2022-04-29 NOTE — PROGRESS NOTES
Patient:   Elina Paris            MRN: [id number]            FIN: [id number]               Age:   71 years     Sex:  Female     :  1950   Associated Diagnoses:   None   Author:   Ga MOHAN, Yesy FISHER        Referring Physician:  Marifer Delgado MD         Visit Information     Problem List:   1.  pT1c, N0, M0 stage IA triple positive invasive ductal carcinoma of the right breast.    Treatment Plan:  1.  Weekly taxol and herceptin for 12 weeks followed by a year of maintenance herceptin.  Week 1 given on 2021, week 12 given on 2021.  Maintenance Herceptin started on 2021  2.  Adjuvant endocrine therapy   3.  Adjuvant radiation therapy to start on 2021, completed 2021.    Diagnosis/Treatment/HPI:   71-year-old female who underwent a screening mammogram on 2020 that showed multiple morphologically oval/circumscribed masses throughout both breasts.  This was read as BI-RADS 0, additional imaging recommended.  Diagnostic mammogram and ultrasound of the left breast on 2021 was performed and showed a 1.6 cm irregular mass at the 3 o'clock position, a 1.0 cm oval mass with obscured margins at the 3 o'clock position and a 0.6 cm irregular mass at the 2 o'clock position.  On ultrasound, the 3:00 lesion 8 cm from the nipple measured 1.6 x 1.2 x 1.2 cm.  This was irregular with angular/microlobulated margins.  There was also posterior shadowing.  The 3 o'clock position 3 cm from the nipple showed a 1.0 x 1.0 x 0.7 cm oval circumscribed mass with internal septation.  The 2 o'clock position lesion 5 cm from the nipple measured 0.6 x 0.5 x 0.7 cm, was avascular with peripheral hyper echogenicity and central hypoechogenicity and indistinct margins.  BI-RADS 4, biopsy recommended.  Patient underwent biopsy on 2021, the 3 o'clock position lesion 8 cm from the nipple revealed invasive ductal carcinoma.  The left breast 3 o'clock position 3 cm from the nipple returned as  invasive ductal carcinoma.  The left breast lesion at the 2 o'clock position showed benign breast tissue.  Estrogen receptor was 99.74% positive, progesterone preceptor was 98.57% positive, HER-2 was 2+ by IHC and positive by FISH.  Patient then underwent bilateral breast MRI on 2/25/2021 which revealed biopsy-proven malignancy at the 3 o'clock position in the left breast spanning 5.7 cm without suspicious enhancement in either breast.  Patient then underwent a left sided lumpectomy on 3/8/2021, final pathology revealed a pT1c, N0, M0 stage IA triple positive invasive ductal carcinoma.  The patient subsequently had a bilateral breast reduction.  This was done by Dr. Manuel Mclean.  She was referred to medical oncology.  I saw the patient on 3/31/2021.  At that visit, she stated that she had bilateral breast infection.  She had no other major issues to discuss.  Baseline DEXA scan completed in 4/8/2021 showed mild osteopenia in the left and right femoral necks.  Echocardiogram completed on 4/14/2021 showed EF of 55-60%.  Taxol and herceptin started on 5/13/2021, week 12 given on 8/19/2021.  Repeat ECHO on 8/31/2021 showed EF of 55-60%.  Radiation started on 9/2/2021.  Started maintenance Herceptin on 9/9/2021.  Completed radiation on 9/30/2021.         Chief Complaint    Follow Up      Interval History   Patient presents to clinic for scheduled follow up appointment. She is doing well overall w/o any notable complaints aside from continued redness/tenderness of left breast following radiation therapy. She states the area is slowly improving but still very uncomfortable. Aside from this, no new complaints. She is tolerating treatment w/o issue. Denies HA, SOB, CP, N/V/C/D. CBC today reviewed and stable for treatment tomorrow as planned.   We discussed initiation of endocrine therapy today including medication, dosing and possible side effects- patient voiced understanding of education. Tentative plan to start at  next clinic visit.     PMHx:  1.  Coronary artery disease  2.  COPD  3.  Hyperlipidemia  4.  GERD  5.  Hypertension  6.  Hypothyroidism  7.  Anxiety     PSHx:   1.  Left lumpectomy  2.  Cholecystectomy  3.  Total abdominal hysterectomy and unilateral salpingo-oophorectomy  4.  Knee surgery  5.  Sinus surgery  6.  Tonsillectomy  8.  Cervical spinal fusion    Social Hx:   Denies tobacco, alcohol, or illicit drug use.    Family Hx:   She had a brother that had an aneurysm and a stroke, her mother and brother both had cancer, her mother father and brother had hypertension, her mother and father had heart disease, her mother had thyroid disease.         Review of Systems   12 point review of systems done in full with pertinent positives as described above  Remainder of review systems done in full and unremarkable.   Constitutional:  No fever, No chills, No sweats, No weakness.    Eye:  No double vision, No visual disturbances.    Ear/Nose/Mouth/Throat:  No nasal congestion, No sore throat.    Respiratory:  No shortness of breath, No cough.    Cardiovascular:  No chest pain.    Breast:  Both breasts, Pain.    Gastrointestinal:  No nausea, No vomiting, No diarrhea.    Genitourinary:  No dysuria, No hematuria.    Hematology/Lymphatics:  No bruising tendency, No bleeding tendency.    Musculoskeletal:  No back pain.    Integumentary:  No rash, No pruritus.    Neurologic:  No numbness, No tingling, No headache.    Psychiatric:  No anxiety.       Health Status   Allergies:    Allergic Reactions (Selected)  No Known Allergies,    Allergies (1) Active Reaction  No Known Allergies None Documented     Current medications:  (Selected)   Outpatient Medications  Ordered  acetaminophen: 650 mg, form: Tab, Oral, Once-chemo, first dose 09/30/21 13:00:00 CDT, stop date 09/30/21 13:00:00 CDT, Routine, Weeks 4  Future  Benadryl (for IVPB): 25 mg, form: Injection, IV Piggyback, Once-chemo, Infuse over: 20 minute(s), first dose 11/11/21  13:00:00 CST, stop date 21 13:00:00 CST, Future Order, Weeks 10  Heparin Flush 100 U/mL - 5 mL: 500 units, form: Injection, IV Push, Once-chemo, first dose 21 13:00:00 CST, stop date 21 13:00:00 CST, Routine, Weeks 10, Future Order  Herceptin: 546 mg, form: Injection, IV Piggyback, Once, Infuse over: 30 minute(s), first dose 21 13:00:00 CST, stop date 21 13:00:00 CST, Future Order, Weeks 10  acetaminophen: 650 mg, form: Tab, Oral, Once-chemo, first dose 21 13:00:00 CST, stop date 21 13:00:00 CST, Routine, Weeks 10, Future Order  Pending Complete  midazolam: 2 mg, form: Injection, IV Push, q5min, Order duration: 2 dose(s), first dose 21 10:30:00 CST, stop date 21 10:39:00 CST, (up to 5 mg for moderate anxiety)  Prescriptions  Prescribed  Carafate 1 g/10 mL oral suspension: 1 gm = 10 mL, Oral, BID, on an empty stomach, # 280 mL, 1 Refill(s), Pharmacy: [facility]/pharmacy #[id number], 163, cm, Height/Length Dosing, 06/10/21 9:38:00 CDT, 94.1, kg, Weight Dosing, 06/10/21 9:38:00 CDT  Lomotil 2.5 mg-0.025 mg oral tablet: 1 tab(s), Oral, QID, PRN PRN for loose stool, # 24 tab(s), 1 Refill(s), Pharmacy: [facility]/pharmacy #[id number], 163, cm, Height/Length Dosing, 21 10:26:00 CDT, 94.1, kg, Weight Dosing, 21 10:26:00 CDT  Phenergan 25 mg Tab: See Instructions, 1 tab(s) Oral q6hr prn N+V, # 30 tab(s), 0 Refill(s), Pharmacy: [facility]/pharmacy #[id number], 155, cm, Height/Length Dosing, 21 10:44:00 CDT, 97.8, kg, Weight Dosing, 21 10:44:00 CDT  codeine-guaifenesin 10 mg-100 mg/5 mL oral syrup: 5 mL, Oral, q6hr, PRN PRN for cough and congestion, # 120 mL, 0 Refill(s), Pharmacy: [facility]/pharmacy #[id number], Patient Education Provided, Patient Verbalizes Understanding, 163, cm, Height/Length Dosing, 21 10:09:00 CDT, 91.4, kg, Weight Dosing, ...  olanzapine 5 mg oral tablet, disintegratin mg = 1 tab(s), Oral, Once a day (at bedtime), for nausea, # 30 tab(s), 1 Refill(s), Pharmacy:  [facility]/pharmacy #[id number], 163, cm, Height/Length Dosing, 21 10:26:00 CDT, 94.1, kg, Weight Dosing, 21 10:26:00 CDT  prochlorperazine 5 mg oral tablet: 5 mg = 1 tab(s), Oral, TID, PRN PRN for nausea/vomiting, # 30 tab(s), 0 Refill(s), [id number], 163, cm, Height/Length Dosing, 21 10:26:00 CDT, 94.1, kg, Weight Dosing, 21 10:26:00 CDT  Documented Medications  Documented  ASPIRIN EC 81 MG TABLET: 81 mg = 1 tab(s), Oral, Daily  Advil: Oral, q6hr, 0 Refill(s)  Benadryl 25 mg oral capsule: 25 mg = 1 cap(s), Oral, Once a day (at bedtime), PRN PRN for insomnia, 0 Refill(s)  Caltrate 600 + D oral tablet: 1 tab(s), Oral, 0 Refill(s)  Centrum Adults oral tablet: Oral, Daily, 0 Refill(s)  Diltiazem Hydrochloride  mg/24 hours oral capsule, extended release: 0 Refill(s)  Flonase 50 mcg/inh nasal spray: Daily, PRN PRN nasal congestion, 0 Refill(s)  Linzess 290 mcg oral capsule: 290 mcg = 1 cap(s), Oral, Daily, PRN PRN constipation, # 30 cap(s), 0 Refill(s)  Sea cantu gel: Sea cantu gel, 30 mL, Oral, Daily, 0 Refill(s)  Trelegy Ellipta 100 mcg-62.5 mcg-25 mcg/inh inhalation powder: = 1 puff(s), INH, Daily  Xanax 0.25 mg oral tablet: 0.25 mg = 1 tab(s), Oral, Daily  acetaminophen-hydrocodone 325 mg-7.5 mg oral tablet: 1 tab(s), Oral, q6hr  atorvastatin 40 mg oral tablet: 40 mg = 1 tab(s), Oral, Daily, # 30 tab(s), 0 Refill(s)  clindamycin 300 mg oral capsule: 300 mg = 1 cap(s), Oral, q6hr  docusate sodium 100 mg oral capsule: 100 mg = 1 cap(s), Oral, TID  esomeprazole 40 mg oral DR capsule: 40 mg = 1 cap(s), Oral, Daily  hydrochlorothiazide-triamterene 25 mg-37.5 mg oral tablet: 1 tab(s), Oral, Daily, # 30 tab(s), 0 Refill(s)  levothyroxine 50 mcg (0.05 mg) oral tablet: 50 mcg = 1 tab(s), Oral, Daily, # 30 tab(s), 0 Refill(s)  nitroglycerin 0.4 mg sublingual TAB:   ondansetron 4 mg oral tablet, disintegratin mg = 1 tab(s), Oral, TID  ondansetron 8 mg oral tablet: 8 mg = 1 tab(s), Oral, TID    Problem list:    All Problems  Anxiety / SNOMED CT 88567291 / Confirmed  Arthropathy / SNOMED CT 5201478572 / Confirmed  Atherosclerotic heart disease of native coronary artery without angina pectoris / SNOMED CT 183854158531722 / Confirmed  Breast cancer / SNOMED CT 080808773 / Confirmed  left  CAD - Coronary artery disease / SNOMED CT 0708306142 / Confirmed  COPD - Chronic obstructive pulmonary disease / SNOMED CT 085560893 / Confirmed  Dyslipidemia / SNOMED CT 6702014628 / Confirmed  GERD - Gastro-esophageal reflux disease / SNOMED CT 4175015336 / Confirmed  History of deep vein thrombosis / SNOMED CT 7424509233 / Confirmed  after TKR- post op  Hypertension / SNOMED CT 70505133 / Confirmed  Hypertension / SNOMED CT 30545297 / Confirmed  Hypothyroidism / SNOMED CT 32878025 / Confirmed  Hypothyroidism / SNOMED CT 08782090 / Confirmed  Encounter for echocardiogram before initiation of chemotherapy / SNOMED CT 028934090 / Confirmed,    Active Problems (14)  Anxiety   Arthropathy   Atherosclerotic heart disease of native coronary artery without angina pectoris   Breast cancer   CAD - Coronary artery disease   COPD - Chronic obstructive pulmonary disease   Dyslipidemia   Encounter for echocardiogram before initiation of chemotherapy   GERD - Gastro-esophageal reflux disease   History of deep vein thrombosis   Hypertension   Hypertension   Hypothyroidism   Hypothyroidism         Physical Examination   Vital Signs   11/10/2021 13:42 CST     Temperature Oral          36.4 DegC                             Temperature Oral (calculated)             97.52 DegF                             Peripheral Pulse Rate     68 bpm                             Respiratory Rate          18 br/min                             SpO2                      99 %                             Oxygen Therapy            Room air                             Systolic Blood Pressure   128 mmHg                             Diastolic Blood Pressure  77  mmHg                             Blood Pressure Location   Right arm                             Manual Cuff BP            No                             O2 SAT at rest            99 %     Measurements from flowsheet : Measurements   11/10/2021 13:42 CST     Weight Dosing             91.300 kg                             Weight Measured           91.3 kg                             Weight Measured and Calculated in Lbs     201.28 lb                             Height/Length Dosing      163.00 cm                             Height/Length Measured    163 cm                             BSA Measured              2.03 m2                             Body Mass Index Measured  34.36 kg/m2        No qualifying data available   General:  Alert and oriented, No acute distress.    Eye:  Pupils are equal, round and reactive to light, Extraocular movements are intact, Normal conjunctiva.    HENT:  Normocephalic, Oral mucosa is moist.    Neck:  Supple, Non-tender, No lymphadenopathy.    Respiratory:  Lungs are clear to auscultation, Respirations are non-labored, Breath sounds are equal.    Cardiovascular:  Normal rate, Regular rhythm, No edema.    Gastrointestinal:  Soft, Non-tender, Non-distended, Normal bowel sounds.    Integumentary:  Warm, Dry, Pink, Intact.    Neurologic:  Alert, Oriented, Normal sensory, No focal deficits.    Psychiatric:  Cooperative, Appropriate mood & affect.    Breast:  left breast with redness and tenderness .    Lymphatics:  No lymphadenopathy neck, axilla, groin.    Musculoskeletal:  Normal gait.    Cognition and Speech:  Oriented, Speech clear and coherent.    ECOG Performance Scale: 0 - Fully active; no performance restrictions.      Review / Management   Results review:  Last 10 Days Lab Results : Lab View   11/10/2021 13:22 CST     WBC                       7.4 x10(3)/mcL                             RBC                       4.48 x10(6)/mcL                             Hgb                        12.1 gm/dL                             Hct                       37.9 %                             Platelet                  212 x10(3)/mcL                             MCV                       84.6 fL                             MCH                       27.0 pg                             MCHC                      31.9 gm/dL  LOW                             RDW                       15.2 %                             MPV                       10.2 fL                             Abs Neut                  4.24 x10(3)/mcL                             NEUT%                     57.2 %  NA                             NEUT#                     4.2 x10(3)/mcL                             LYMPH%                    31.2 %  NA                             LYMPH#                    2.3 x10(3)/mcL                             MONO%                     8.6 %  NA                             MONO#                     0.6 x10(3)/mcL                             EOS%                      2.6 %  NA                             EOS#                      0.2 x10(3)/mcL                             BASO%                     0.3 %  NA                             BASO#                     0.0 x10(3)/mcL  .       Impression and Plan   Diagnoses:  1.  pT1c, N0, M0 stage IA triple positive invasive ductal carcinoma of the right breast.    Plan:  Patient has a stage IA triple positive breast cancer.  Due to HER-2 being positive, we treated with Taxol and Herceptin weekly for 12 weeks, completed on 8/19/2021.  She will then complete a years worth of Herceptin, started on 9/9/2021.  Radiation started on 9/2/2021, finished on 9/30/2021.      Echocardiogram completed on 4/14/2021 showed EF of 55-60%. Repeat ECHO on 8/31/2021 with EF 55-60%.    Baseline DEXA scan completed in 4/8/2021 showed mild osteopenia of the left and right femoral neck.    She was seen by Dr. Renee on 8/23/2021; completed radiation on 9/30/2021.    Started maintenance  Herceptin on 9/9/2021.  She will complete 1 year worth- OK to proceed with next cycle tomorrow as planned.  We discussed initiation of endocrine therapy at length today during patient's visit including medication, dosage and possible side effects- tentative plan to start at next clinic visit. Will hold off for now given her continued residual pain/swelling following radiation    Repeat ECHO due in 11/2021- orders written for     Return to clinic in 3 weeks  for TD visit and clinical examination    Labs: CBC, CMP    All questions were answered to the best of my ability.   Yesy URIOSTEGUI, FNP-C

## 2022-04-29 NOTE — PROGRESS NOTES
Patient:   Elina Paris            MRN: [id number]            FIN: [id number]               Age:   71 years     Sex:  Female     :  1950   Associated Diagnoses:   None   Author:   Munira Ferrer      Contact Information   Medical Oncologist: Osmar CASEY MD, Philippe E   Surgeon: Sandy BOCANEGRA, Marifer MCCULLOUGH   Additional Provider: Manuel Mclean MD      Background Information   Problem list:     All Problems  Anxiety / [id number] / Confirmed  Arthropathy / 7917692033 / Confirmed  Atherosclerotic heart disease of native coronary artery without angina pectoris / [id number] / Confirmed  Breast cancer / [id number] / Confirmed  CAD - Coronary artery disease / 1608869791 / Confirmed  COPD - Chronic obstructive pulmonary disease / 777140196 / Confirmed  Dyslipidemia / 2757146271 / Confirmed  GERD - Gastro-esophageal reflux disease / 0025527716 / Confirmed  History of deep vein thrombosis / 6953237612 / Confirmed  Hypertension / 94396630 / Confirmed  Hypertension / 41214411 / Confirmed  Hypothyroidism / 79109112 / Confirmed  Hypothyroidism / 89939894 / Confirmed  Encounter for echocardiogram before initiation of chemotherapy / 757361622 / Confirmed,    Active Problems (14)  Anxiety   Arthropathy   Atherosclerotic heart disease of native coronary artery without angina pectoris   Breast cancer   CAD - Coronary artery disease   COPD - Chronic obstructive pulmonary disease   Dyslipidemia   Encounter for echocardiogram before initiation of chemotherapy   GERD - Gastro-esophageal reflux disease   History of deep vein thrombosis   Hypertension   Hypertension   Hypothyroidism   Hypothyroidism   .    Cancer:   PT1c, N0, M0 stage IA triple positive invasive ductal carcinoma of the right breast.       Diagnostic mammogram and ultrasound of the left breast on 2021 was performed and showed a 1.6 cm irregular mass at the 3 o'clock position, a 1.0 cm oval mass with obscured margins at the 3 o'clock position  and a 0.6 cm irregular mass at the 2 o'clock position.  On ultrasound, the 3:00 lesion 8 cm from the nipple measured 1.6 x 1.2 x 1.2 cm.  This was irregular with angular/microlobulated margins.  There was also posterior shadowing.  The 3 o'clock position 3 cm from the nipple showed a 1.0 x 1.0 x 0.7 cm oval circumscribed mass with internal septation.  The 2 o'clock position lesion 5 cm from the nipple measured 0.6 x 0.5 x 0.7 cm, was avascular with peripheral hyper echogenicity and central hypoechogenicity and indistinct margins.  BI-RADS 4, biopsy recommended.  Patient underwent biopsy on 1/14/2021, the 3 o'clock position lesion 8 cm from the nipple revealed invasive ductal carcinoma.  The left breast 3 o'clock position 3 cm from the nipple returned as invasive ductal carcinoma.  The left breast lesion at the 2 o'clock position showed benign breast tissue.  Estrogen receptor was 99.74% positive, progesterone preceptor was 98.57% positive, HER-2 was 2+ by IHC and positive by FISH.  Patient then underwent bilateral breast MRI on 2/25/2021 which revealed biopsy-proven malignancy at the 3 o'clock position in the left breast spanning 5.7 cm without suspicious enhancement in either breast.  Patient then underwent a left sided lumpectomy on 3/8/2021, final pathology revealed a pT1c, N0, M0 stage IA triple positive invasive ductal carcinoma.  The patient subsequently had a bilateral breast reduction.  This was done by Dr. Manuel Mclean.   , New diagnosis, Date: 1/14/2021.    Staging:  T1 (c), N0, M0.         Stage: I (A).       Health Status   Allergies:    Allergic Reactions (Selected)  No Known Allergies   Current medications:  (Selected)   Inpatient Medications  Future  Benadryl 50mg/ml Inj: 25 mg, form: Injection, IV Piggyback, Once-chemo, Infuse over: 20 minute(s), *Est. first dose 05/13/21 8:40:00 CDT, *Est. stop date 05/13/21 8:40:00 CDT, Routine, Weeks 1, Future Order  Benadryl 50mg/ml Inj: 25 mg, form:  Injection, IV Piggyback, Once-chemo, Infuse over: 20 minute(s), *Est. first dose 05/20/21 8:40:00 CDT, *Est. stop date 05/20/21 8:40:00 CDT, Routine, Weeks 2, Future Order  Benadryl 50mg/ml Inj: 25 mg, form: Injection, IV Piggyback, Once-chemo, Infuse over: 20 minute(s), *Est. first dose 05/27/21 8:40:00 CDT, *Est. stop date 05/27/21 8:40:00 CDT, Routine, Weeks 3, Future Order  Heparin Flush 100 U/mL - 5 mL: 500 units, form: Injection, IV Push, Once-chemo, *Est. first dose 05/13/21 10:30:00 CDT, *Est. stop date 05/13/21 10:30:00 CDT, Routine, Weeks 1, Future Order  Heparin Flush 100 U/mL - 5 mL: 500 units, form: Injection, IV Push, Once-chemo, *Est. first dose 05/20/21 10:30:00 CDT, *Est. stop date 05/20/21 10:30:00 CDT, Routine, Weeks 2, Future Order  Heparin Flush 100 U/mL - 5 mL: 500 units, form: Injection, IV Push, Once-chemo, *Est. first dose 05/27/21 10:30:00 CDT, *Est. stop date 05/27/21 10:30:00 CDT, Routine, Weeks 3, Future Order  Herceptin (for IVPB): 189 mg, form: Infusion, IV Piggyback, Once-chemo, Infuse over: 60 minute(s), first dose 05/20/21 8:00:00 CDT, stop date 05/20/21 8:00:00 CDT, Future Order, Weeks 2  Herceptin (for IVPB): 189 mg, form: Infusion, IV Piggyback, Once-chemo, Infuse over: 60 minute(s), first dose 05/27/21 8:00:00 CDT, stop date 05/27/21 8:00:00 CDT, Future Order, Weeks 3  Herceptin (for IVPB): 399 mg, form: Infusion, IV Piggyback, Once-chemo, Infuse over: 90 minute(s), first dose 05/13/21 8:00:00 CDT, stop date 05/13/21 8:00:00 CDT, Future Order, Weeks 1  Pepcid (for IVPB): 20 mg, form: Infusion, IV Piggyback, Once-chemo, Infuse over: 20 minute(s), *Est. first dose 05/13/21 8:40:00 CDT, *Est. stop date 05/13/21 8:40:00 CDT, Future Order, Weeks 1  Pepcid (for IVPB): 20 mg, form: Infusion, IV Piggyback, Once-chemo, Infuse over: 20 minute(s), *Est. first dose 05/20/21 8:40:00 CDT, *Est. stop date 05/20/21 8:40:00 CDT, Future Order, Weeks 2  Pepcid (for IVPB): 20 mg, form: Infusion, IV  Piggyback, Once-chemo, Infuse over: 20 minute(s), *Est. first dose 05/27/21 8:40:00 CDT, *Est. stop date 05/27/21 8:40:00 CDT, Future Order, Weeks 3  Taxol (for IVPB): 162.4 mg, form: Infusion, IV Piggyback, Once-chemo, Infuse over: 1 hr, *Est. first dose 05/13/21 9:00:00 CDT, *Est. stop date 05/13/21 9:00:00 CDT, Future Order, Weeks 1  Taxol (for IVPB): 162.4 mg, form: Infusion, IV Piggyback, Once-chemo, Infuse over: 1 hr, *Est. first dose 05/20/21 9:00:00 CDT, *Est. stop date 05/20/21 9:00:00 CDT, Future Order, Weeks 2  Taxol (for IVPB): 162.4 mg, form: Infusion, IV Piggyback, Once-chemo, Infuse over: 1 hr, *Est. first dose 05/27/21 9:00:00 CDT, *Est. stop date 05/27/21 9:00:00 CDT, Future Order, Weeks 3  acetaminophen: 650 mg, form: Tab, Oral, Once-chemo, *Est. first dose 05/13/21 8:40:00 CDT, *Est. stop date 05/13/21 8:40:00 CDT, Routine, Weeks 1, Future Order  acetaminophen: 650 mg, form: Tab, Oral, Once-chemo, *Est. first dose 05/20/21 8:40:00 CDT, *Est. stop date 05/20/21 8:40:00 CDT, Routine, Weeks 2, Future Order  acetaminophen: 650 mg, form: Tab, Oral, Once-chemo, *Est. first dose 05/27/21 8:40:00 CDT, *Est. stop date 05/27/21 8:40:00 CDT, Routine, Weeks 3, Future Order  dexamethasone (for IVPB): 10 mg, form: Injection, IV Piggyback, Once-chemo, Infuse over: 20 minute(s), *Est. first dose 05/13/21 8:40:00 CDT, *Est. stop date 05/13/21 8:40:00 CDT, Future Order, Weeks 1  dexamethasone (for IVPB): 10 mg, form: Injection, IV Piggyback, Once-chemo, Infuse over: 20 minute(s), *Est. first dose 05/20/21 8:40:00 CDT, *Est. stop date 05/20/21 8:40:00 CDT, Future Order, Weeks 2  dexamethasone (for IVPB): 10 mg, form: Injection, IV Piggyback, Once-chemo, Infuse over: 20 minute(s), *Est. first dose 05/27/21 8:40:00 CDT, *Est. stop date 05/27/21 8:40:00 CDT, Future Order, Weeks 3  ondansetron (for IVPB): 16 mg, form: Injection, IV Piggyback, Once-chemo, Infuse over: 20 minute(s), first dose 05/13/21 8:40:00 CDT, stop  date 21 8:40:00 CDT, Routine, Weeks 1, Future Order  ondansetron (for IVPB): 16 mg, form: Injection, IV Piggyback, Once-chemo, Infuse over: 20 minute(s), first dose 21 8:40:00 CDT, stop date 21 8:40:00 CDT, Routine, Weeks 2, Future Order  ondansetron (for IVPB): 16 mg, form: Injection, IV Piggyback, Once-chemo, Infuse over: 20 minute(s), first dose 21 8:40:00 CDT, stop date 21 8:40:00 CDT, Routine, Weeks 3, Future Order  Pending Complete  midazolam: 2 mg, form: Injection, IV Push, q5min, Order duration: 2 dose(s), first dose 21 10:30:00 CST, stop date 21 10:39:00 CST, (up to 5 mg for moderate anxiety)  Documented Medications  Documented  ASPIRIN EC 81 MG TABLET: 81 mg = 1 tab(s), Oral, BID  Amoxil 500 mg Cap: 500 mg = 1 cap(s), Oral, BID  DOCUSATE SODIUM 100 MG SOFTGEL: 100 mg = 1 cap(s), Oral, TID  Diltiazem Hydrochloride  mg/24 hours oral capsule, extended release: 0 Refill(s)  Flonase 50 mcg/inh nasal spray: Daily, 0 Refill(s)  Linzess 290 mcg oral capsule: 290 mcg = 1 cap(s), Oral, Daily, # 30 cap(s), 0 Refill(s)  Trelegy Ellipta 100 mcg-62.5 mcg-25 mcg/inh inhalation powder: = 1 puff(s), INH, Daily  VENTOLIN HFA 90 MCG INHALER:   Xanax 0.25 mg oral tablet: 0.25 mg = 1 tab(s), Oral, Daily  acetaminophen-oxycodone 325 mg-5 mg oral tablet: 1 tab(s), Oral, q4-6hr  atorvastatin 40 mg oral tablet: 40 mg = 1 tab(s), Oral, Daily, # 30 tab(s), 0 Refill(s)  esomeprazole 40 mg oral DR capsule: 40 mg = 1 cap(s), Oral, Daily  hydrochlorothiazide-triamterene 25 mg-37.5 mg oral tablet: 1 tab(s), Oral, Daily, # 30 tab(s), 0 Refill(s)  levothyroxine 50 mcg (0.05 mg) oral tablet: 50 mcg = 1 tab(s), Oral, Daily, # 30 tab(s), 0 Refill(s)  nitroglycerin 0.4 mg sublingual TAB:   ondansetron 4 mg oral tablet, disintegratin mg = 1 tab(s), Oral, q4-6hr   Procedure history:    Catheter Insertion Mediport (Right) on 3/8/2021 at 71 Years.  Comments:  3/8/2021 16:48 RUBY - Clementina BLAIR, Oralia  RICHARD.  auto-populated from documented surgical case  Breast Reduction (Bilateral) on 3/8/2021 at 71 Years.  Comments:  3/8/2021 16:48 Oralia Edward RN.  auto-populated from documented surgical case  Partial Mastectomy W/[place] Node Bx (Left) on 3/8/2021 at 71 Years.  Comments:  3/8/2021 16:48 Oralia Edward RN.  auto-populated from documented surgical case  Colonoscopy on 7/6/2016 at 66 Years.  Comments:  7/6/2016 12:53 LIZETT Pack RN, Dino WATERS  auto-populated from documented surgical case  Cervical spinal fusion ([id number]) in 2001 at 51 Years.  Cervical spinal fusion ([id number]) in 1995 at 45 Years.  Partial hysterectomy (9381114339) in 1985 at 35 Years.  Cholecystectomy; (01457).  Tonsillectomy, primary or secondary; younger than age 12 (64405).  knee surgery.  sinus surgery.      Review of Systems   Constitutional:  Negative.    Ear/Nose/Mouth/Throat:  Negative.    Respiratory:  Negative.    Cardiovascular:  Right upper chest wall mediport, 2D Echocardfiogram done 4/14/2021 LVEF 55-60%.    Gastrointestinal:  Negative.    Genitourinary:  Negative.    Hematology/Lymphatics:  Negative.    Immunologic:  Negative.    Musculoskeletal:  Negative.    Integumentary:  Negative.    Immunologic:  Negative.    Immunologic:  Negative.    Neurologic:  Negative.    Psychiatric:  Negative.       Treatment   Chemotherapy Regimen: Trastuzumab loading dose 4mg/Kg 399mg IV then 2mg/Kg 189mg IV/Paclitaxel 80mg/m2 162mg IV weekly x 12 weeks then Trastuzumab IV every 3 weeks to complete a year:  Start date: 5/13/2021.         Clinical trial: No.         Intent of treatment: Adjuvant.         ECOG performance status: Start of treatment: 1.    Radiation:  Not planned.       Plan   Follow-up with medical oncology:  To see Osmar CASEY MD, Scout BUENO (RTC 5/13/2021 at 1000 cycle #1 of chemotherapy.).    Follow-up imaging & studies     2D Echocardfiogram done 4/14/2021 LVEF 55-60%.     Patient instructions:     Potential  "late effects of treatment Antiemetic regimen discussed and copy given.    Call your doctor if you have "When to call" instruction sheet and magnet discussed and copies given..    Information regarding his/her diagnosis Given.    Discussed Goals of Therapy Yes.    Discussed Premedication Yes.    Discussed Chemo Drugs Yes.    Discussed Chemo Regimen Yes.    Discussed short - and long-term adverse Effects Yes.    Discussed S/S to call MD/NP Yes.    Written material given to patient Given.    Questions answered Yes.    Verbalized Understanding Yes.    Referral provided:  Dietician, .    Counseled::  Patient, Family (DaughterChelsy).    Summary:  Prescription per Dr. Prasad called in to [facility] [address] : Zofran 8mg tab 1 PO every 8 hours PRN nausea. Dispense 30 refill 2.                                                                                      OTC: L-Glutamine powder 10Grams PO BID; Vitamin B Complex tab 1 PO Daily; Vitamin B6 250mg tab 1 PO Daily.                                                                                                                                     Start of visit: 1000 and visit completed 1055. I spent 55 minutes face to face with the patient and daughterChelsy...    "

## 2022-05-06 DIAGNOSIS — C50.919 MALIGNANT NEOPLASM OF FEMALE BREAST, UNSPECIFIED ESTROGEN RECEPTOR STATUS, UNSPECIFIED LATERALITY, UNSPECIFIED SITE OF BREAST: Primary | ICD-10-CM

## 2022-05-06 DIAGNOSIS — Z51.81 ENCOUNTER FOR MONITORING CARDIOTOXIC DRUG THERAPY: ICD-10-CM

## 2022-05-06 DIAGNOSIS — Z79.899 ENCOUNTER FOR MONITORING CARDIOTOXIC DRUG THERAPY: ICD-10-CM

## 2022-05-16 ENCOUNTER — OFFICE VISIT (OUTPATIENT)
Dept: HEMATOLOGY/ONCOLOGY | Facility: CLINIC | Age: 72
End: 2022-05-16
Payer: MEDICARE

## 2022-05-16 ENCOUNTER — LAB VISIT (OUTPATIENT)
Dept: LAB | Facility: HOSPITAL | Age: 72
End: 2022-05-16
Attending: INTERNAL MEDICINE
Payer: MEDICARE

## 2022-05-16 VITALS
DIASTOLIC BLOOD PRESSURE: 74 MMHG | HEIGHT: 64 IN | BODY MASS INDEX: 34.85 KG/M2 | RESPIRATION RATE: 18 BRPM | HEART RATE: 66 BPM | TEMPERATURE: 98 F | OXYGEN SATURATION: 97 % | WEIGHT: 204.13 LBS | SYSTOLIC BLOOD PRESSURE: 140 MMHG

## 2022-05-16 DIAGNOSIS — C50.919 MALIGNANT NEOPLASM OF FEMALE BREAST, UNSPECIFIED ESTROGEN RECEPTOR STATUS, UNSPECIFIED LATERALITY, UNSPECIFIED SITE OF BREAST: Primary | ICD-10-CM

## 2022-05-16 DIAGNOSIS — C50.919 MALIGNANT NEOPLASM OF FEMALE BREAST, UNSPECIFIED ESTROGEN RECEPTOR STATUS, UNSPECIFIED LATERALITY, UNSPECIFIED SITE OF BREAST: ICD-10-CM

## 2022-05-16 DIAGNOSIS — Z79.899 ENCOUNTER FOR MONITORING CARDIOTOXIC DRUG THERAPY: ICD-10-CM

## 2022-05-16 DIAGNOSIS — Z51.81 ENCOUNTER FOR MONITORING CARDIOTOXIC DRUG THERAPY: ICD-10-CM

## 2022-05-16 LAB
ANION GAP SERPL CALC-SCNC: 13 MMOL/L (ref 8–16)
BASOPHILS # BLD AUTO: 0.01 X10(3)/MCL (ref 0–0.2)
BASOPHILS NFR BLD AUTO: 0.1 %
BUN SERPL-MCNC: 22 MG/DL (ref 6–30)
CHLORIDE SERPL-SCNC: 106 MMOL/L (ref 95–110)
CREAT SERPL-MCNC: 1 MG/DL (ref 0.5–1.4)
EOSINOPHIL # BLD AUTO: 0.09 X10(3)/MCL (ref 0–0.9)
EOSINOPHIL NFR BLD AUTO: 0.8 %
ERYTHROCYTE [DISTWIDTH] IN BLOOD BY AUTOMATED COUNT: 14.9 % (ref 11.5–17)
GLUCOSE SERPL-MCNC: 105 MG/DL (ref 70–110)
HCT VFR BLD AUTO: 39.6 % (ref 37–47)
HCT VFR BLD CALC: 38 %PCV (ref 36–54)
HGB BLD-MCNC: 12.4 GM/DL (ref 12–16)
HGB BLD-MCNC: 13 G/DL
IMM GRANULOCYTES # BLD AUTO: 0.01 X10(3)/MCL (ref 0–0.02)
IMM GRANULOCYTES NFR BLD AUTO: 0.1 % (ref 0–0.43)
LYMPHOCYTES # BLD AUTO: 2.28 X10(3)/MCL (ref 0.6–4.6)
LYMPHOCYTES NFR BLD AUTO: 21.4 %
MCH RBC QN AUTO: 27.1 PG (ref 27–31)
MCHC RBC AUTO-ENTMCNC: 31.3 MG/DL (ref 33–36)
MCV RBC AUTO: 86.7 FL (ref 80–94)
MONOCYTES # BLD AUTO: 1.01 X10(3)/MCL (ref 0.1–1.3)
MONOCYTES NFR BLD AUTO: 9.5 %
NEUTROPHILS # BLD AUTO: 7.3 X10(3)/MCL (ref 2.1–9.2)
NEUTROPHILS NFR BLD AUTO: 68.1 %
PLATELET # BLD AUTO: 211 X10(3)/MCL (ref 130–400)
PMV BLD AUTO: 10.4 FL (ref 9.4–12.4)
POC IONIZED CALCIUM: 1.35 MMOL/L (ref 1.06–1.42)
POC TCO2 (MEASURED): 28 MMOL/L (ref 23–29)
POTASSIUM BLD-SCNC: 3.6 MMOL/L (ref 3.5–5.1)
RBC # BLD AUTO: 4.57 X10(6)/MCL (ref 4.2–5.4)
SAMPLE: NORMAL
SODIUM BLD-SCNC: 142 MMOL/L (ref 136–145)
WBC # SPEC AUTO: 10.7 X10(3)/MCL (ref 4.5–11.5)

## 2022-05-16 PROCEDURE — 1160F PR REVIEW ALL MEDS BY PRESCRIBER/CLIN PHARMACIST DOCUMENTED: ICD-10-PCS | Mod: CPTII,S$GLB,, | Performed by: INTERNAL MEDICINE

## 2022-05-16 PROCEDURE — 85025 COMPLETE CBC W/AUTO DIFF WBC: CPT

## 2022-05-16 PROCEDURE — 1101F PT FALLS ASSESS-DOCD LE1/YR: CPT | Mod: CPTII,S$GLB,, | Performed by: INTERNAL MEDICINE

## 2022-05-16 PROCEDURE — 99999 PR PBB SHADOW E&M-EST. PATIENT-LVL IV: ICD-10-PCS | Mod: PBBFAC,,, | Performed by: INTERNAL MEDICINE

## 2022-05-16 PROCEDURE — 3288F FALL RISK ASSESSMENT DOCD: CPT | Mod: CPTII,S$GLB,, | Performed by: INTERNAL MEDICINE

## 2022-05-16 PROCEDURE — 36415 COLL VENOUS BLD VENIPUNCTURE: CPT

## 2022-05-16 PROCEDURE — 1126F PR PAIN SEVERITY QUANTIFIED, NO PAIN PRESENT: ICD-10-PCS | Mod: CPTII,S$GLB,, | Performed by: INTERNAL MEDICINE

## 2022-05-16 PROCEDURE — 1160F RVW MEDS BY RX/DR IN RCRD: CPT | Mod: CPTII,S$GLB,, | Performed by: INTERNAL MEDICINE

## 2022-05-16 PROCEDURE — 3008F BODY MASS INDEX DOCD: CPT | Mod: CPTII,S$GLB,, | Performed by: INTERNAL MEDICINE

## 2022-05-16 PROCEDURE — 99999 PR PBB SHADOW E&M-EST. PATIENT-LVL IV: CPT | Mod: PBBFAC,,, | Performed by: INTERNAL MEDICINE

## 2022-05-16 PROCEDURE — 3077F SYST BP >= 140 MM HG: CPT | Mod: CPTII,S$GLB,, | Performed by: INTERNAL MEDICINE

## 2022-05-16 PROCEDURE — 3077F PR MOST RECENT SYSTOLIC BLOOD PRESSURE >= 140 MM HG: ICD-10-PCS | Mod: CPTII,S$GLB,, | Performed by: INTERNAL MEDICINE

## 2022-05-16 PROCEDURE — 1126F AMNT PAIN NOTED NONE PRSNT: CPT | Mod: CPTII,S$GLB,, | Performed by: INTERNAL MEDICINE

## 2022-05-16 PROCEDURE — 3008F PR BODY MASS INDEX (BMI) DOCUMENTED: ICD-10-PCS | Mod: CPTII,S$GLB,, | Performed by: INTERNAL MEDICINE

## 2022-05-16 PROCEDURE — 99214 OFFICE O/P EST MOD 30 MIN: CPT | Mod: S$GLB,,, | Performed by: INTERNAL MEDICINE

## 2022-05-16 PROCEDURE — 3078F PR MOST RECENT DIASTOLIC BLOOD PRESSURE < 80 MM HG: ICD-10-PCS | Mod: CPTII,S$GLB,, | Performed by: INTERNAL MEDICINE

## 2022-05-16 PROCEDURE — 3288F PR FALLS RISK ASSESSMENT DOCUMENTED: ICD-10-PCS | Mod: CPTII,S$GLB,, | Performed by: INTERNAL MEDICINE

## 2022-05-16 PROCEDURE — 1159F PR MEDICATION LIST DOCUMENTED IN MEDICAL RECORD: ICD-10-PCS | Mod: CPTII,S$GLB,, | Performed by: INTERNAL MEDICINE

## 2022-05-16 PROCEDURE — 99214 PR OFFICE/OUTPT VISIT, EST, LEVL IV, 30-39 MIN: ICD-10-PCS | Mod: S$GLB,,, | Performed by: INTERNAL MEDICINE

## 2022-05-16 PROCEDURE — 1101F PR PT FALLS ASSESS DOC 0-1 FALLS W/OUT INJ PAST YR: ICD-10-PCS | Mod: CPTII,S$GLB,, | Performed by: INTERNAL MEDICINE

## 2022-05-16 PROCEDURE — 3078F DIAST BP <80 MM HG: CPT | Mod: CPTII,S$GLB,, | Performed by: INTERNAL MEDICINE

## 2022-05-16 PROCEDURE — 1159F MED LIST DOCD IN RCRD: CPT | Mod: CPTII,S$GLB,, | Performed by: INTERNAL MEDICINE

## 2022-05-16 RX ORDER — DIPHENHYDRAMINE HYDROCHLORIDE 50 MG/ML
50 INJECTION INTRAMUSCULAR; INTRAVENOUS ONCE AS NEEDED
Status: CANCELLED | OUTPATIENT
Start: 2022-05-19

## 2022-05-16 RX ORDER — HEPARIN 100 UNIT/ML
500 SYRINGE INTRAVENOUS
Status: CANCELLED | OUTPATIENT
Start: 2022-05-19

## 2022-05-16 RX ORDER — DILTIAZEM HYDROCHLORIDE 300 MG/1
1 CAPSULE, EXTENDED RELEASE ORAL DAILY
COMMUNITY
Start: 2022-03-21

## 2022-05-16 RX ORDER — ACETAMINOPHEN 500 MG
500 TABLET ORAL EVERY 6 HOURS PRN
COMMUNITY

## 2022-05-16 RX ORDER — AMOXICILLIN 875 MG/1
875 TABLET, FILM COATED ORAL
COMMUNITY
Start: 2022-05-14 | End: 2022-05-24

## 2022-05-16 RX ORDER — FLUTICASONE FUROATE, UMECLIDINIUM BROMIDE AND VILANTEROL TRIFENATATE 100; 62.5; 25 UG/1; UG/1; UG/1
POWDER RESPIRATORY (INHALATION)
COMMUNITY
Start: 2022-04-28

## 2022-05-16 RX ORDER — TRIAMTERENE/HYDROCHLOROTHIAZID 37.5-25 MG
1 TABLET ORAL DAILY
COMMUNITY
Start: 2021-10-27

## 2022-05-16 RX ORDER — SODIUM CHLORIDE 0.9 % (FLUSH) 0.9 %
10 SYRINGE (ML) INJECTION
Status: CANCELLED | OUTPATIENT
Start: 2022-05-19

## 2022-05-16 RX ORDER — ACETAMINOPHEN 325 MG/1
650 TABLET ORAL
Status: CANCELLED
Start: 2022-05-19

## 2022-05-16 RX ORDER — NITROGLYCERIN 0.4 MG/1
0.4 TABLET SUBLINGUAL
COMMUNITY

## 2022-05-16 RX ORDER — ASPIRIN 81 MG/1
81 TABLET ORAL
COMMUNITY

## 2022-05-16 RX ORDER — ATORVASTATIN CALCIUM 40 MG/1
40 TABLET, FILM COATED ORAL DAILY
COMMUNITY
Start: 2022-04-26

## 2022-05-16 RX ORDER — ESOMEPRAZOLE MAGNESIUM 40 MG/1
40 CAPSULE, DELAYED RELEASE ORAL
COMMUNITY
End: 2022-12-07 | Stop reason: ALTCHOICE

## 2022-05-16 RX ORDER — LEVOTHYROXINE SODIUM 50 UG/1
1 TABLET ORAL DAILY
COMMUNITY
Start: 2022-05-09 | End: 2023-07-17 | Stop reason: DRUGHIGH

## 2022-05-16 RX ORDER — ALBUTEROL SULFATE 90 UG/1
AEROSOL, METERED RESPIRATORY (INHALATION)
COMMUNITY
Start: 2022-05-02

## 2022-05-16 RX ORDER — TRAMADOL HYDROCHLORIDE 50 MG/1
TABLET ORAL
COMMUNITY
Start: 2022-01-12

## 2022-05-16 RX ORDER — EPINEPHRINE 0.3 MG/.3ML
0.3 INJECTION SUBCUTANEOUS ONCE AS NEEDED
Status: CANCELLED | OUTPATIENT
Start: 2022-05-19

## 2022-05-16 RX ORDER — ESTRADIOL 2 MG/1
1 TABLET ORAL DAILY
COMMUNITY
Start: 2022-04-23 | End: 2022-06-27

## 2022-05-16 RX ORDER — DIPHENHYDRAMINE HYDROCHLORIDE 50 MG/ML
25 INJECTION INTRAMUSCULAR; INTRAVENOUS
Status: CANCELLED
Start: 2022-05-19

## 2022-05-16 RX ORDER — FLUTICASONE PROPIONATE 50 MCG
2 SPRAY, SUSPENSION (ML) NASAL
COMMUNITY
Start: 2022-05-14 | End: 2022-06-13

## 2022-05-16 NOTE — PROGRESS NOTES
Subjective:       Patient ID: Elina Paris is a 72 y.o. female.    Chief Complaint: Follow-up      Diagnosis:  1.  pT1c, N0, M0 stage IA triple positive invasive ductal carcinoma of the right breast.    Current Treatment:   1.  Weekly taxol and herceptin for 12 weeks followed by a year of maintenance herceptin.  Week 1 given on 5/13/2021, week 12 given on 8/19/2021.  Maintenance Herceptin started on 9/9/2021  2.  Adjuvant endocrine therapy started October 2021.  3.  Adjuvant radiation therapy to start on 9/2/2021, completed 9/30/2021.    Treatment History:  See above    HPI     Problem List:   Patient who underwent a screening mammogram on 12/9/2020 that showed multiple morphologically oval/circumscribed masses throughout both breasts.  This was read as BI-RADS 0, additional imaging recommended.  Diagnostic mammogram and ultrasound of the left breast on 1/4/2021 was performed and showed a 1.6 cm irregular mass at the 3 o'clock position, a 1.0 cm oval mass with obscured margins at the 3 o'clock position and a 0.6 cm irregular mass at the 2 o'clock position.  On ultrasound, the 3:00 lesion 8 cm from the nipple measured 1.6 x 1.2 x 1.2 cm.  This was irregular with angular/microlobulated margins.  There was also posterior shadowing.  The 3 o'clock position 3 cm from the nipple showed a 1.0 x 1.0 x 0.7 cm oval circumscribed mass with internal septation.  The 2 o'clock position lesion 5 cm from the nipple measured 0.6 x 0.5 x 0.7 cm, was avascular with peripheral hyper echogenicity and central hypoechogenicity and indistinct margins.  BI-RADS 4, biopsy recommended.  Patient underwent biopsy on 1/14/2021, the 3 o'clock position lesion 8 cm from the nipple revealed invasive ductal carcinoma.  The left breast 3 o'clock position 3 cm from the nipple returned as invasive ductal carcinoma.  The left breast lesion at the 2 o'clock position showed benign breast tissue.  Estrogen receptor was 99.74% positive, progesterone  preceptor was 98.57% positive, HER-2 was 2+ by IHC and positive by FISH.  Patient then underwent bilateral breast MRI on 2/25/2021 which revealed biopsy-proven malignancy at the 3 o'clock position in the left breast spanning 5.7 cm without suspicious enhancement in either breast.  Patient then underwent a left sided lumpectomy on 3/8/2021, final pathology revealed a pT1c, N0, M0 stage IA triple positive invasive ductal carcinoma.  The patient subsequently had a bilateral breast reduction.  This was done by Dr. Manuel Mclean.  She was referred to medical oncology.  I saw the patient on 3/31/2021.  Baseline DEXA scan completed in 4/8/2021 showed mild osteopenia in the left and right femoral necks.  Echocardiogram completed on 4/14/2021 showed EF of 55-60%.  Taxol and herceptin started on 5/13/2021, week 12 given on 8/19/2021.  Repeat ECHO on 8/31/2021 showed EF of 55-60%.  Radiation started on 9/2/2021.  Started maintenance Herceptin on 9/9/2021.  Completed radiation on 9/30/2021.  Started adjuvant endocrine therapy in October 2021.  Repeat ECHOs have been done on a regular basis and showed no true decrease in ejection fraction, most recently on 2/25/2022 with ejection fraction of 55%.              Interval History:  Patient presents to clinic for scheduled follow up appointment.  She remains on maintenance Herceptin and continues to tolerate it well her last dose of maintenance Herceptin will be Thursday, 05/19/2022.  She is feeling good and denies any new complaints.       Past Medical History:   Diagnosis Date    Anxiety     Breast cancer     CAD (coronary artery disease)     COPD (chronic obstructive pulmonary disease)     GERD (gastroesophageal reflux disease)     HLD (hyperlipidemia)     Hypertension     Hypothyroidism       Past Surgical History:   Procedure Laterality Date    CHOLECYSTECTOMY      COLONOSCOPY  07/06/2016    HYSTERECTOMY      MASTECTOMY, PARTIAL  03/08/2021    MEDIPORT INSERTION,  SINGLE  08/2021    REDUCTION OF BOTH BREASTS Bilateral 03/08/2021    TONSILLECTOMY       Social History     Socioeconomic History    Marital status:    Tobacco Use    Smoking status: Former Smoker     Types: Cigarettes    Smokeless tobacco: Never Used   Substance and Sexual Activity    Alcohol use: Never    Drug use: Never      Family History   Problem Relation Age of Onset    Cancer Mother     Coronary artery disease Mother     Hypertension Mother     Hypertension Father     Coronary artery disease Father     Cancer Brother     Hypertension Brother       Review of patient's allergies indicates:  No Known Allergies   Review of Systems   Constitutional: Negative for chills, diaphoresis, fatigue, fever and unexpected weight change.   HENT: Negative for nasal congestion, mouth sores, sinus pressure/congestion and sore throat.    Eyes: Negative for pain and visual disturbance.   Respiratory: Negative for cough, chest tightness and shortness of breath.    Cardiovascular: Negative for chest pain, palpitations and leg swelling.   Gastrointestinal: Negative for abdominal distention, abdominal pain, blood in stool, constipation and diarrhea.   Genitourinary: Negative for dysuria, frequency and hematuria.   Musculoskeletal: Negative for arthralgias and back pain.   Integumentary:  Negative for rash.   Neurological: Negative for dizziness, weakness, numbness and headaches.   Hematological: Negative for adenopathy.   Psychiatric/Behavioral: Negative for confusion.         Objective:      Physical Exam  Vitals reviewed. Exam conducted with a chaperone present.   Constitutional:       General: She is not in acute distress.     Appearance: Normal appearance.   HENT:      Head: Normocephalic and atraumatic.      Nose: Nose normal.      Mouth/Throat:      Mouth: Mucous membranes are moist.   Eyes:      Extraocular Movements: Extraocular movements intact.      Conjunctiva/sclera: Conjunctivae normal.    Cardiovascular:      Rate and Rhythm: Normal rate and regular rhythm.      Pulses: Normal pulses.      Heart sounds: Normal heart sounds.   Pulmonary:      Effort: Pulmonary effort is normal.      Breath sounds: Normal breath sounds.   Abdominal:      General: Bowel sounds are normal.      Palpations: Abdomen is soft.   Musculoskeletal:         General: No swelling. Normal range of motion.      Cervical back: Normal range of motion and neck supple.      Right lower leg: No edema.      Left lower leg: No edema.   Lymphadenopathy:      Cervical: No cervical adenopathy.   Skin:     General: Skin is warm and dry.   Neurological:      General: No focal deficit present.      Mental Status: She is alert and oriented to person, place, and time. Mental status is at baseline.   Psychiatric:         Mood and Affect: Mood normal.         Behavior: Behavior normal.         LABS AND IMAGING REVIEWED IN EPIC          Assessment:     1.  pT1c, N0, M0 stage IA triple positive invasive ductal carcinoma of the right breast.        Plan:         Treated with Taxol and Herceptin weekly for 12 weeks, completed on 8/19/2021.  Radiation started on 9/2/2021, finished on 9/30/2021.       Continue endocrine therapy as prescribed, started October 2021. Will complete maintenance Herceptin on 5/19/2022.    Echocardiograms have been done every 3 months, most recently on 2/25/2022 with ejection fraction of 55%.    Baseline DEXA scan completed in 4/8/2021 showed mild osteopenia of the left and right femoral neck.    Started maintenance Herceptin on 9/9/2021.  She will complete 1 year worth in May 2022.      CBC, CMP, CEA, CA 15-3, and Echocardiogram prior to follow up (pt will get Echo with her Cardiologist)     Return to clinic in 6 weeks to review results      Scout Prasad II, MD I, Jeniffer Giraldo LPN, acted solely as a scribe for and in the presence of, Dr. Scout Prasad who performed the service.

## 2022-05-17 ENCOUNTER — OFFICE VISIT (OUTPATIENT)
Dept: SURGERY | Facility: CLINIC | Age: 72
End: 2022-05-17
Attending: INTERNAL MEDICINE
Payer: MEDICARE

## 2022-05-17 VITALS
HEIGHT: 61 IN | HEART RATE: 69 BPM | RESPIRATION RATE: 20 BRPM | SYSTOLIC BLOOD PRESSURE: 167 MMHG | DIASTOLIC BLOOD PRESSURE: 83 MMHG | BODY MASS INDEX: 38.51 KG/M2 | TEMPERATURE: 99 F | WEIGHT: 204 LBS | OXYGEN SATURATION: 97 %

## 2022-05-17 DIAGNOSIS — Z85.3 ENCOUNTER FOR FOLLOW-UP SURVEILLANCE OF BREAST CANCER: Primary | ICD-10-CM

## 2022-05-17 DIAGNOSIS — Z90.12 HISTORY OF PARTIAL MASTECTOMY OF LEFT BREAST: ICD-10-CM

## 2022-05-17 DIAGNOSIS — Z08 ENCOUNTER FOR FOLLOW-UP SURVEILLANCE OF BREAST CANCER: Primary | ICD-10-CM

## 2022-05-17 DIAGNOSIS — N64.4 MASTALGIA: ICD-10-CM

## 2022-05-17 PROCEDURE — 99214 OFFICE O/P EST MOD 30 MIN: CPT | Mod: S$GLB,,, | Performed by: PHYSICIAN ASSISTANT

## 2022-05-17 PROCEDURE — 3079F DIAST BP 80-89 MM HG: CPT | Mod: CPTII,S$GLB,, | Performed by: PHYSICIAN ASSISTANT

## 2022-05-17 PROCEDURE — 1126F AMNT PAIN NOTED NONE PRSNT: CPT | Mod: CPTII,S$GLB,, | Performed by: PHYSICIAN ASSISTANT

## 2022-05-17 PROCEDURE — 3008F PR BODY MASS INDEX (BMI) DOCUMENTED: ICD-10-PCS | Mod: CPTII,S$GLB,, | Performed by: PHYSICIAN ASSISTANT

## 2022-05-17 PROCEDURE — 3077F SYST BP >= 140 MM HG: CPT | Mod: CPTII,S$GLB,, | Performed by: PHYSICIAN ASSISTANT

## 2022-05-17 PROCEDURE — 99999 PR PBB SHADOW E&M-EST. PATIENT-LVL III: CPT | Mod: PBBFAC,,, | Performed by: PHYSICIAN ASSISTANT

## 2022-05-17 PROCEDURE — 99999 PR PBB SHADOW E&M-EST. PATIENT-LVL III: ICD-10-PCS | Mod: PBBFAC,,, | Performed by: PHYSICIAN ASSISTANT

## 2022-05-17 PROCEDURE — 1159F PR MEDICATION LIST DOCUMENTED IN MEDICAL RECORD: ICD-10-PCS | Mod: CPTII,S$GLB,, | Performed by: PHYSICIAN ASSISTANT

## 2022-05-17 PROCEDURE — 3079F PR MOST RECENT DIASTOLIC BLOOD PRESSURE 80-89 MM HG: ICD-10-PCS | Mod: CPTII,S$GLB,, | Performed by: PHYSICIAN ASSISTANT

## 2022-05-17 PROCEDURE — 3077F PR MOST RECENT SYSTOLIC BLOOD PRESSURE >= 140 MM HG: ICD-10-PCS | Mod: CPTII,S$GLB,, | Performed by: PHYSICIAN ASSISTANT

## 2022-05-17 PROCEDURE — 3008F BODY MASS INDEX DOCD: CPT | Mod: CPTII,S$GLB,, | Performed by: PHYSICIAN ASSISTANT

## 2022-05-17 PROCEDURE — 1126F PR PAIN SEVERITY QUANTIFIED, NO PAIN PRESENT: ICD-10-PCS | Mod: CPTII,S$GLB,, | Performed by: PHYSICIAN ASSISTANT

## 2022-05-17 PROCEDURE — 1160F RVW MEDS BY RX/DR IN RCRD: CPT | Mod: CPTII,S$GLB,, | Performed by: PHYSICIAN ASSISTANT

## 2022-05-17 PROCEDURE — 1160F PR REVIEW ALL MEDS BY PRESCRIBER/CLIN PHARMACIST DOCUMENTED: ICD-10-PCS | Mod: CPTII,S$GLB,, | Performed by: PHYSICIAN ASSISTANT

## 2022-05-17 PROCEDURE — 1159F MED LIST DOCD IN RCRD: CPT | Mod: CPTII,S$GLB,, | Performed by: PHYSICIAN ASSISTANT

## 2022-05-17 PROCEDURE — 99214 PR OFFICE/OUTPT VISIT, EST, LEVL IV, 30-39 MIN: ICD-10-PCS | Mod: S$GLB,,, | Performed by: PHYSICIAN ASSISTANT

## 2022-05-17 NOTE — PROGRESS NOTES
[facility] Breast Surg  Breast Surgical Oncology  Follow-Up Patient Office Visit       Referring Provider: Dr. Omega Lepe   PCP: Darron Hewitt Jr   Care Team:   Medical Oncologist: Dr. Scout Prasad  Radiation Oncologist: Dr. Jessica Renee      Chief Complaint:   Chief Complaint   Patient presents with    Follow-up    Breast Cancer     Personal history of breast cancer        Subjective:   Treatment History:  1. Left lumpectomy with left SLNB with bilateral breast reduction on 3/8/2021.  2. Taxol and Herceptin x's 12 weeks, completed 8/19/2021.  3. Maintenance Herceptin x's 1 year started 9/9/2021 - 5/19/2022  4. Adjuvant Radiation 9/2/2021 - 9/30/2021      Interval History:  5/17/2022 - Elina Paris returns today for 6 month follow up and clinical breast exam. She is doing well and finishes Herceptin this week! She does note that she was diagnosed with a sinus infection last week which is improving with antibiotics. She is otherwise doing well. Her breasts are still sore (L>R) since surgery and radiation and she feels little improvement from Ibuprofen or Voltaren gel. She does not drink much caffeine (a cold drink every other day), she usually does not wear bras.      HPI:  Elina Paris is a pleasant 70 Years old Female who presents with AJCC 8th ed clinical anatomic stage IA / prognostic stage IA (cT1c cN0 M0) multifocal Left invasive ductal carcinoma grade 1 at 3 o'clock 8 cm FN ER 99.74%, CA 98.57%, HER2 2 - equivocal on IHC/FISH positive and left invasive ductal carcinoma, grade 2 at 3 o'clock 3 cm FN ER 98.93%, CA 0.0%, HER2 2- equivocal on IHC/FISH positive. She is SP Left Lumpectomy with SLNB on 3/8/2021. Final pathology revealed left breast invasive ductal carcinoma , grade 1 with one foci measuring 1.8 cm and another foci measuring 2.0 cm with associated foci of DCIS. All margins were clear. Two sentinel lymph nodes negative for metastatic  carcinoma. The patient subsequently had a bilateral breast reduction. Due to HER-2 being positive, we treated with Taxol and Herceptin weekly for 12 weeks, completed on 2021. She will then complete a years worth of Herceptin, started on 2021. Radiation started on 2021, finished on 2021.      Imagin. 2020 BL SC MG at [facility] - which revealed multiple morphologically oval/circumscribed masses throughout both breasts which have changed in size or resolved after review of prior images, in the left breast UOQ mid depth and UOQ anterior depth two focal asymmetries are seen, and a left breast asymmetry anterior and close to the skin. No other significant masses, calcifications, or other findings in either breast. BI-RADS 0: additional imaging is recommended.  2. 21 LEFT DG MG / US LEFT BREAST LIMITED at [facility] - which revealed on L MG at 3 o'clock mid depth a 1.6 cm irregular mass, at 3 o'clock anterior depth 1.0 cm oval mass with obscured margins, and at 2 o'clock anterior depth a 0.6 cm irregular mass. On L US at 3 o'clock 8 cm FN a 1.6 cm x 1.2 cm x 1.2 cm irregular mass with angular.microlobulated margins/posterior shadowing, 3 o'clock 3 cm FN a 1.0 cm x 1.0 cm x 0.7 cm oval circumscribed mass with internal septation (consisted with complicated cyst), and 2 o'clock 5 cm FN a 0.6 cm x 0.5 cm x 0.7 cm avascular with peripheral hyperechogenicity and central hypoechogenicity and indistinct margins.  BI-RADS 4: suspicious and biopsy was recommended for 3 o'clock 8 cm FN mass and 2 o'clock 5 cm FN. US-guided aspiration of 3 o'clock 3 cm FN cyst  3. 2021 BL Breast MRI at [facility] - which revealed biopsy proven malignancy at 3 o'clock in the left breast spanning 5.7 cm without suspicious enhancement in either breast. BI-RADS 6: biopsy proven malignancy.  3. 2021 BL DG MG - BENIGN: no mammographic evidence of malignancy. Benign post surgical changes are present bilaterally.        Pathology:  1. Ultrasound-guided Core Biopsy 3 o'clock 8 cm FN - Invasive ductal carcinoma, grade 1  ER 99.74%  VA 98.57%  HER2 SOFIA 2 + equivocal on IHC/FISH positive  2. Ultrasound-guided Core Biopsy 3 o'clock 3 cm FN - Invasive ductal carcinoma, grade 2  ER 98.93%  VA 0.0%  HER2 SOFIA 2 + equivocal on IHC/FISH positive  3. Ultrasound-guided Core Biopsy 2 o'clock 5 cm FN - Fragments of benign breast tissue and adipose tissue  4. 3/8/2021 Left Lumpectomy with Left [place] Lymph Node Biopsy revealed left breast invasive ductal carcinoma , grade 1 with one foci measuring 1.8 cm and another foci measuring 2.0 cm with associated foci of DCIS. All margins were clear. Two sentinel lymph nodes negative for metastatic carcinoma.       OB/GYN History:  Menarche Onset: 11  Menopause: Post  Hormonal birth control (duration): no  Pregnancies: none  Age at first pregnancy: n/a  Child births: 0  Breastfeeding duration: no   Hysterectomy: yes  Oophorectomy: ?  HRT: no    Other:  MG breast density: Category B (Scattered fibroglandular)  Prior thoracic RT: none prior to breast cancer dx  Genetic testing: none  Ashkenazi Confucianist descent: No      Family History:  Family History   Problem Relation Age of Onset    Cancer Mother 68    Coronary artery disease Mother     Hypertension Mother     Hypertension Father     Coronary artery disease Father     Cancer Brother     Hypertension Brother         Patient History:  Past Medical History:   Diagnosis Date    Anxiety     Breast cancer     CAD (coronary artery disease)     COPD (chronic obstructive pulmonary disease)     GERD (gastroesophageal reflux disease)     HLD (hyperlipidemia)     Hypertension     Hypothyroidism        Past Surgical History:   Procedure Laterality Date    CHOLECYSTECTOMY      COLONOSCOPY  07/06/2016    HYSTERECTOMY      MASTECTOMY, PARTIAL  03/08/2021    MEDIPORT INSERTION, SINGLE  08/2021    REDUCTION OF BOTH BREASTS Bilateral 03/08/2021     TONSILLECTOMY         Social History     Socioeconomic History    Marital status:    Tobacco Use    Smoking status: Former Smoker     Types: Cigarettes    Smokeless tobacco: Never Used   Substance and Sexual Activity    Alcohol use: Never    Drug use: Never       Immunization History   Administered Date(s) Administered    COVID-19, MRNA, LN-S, PF (Pfizer) (Purple Cap) 01/13/2021, 02/03/2021       Medications/Allergies:  Current Outpatient Medications on File Prior to Visit   Medication Sig Dispense Refill    acetaminophen (TYLENOL) 500 MG tablet Take 500 mg by mouth every 6 (six) hours as needed.      albuterol (PROVENTIL/VENTOLIN HFA) 90 mcg/actuation inhaler INHALE 2 PUFFS INTO THE LUNGS EVERY 6 HOURS AS NEEDED FOR WHEEZE      amoxicillin (AMOXIL) 875 MG tablet Take 875 mg by mouth.      aspirin (ECOTRIN) 81 MG EC tablet Take 81 mg by mouth.      atorvastatin (LIPITOR) 40 MG tablet Take 40 mg by mouth once daily.      diltiaZEM HCl (TIAZAC) 300 mg 24 hr capsule Take 1 capsule by mouth once daily.      esomeprazole (NEXIUM) 40 MG capsule Take 40 mg by mouth as needed.      estradioL (ESTRACE) 2 MG tablet Take 1 mg by mouth once daily.      fluticasone propionate (FLONASE) 50 mcg/actuation nasal spray 2 sprays by Nasal route.      levothyroxine (SYNTHROID) 50 MCG tablet Take 1 tablet by mouth once daily.      nitroGLYCERIN (NITROSTAT) 0.4 MG SL tablet Place 0.4 mg under the tongue.      traMADoL (ULTRAM) 50 mg tablet TAKE 1 TABLET BY MOUTH EVERY 12 HOURS AS NEEDED FOR PAIN      TRELEGY ELLIPTA 100-62.5-25 mcg DsDv TAKE 1 PUFF BY MOUTH EVERY DAY      triamterene-hydrochlorothiazide 37.5-25 mg (MAXZIDE-25) 37.5-25 mg per tablet Take 1 tablet by mouth once daily.       No current facility-administered medications on file prior to visit.       Review of patient's allergies indicates:  No Known Allergies    Review of Systems:  Pertinent items are noted in HPI.     Objective:      Vitals:  Vitals:    05/17/22 0859   BP: (!) 167/83   Pulse: 69   Resp: 20   Temp: 98.5 °F (36.9 °C)       Body mass index is 38.55 kg/m².     Physical Exam:  General: The patient is awake, alert and oriented times three. The patient is well nourished and in no acute distress.  Neck: There is no evidence of palpable cervical, supraclavicular or axillary adenopathy. The neck is supple. The thyroid is not enlarged.  Musculoskeletal: The patient has a normal range of motion of her bilateral upper extremities.  Chest: Examination of the chest wall fails to reveal any obvious abnormalities. Nonlabored breathing, symmetric expansion.  Breast:  Right: There are well healed incisions from her breast reduction. There is mild diffuse tenderness to the breast. Examination of right breast fails to reveal any dominant masses or areas of significant focal nodularity. The nipple is everted without evidence of discharge. There is no skin dimpling with movement of the pectoralis. There are no significant skin changes overlying the breast.   Left: There are well healed incisions from her breast reduction. There is hyperpigmentation and residual redness to the breast from radiation treatments and also skin changes to the areola from radiation. There is moderate diffuse tenderness to the breast.  Examination of the left breast fails to reveal any dominant masses or areas of significant focal nodularity. The nipple is everted without evidence of discharge. There is no skin dimpling with movement of the pectoralis. There are no significant skin changes overlying the breast.  Abdomen: The abdomen is soft, flat, nontender and nondistended.  Integumentary: no rashes or skin lesions present  Neurologic: cranial nerves intact, no signs of peripheral neurological deficit, motor/sensory function intact      Assessment and Plan:       Elina was seen today for follow-up and breast cancer.    Diagnoses and all orders for this  visit:    Encounter for follow-up surveillance of breast cancer    History of partial mastectomy of left breast    Mastalgia          Plan:     1. I discussed that her mastalgia is consistent with pain from surgical and radiation changes. I discussed ways to reduce breast pain/tenderness.  I advised her to wear a good, supportive sports bra both day and night when symptoms are worse. Avoid caffeine (coffee, teas, chocolate, sodas). She may continue Voltaren gel and Ibuprofen/Tylenol as needed for breast pain.     2. Congratulated patient on her last round of Herceptin! I discussed that after her next mammogram, we can discuss when to remove her Mediport.    3. BL DG MG is already scheduled for November. RTC after MG for 6 month follow up.    4. Healthy lifestyle guidelines were reviewed. She was encouraged to engage in regular exercise, maintain a healthy body weight, and avoid excessive alcohol consumption. Healthy nutritional guidelines were also discussed. Self-breast examination was reviewed with the patient in detail and she was encouraged to perform this on a monthly basis.    5. Patient has not taken her BP medication today and her BP is elevated. She was advised to take this when she gets home.        All of her questions were answered.     Nadeen Philip PA-C            Total time on the date of the visit ranged from 30-39 mins ([id number]). Total time includes both face-to-face and non-face-to-face time personally spent by myself on the day of the visit.    Non-face-to-face time included:  _X_ preparing to see the patient such as reviewing the patient record  __ obtaining and reviewing separately obtained history  _X_ independently interpreting results  _X_ documenting clinical information in electronic health record.    Face-to-face time included:  _X_ performing an appropriate history and examination  _X_ communicating results to the patient  _X_ counseling and educating the patient  __ ordering  appropriate medications  __ ordering appropriate tests  _X_ ordering appropriate procedures (including follow-up)  _X_ answering any questions the patient had    Total Time spent on date of visit: 35 minutes

## 2022-05-19 ENCOUNTER — INFUSION (OUTPATIENT)
Dept: INFUSION THERAPY | Facility: HOSPITAL | Age: 72
End: 2022-05-19
Attending: INTERNAL MEDICINE
Payer: MEDICARE

## 2022-05-19 VITALS
SYSTOLIC BLOOD PRESSURE: 159 MMHG | HEART RATE: 65 BPM | TEMPERATURE: 99 F | DIASTOLIC BLOOD PRESSURE: 84 MMHG | RESPIRATION RATE: 16 BRPM | OXYGEN SATURATION: 98 %

## 2022-05-19 DIAGNOSIS — C50.919 MALIGNANT NEOPLASM OF FEMALE BREAST, UNSPECIFIED ESTROGEN RECEPTOR STATUS, UNSPECIFIED LATERALITY, UNSPECIFIED SITE OF BREAST: Primary | ICD-10-CM

## 2022-05-19 PROCEDURE — 25000003 PHARM REV CODE 250: Performed by: INTERNAL MEDICINE

## 2022-05-19 PROCEDURE — 63600175 PHARM REV CODE 636 W HCPCS: Performed by: INTERNAL MEDICINE

## 2022-05-19 PROCEDURE — 96365 THER/PROPH/DIAG IV INF INIT: CPT

## 2022-05-19 PROCEDURE — 96375 TX/PRO/DX INJ NEW DRUG ADDON: CPT

## 2022-05-19 RX ORDER — SODIUM CHLORIDE 0.9 % (FLUSH) 0.9 %
10 SYRINGE (ML) INJECTION
Status: DISCONTINUED | OUTPATIENT
Start: 2022-05-19 | End: 2022-05-19 | Stop reason: HOSPADM

## 2022-05-19 RX ORDER — DIPHENHYDRAMINE HYDROCHLORIDE 50 MG/ML
25 INJECTION INTRAMUSCULAR; INTRAVENOUS
Status: COMPLETED | OUTPATIENT
Start: 2022-05-19 | End: 2022-05-19

## 2022-05-19 RX ORDER — HEPARIN 100 UNIT/ML
500 SYRINGE INTRAVENOUS
Status: DISCONTINUED | OUTPATIENT
Start: 2022-05-19 | End: 2022-05-19 | Stop reason: HOSPADM

## 2022-05-19 RX ORDER — DIPHENHYDRAMINE HYDROCHLORIDE 50 MG/ML
50 INJECTION INTRAMUSCULAR; INTRAVENOUS ONCE AS NEEDED
Status: DISCONTINUED | OUTPATIENT
Start: 2022-05-19 | End: 2022-05-19 | Stop reason: HOSPADM

## 2022-05-19 RX ORDER — ACETAMINOPHEN 325 MG/1
650 TABLET ORAL
Status: DISCONTINUED | OUTPATIENT
Start: 2022-05-19 | End: 2022-05-19 | Stop reason: HOSPADM

## 2022-05-19 RX ORDER — EPINEPHRINE 0.3 MG/.3ML
0.3 INJECTION SUBCUTANEOUS ONCE AS NEEDED
Status: DISCONTINUED | OUTPATIENT
Start: 2022-05-19 | End: 2022-05-19 | Stop reason: HOSPADM

## 2022-05-19 RX ADMIN — HEPARIN 500 UNITS: 100 SYRINGE at 01:05

## 2022-05-19 RX ADMIN — DIPHENHYDRAMINE HYDROCHLORIDE 25 MG: 50 INJECTION INTRAMUSCULAR; INTRAVENOUS at 01:05

## 2022-05-19 RX ADMIN — TRASTUZUMAB 556 MG: 150 INJECTION, POWDER, LYOPHILIZED, FOR SOLUTION INTRAVENOUS at 01:05

## 2022-06-23 ENCOUNTER — LAB VISIT (OUTPATIENT)
Dept: LAB | Facility: HOSPITAL | Age: 72
End: 2022-06-23
Attending: INTERNAL MEDICINE
Payer: MEDICARE

## 2022-06-23 DIAGNOSIS — C50.919 MALIGNANT NEOPLASM OF FEMALE BREAST, UNSPECIFIED ESTROGEN RECEPTOR STATUS, UNSPECIFIED LATERALITY, UNSPECIFIED SITE OF BREAST: ICD-10-CM

## 2022-06-23 DIAGNOSIS — C50.919 MALIGNANT NEOPLASM OF FEMALE BREAST, UNSPECIFIED ESTROGEN RECEPTOR STATUS, UNSPECIFIED LATERALITY, UNSPECIFIED SITE OF BREAST: Primary | ICD-10-CM

## 2022-06-23 LAB
ALBUMIN SERPL-MCNC: 3.4 GM/DL (ref 3.4–4.8)
ALBUMIN/GLOB SERPL: 1.1 RATIO (ref 1.1–2)
ALP SERPL-CCNC: 96 UNIT/L (ref 40–150)
ALT SERPL-CCNC: 16 UNIT/L (ref 0–55)
AST SERPL-CCNC: 18 UNIT/L (ref 5–34)
BASOPHILS # BLD AUTO: 0.02 X10(3)/MCL (ref 0–0.2)
BASOPHILS NFR BLD AUTO: 0.3 %
BILIRUBIN DIRECT+TOT PNL SERPL-MCNC: 0.5 MG/DL
BUN SERPL-MCNC: 23.1 MG/DL (ref 9.8–20.1)
CALCIUM SERPL-MCNC: 9.3 MG/DL (ref 8.4–10.2)
CEA SERPL-MCNC: <1.73 NG/ML (ref 0–3)
CHLORIDE SERPL-SCNC: 108 MMOL/L (ref 98–107)
CO2 SERPL-SCNC: 26 MMOL/L (ref 23–31)
CREAT SERPL-MCNC: 1.12 MG/DL (ref 0.55–1.02)
EOSINOPHIL # BLD AUTO: 0.17 X10(3)/MCL (ref 0–0.9)
EOSINOPHIL NFR BLD AUTO: 2.6 %
ERYTHROCYTE [DISTWIDTH] IN BLOOD BY AUTOMATED COUNT: 14.4 % (ref 11.5–17)
GLOBULIN SER-MCNC: 3 GM/DL (ref 2.4–3.5)
GLUCOSE SERPL-MCNC: 179 MG/DL (ref 82–115)
HCT VFR BLD AUTO: 38.3 % (ref 37–47)
HGB BLD-MCNC: 12.2 GM/DL (ref 12–16)
IMM GRANULOCYTES # BLD AUTO: 0.01 X10(3)/MCL (ref 0–0.02)
IMM GRANULOCYTES NFR BLD AUTO: 0.2 % (ref 0–0.43)
LYMPHOCYTES # BLD AUTO: 2.35 X10(3)/MCL (ref 0.6–4.6)
LYMPHOCYTES NFR BLD AUTO: 36.5 %
MCH RBC QN AUTO: 26.9 PG (ref 27–31)
MCHC RBC AUTO-ENTMCNC: 31.9 MG/DL (ref 33–36)
MCV RBC AUTO: 84.4 FL (ref 80–94)
MONOCYTES # BLD AUTO: 0.5 X10(3)/MCL (ref 0.1–1.3)
MONOCYTES NFR BLD AUTO: 7.8 %
NEUTROPHILS # BLD AUTO: 3.4 X10(3)/MCL (ref 2.1–9.2)
NEUTROPHILS NFR BLD AUTO: 52.6 %
PLATELET # BLD AUTO: 211 X10(3)/MCL (ref 130–400)
PMV BLD AUTO: 11.7 FL (ref 9.4–12.4)
POTASSIUM SERPL-SCNC: 3.8 MMOL/L (ref 3.5–5.1)
PROT SERPL-MCNC: 6.4 GM/DL (ref 5.8–7.6)
RBC # BLD AUTO: 4.54 X10(6)/MCL (ref 4.2–5.4)
SODIUM SERPL-SCNC: 141 MMOL/L (ref 136–145)
WBC # SPEC AUTO: 6.4 X10(3)/MCL (ref 4.5–11.5)

## 2022-06-23 PROCEDURE — 36415 COLL VENOUS BLD VENIPUNCTURE: CPT

## 2022-06-23 PROCEDURE — 82378 CARCINOEMBRYONIC ANTIGEN: CPT

## 2022-06-23 PROCEDURE — 85025 COMPLETE CBC W/AUTO DIFF WBC: CPT

## 2022-06-23 PROCEDURE — 80053 COMPREHEN METABOLIC PANEL: CPT

## 2022-06-24 LAB — CANCER AG15-3 SERPL IA-ACNC: 12 U/ML

## 2022-06-27 ENCOUNTER — OFFICE VISIT (OUTPATIENT)
Dept: HEMATOLOGY/ONCOLOGY | Facility: CLINIC | Age: 72
End: 2022-06-27
Payer: MEDICARE

## 2022-06-27 VITALS
DIASTOLIC BLOOD PRESSURE: 84 MMHG | OXYGEN SATURATION: 98 % | BODY MASS INDEX: 35.26 KG/M2 | TEMPERATURE: 98 F | RESPIRATION RATE: 18 BRPM | WEIGHT: 206.56 LBS | HEIGHT: 64 IN | SYSTOLIC BLOOD PRESSURE: 137 MMHG | HEART RATE: 77 BPM

## 2022-06-27 DIAGNOSIS — Z79.811 LONG TERM (CURRENT) USE OF AROMATASE INHIBITORS: ICD-10-CM

## 2022-06-27 DIAGNOSIS — C50.919 MALIGNANT NEOPLASM OF FEMALE BREAST, UNSPECIFIED ESTROGEN RECEPTOR STATUS, UNSPECIFIED LATERALITY, UNSPECIFIED SITE OF BREAST: Primary | ICD-10-CM

## 2022-06-27 PROBLEM — F41.9 ANXIETY: Status: ACTIVE | Noted: 2022-06-27

## 2022-06-27 PROBLEM — K21.9 GERD (GASTROESOPHAGEAL REFLUX DISEASE): Status: ACTIVE | Noted: 2022-06-27

## 2022-06-27 PROBLEM — C50.512 MALIGNANT NEOPLASM OF LOWER-OUTER QUADRANT OF LEFT FEMALE BREAST: Status: ACTIVE | Noted: 2022-03-10

## 2022-06-27 PROBLEM — E03.9 HYPOTHYROID: Status: ACTIVE | Noted: 2022-06-27

## 2022-06-27 PROBLEM — E78.5 DYSLIPIDEMIA: Status: ACTIVE | Noted: 2022-06-27

## 2022-06-27 PROBLEM — I25.10 ARTERIOSCLEROSIS OF CORONARY ARTERY: Status: ACTIVE | Noted: 2022-06-27

## 2022-06-27 PROBLEM — I10 HTN (HYPERTENSION): Status: ACTIVE | Noted: 2022-06-27

## 2022-06-27 PROBLEM — Z86.718 HISTORY OF DEEP VENOUS THROMBOSIS: Status: ACTIVE | Noted: 2022-06-27

## 2022-06-27 PROBLEM — I25.10 ATHEROSCLEROSIS OF CORONARY ARTERY WITHOUT ANGINA PECTORIS: Status: ACTIVE | Noted: 2022-06-27

## 2022-06-27 PROBLEM — Z96.659 HISTORY OF TOTAL KNEE REPLACEMENT: Status: ACTIVE | Noted: 2022-06-27

## 2022-06-27 PROCEDURE — 1101F PT FALLS ASSESS-DOCD LE1/YR: CPT | Mod: CPTII,S$GLB,, | Performed by: NURSE PRACTITIONER

## 2022-06-27 PROCEDURE — 1159F MED LIST DOCD IN RCRD: CPT | Mod: CPTII,S$GLB,, | Performed by: NURSE PRACTITIONER

## 2022-06-27 PROCEDURE — 3008F BODY MASS INDEX DOCD: CPT | Mod: CPTII,S$GLB,, | Performed by: NURSE PRACTITIONER

## 2022-06-27 PROCEDURE — 3288F PR FALLS RISK ASSESSMENT DOCUMENTED: ICD-10-PCS | Mod: CPTII,S$GLB,, | Performed by: NURSE PRACTITIONER

## 2022-06-27 PROCEDURE — 1101F PR PT FALLS ASSESS DOC 0-1 FALLS W/OUT INJ PAST YR: ICD-10-PCS | Mod: CPTII,S$GLB,, | Performed by: NURSE PRACTITIONER

## 2022-06-27 PROCEDURE — 99214 PR OFFICE/OUTPT VISIT, EST, LEVL IV, 30-39 MIN: ICD-10-PCS | Mod: S$GLB,,, | Performed by: NURSE PRACTITIONER

## 2022-06-27 PROCEDURE — 1159F PR MEDICATION LIST DOCUMENTED IN MEDICAL RECORD: ICD-10-PCS | Mod: CPTII,S$GLB,, | Performed by: NURSE PRACTITIONER

## 2022-06-27 PROCEDURE — 99214 OFFICE O/P EST MOD 30 MIN: CPT | Mod: S$GLB,,, | Performed by: NURSE PRACTITIONER

## 2022-06-27 PROCEDURE — 1126F AMNT PAIN NOTED NONE PRSNT: CPT | Mod: CPTII,S$GLB,, | Performed by: NURSE PRACTITIONER

## 2022-06-27 PROCEDURE — 99999 PR PBB SHADOW E&M-EST. PATIENT-LVL IV: CPT | Mod: PBBFAC,,, | Performed by: NURSE PRACTITIONER

## 2022-06-27 PROCEDURE — 1160F PR REVIEW ALL MEDS BY PRESCRIBER/CLIN PHARMACIST DOCUMENTED: ICD-10-PCS | Mod: CPTII,S$GLB,, | Performed by: NURSE PRACTITIONER

## 2022-06-27 PROCEDURE — 3079F PR MOST RECENT DIASTOLIC BLOOD PRESSURE 80-89 MM HG: ICD-10-PCS | Mod: CPTII,S$GLB,, | Performed by: NURSE PRACTITIONER

## 2022-06-27 PROCEDURE — 1160F RVW MEDS BY RX/DR IN RCRD: CPT | Mod: CPTII,S$GLB,, | Performed by: NURSE PRACTITIONER

## 2022-06-27 PROCEDURE — 3075F SYST BP GE 130 - 139MM HG: CPT | Mod: CPTII,S$GLB,, | Performed by: NURSE PRACTITIONER

## 2022-06-27 PROCEDURE — 99999 PR PBB SHADOW E&M-EST. PATIENT-LVL IV: ICD-10-PCS | Mod: PBBFAC,,, | Performed by: NURSE PRACTITIONER

## 2022-06-27 PROCEDURE — 3288F FALL RISK ASSESSMENT DOCD: CPT | Mod: CPTII,S$GLB,, | Performed by: NURSE PRACTITIONER

## 2022-06-27 PROCEDURE — 3008F PR BODY MASS INDEX (BMI) DOCUMENTED: ICD-10-PCS | Mod: CPTII,S$GLB,, | Performed by: NURSE PRACTITIONER

## 2022-06-27 PROCEDURE — 3075F PR MOST RECENT SYSTOLIC BLOOD PRESS GE 130-139MM HG: ICD-10-PCS | Mod: CPTII,S$GLB,, | Performed by: NURSE PRACTITIONER

## 2022-06-27 PROCEDURE — 1126F PR PAIN SEVERITY QUANTIFIED, NO PAIN PRESENT: ICD-10-PCS | Mod: CPTII,S$GLB,, | Performed by: NURSE PRACTITIONER

## 2022-06-27 PROCEDURE — 3079F DIAST BP 80-89 MM HG: CPT | Mod: CPTII,S$GLB,, | Performed by: NURSE PRACTITIONER

## 2022-06-27 RX ORDER — ANASTROZOLE 1 MG/1
1 TABLET ORAL DAILY
COMMUNITY
Start: 2022-04-25 | End: 2022-10-17

## 2022-06-27 NOTE — PROGRESS NOTES
Subjective:       Patient ID: Elina Paris is a 72 y.o. female.    Chief Complaint: Follow-up (6 week follow up )      Diagnosis:  1.  pT1c, N0, M0 stage IA triple positive invasive ductal carcinoma of the right breast.    Current Treatment:    Adjuvant endocrine therapy started October 2021.      Treatment History:  1.  Weekly taxol and herceptin for 12 weeks followed by a year of maintenance herceptin.  Week 1 given on 5/13/2021, week 12 given on 8/19/2021.  Maintenance Herceptin started on 9/9/2021 and completed 5/19/22  2. Adjuvant radiation therapy to start on 9/2/2021, completed 9/30/2021.    Follow-up  Pertinent negatives include no abdominal pain, arthralgias, chest pain, chills, congestion, coughing, diaphoresis, fatigue, fever, headaches, numbness, rash, sore throat or weakness.        Problem List:   Patient who underwent a screening mammogram on 12/9/2020 that showed multiple morphologically oval/circumscribed masses throughout both breasts.  This was read as BI-RADS 0, additional imaging recommended.  Diagnostic mammogram and ultrasound of the left breast on 1/4/2021 was performed and showed a 1.6 cm irregular mass at the 3 o'clock position, a 1.0 cm oval mass with obscured margins at the 3 o'clock position and a 0.6 cm irregular mass at the 2 o'clock position.  On ultrasound, the 3:00 lesion 8 cm from the nipple measured 1.6 x 1.2 x 1.2 cm.  This was irregular with angular/microlobulated margins.  There was also posterior shadowing.  The 3 o'clock position 3 cm from the nipple showed a 1.0 x 1.0 x 0.7 cm oval circumscribed mass with internal septation.  The 2 o'clock position lesion 5 cm from the nipple measured 0.6 x 0.5 x 0.7 cm, was avascular with peripheral hyper echogenicity and central hypoechogenicity and indistinct margins.  BI-RADS 4, biopsy recommended.  Patient underwent biopsy on 1/14/2021, the 3 o'clock position lesion 8 cm from the nipple revealed invasive ductal carcinoma.  The  left breast 3 o'clock position 3 cm from the nipple returned as invasive ductal carcinoma.  The left breast lesion at the 2 o'clock position showed benign breast tissue.  Estrogen receptor was 99.74% positive, progesterone preceptor was 98.57% positive, HER-2 was 2+ by IHC and positive by FISH.  Patient then underwent bilateral breast MRI on 2/25/2021 which revealed biopsy-proven malignancy at the 3 o'clock position in the left breast spanning 5.7 cm without suspicious enhancement in either breast.  Patient then underwent a left sided lumpectomy on 3/8/2021, final pathology revealed a pT1c, N0, M0 stage IA triple positive invasive ductal carcinoma.  The patient subsequently had a bilateral breast reduction.  This was done by Dr. Manuel Mclean.  She was referred to medical oncology.  I saw the patient on 3/31/2021.  Baseline DEXA scan completed in 4/8/2021 showed mild osteopenia in the left and right femoral necks.  Echocardiogram completed on 4/14/2021 showed EF of 55-60%.  Taxol and herceptin started on 5/13/2021, week 12 given on 8/19/2021.  Repeat ECHO on 8/31/2021 showed EF of 55-60%.  Radiation started on 9/2/2021.  Started maintenance Herceptin on 9/9/2021.  Completed radiation on 9/30/2021.  Started adjuvant endocrine therapy in October 2021.  Repeat ECHOs have been done on a regular basis and showed no true decrease in ejection fraction, most recently on 2/25/2022 with ejection fraction of 55%.        Interval History:  Patient presents to clinic for scheduled follow up appointment.  She completed maintenance Herceptin on 05/19/2022.  She is feeling good and denies any new complaints. She does have some pain in the left knee but states that this knee is s/p replacement and she was recently notified that the hardware used has been recalled.       Past Medical History:   Diagnosis Date    Anxiety     Breast cancer     CAD (coronary artery disease)     COPD (chronic obstructive pulmonary disease)      GERD (gastroesophageal reflux disease)     HLD (hyperlipidemia)     Hypertension     Hypothyroidism       Past Surgical History:   Procedure Laterality Date    CHOLECYSTECTOMY      COLONOSCOPY  07/06/2016    HYSTERECTOMY      MASTECTOMY, PARTIAL  03/08/2021    MEDIPORT INSERTION, SINGLE  08/2021    REDUCTION OF BOTH BREASTS Bilateral 03/08/2021    TONSILLECTOMY      TOTAL KNEE ARTHROPLASTY Bilateral      Social History     Socioeconomic History    Marital status:    Tobacco Use    Smoking status: Former Smoker     Types: Cigarettes    Smokeless tobacco: Never Used   Substance and Sexual Activity    Alcohol use: Never    Drug use: Never      Family History   Problem Relation Age of Onset    Cancer Mother 68    Coronary artery disease Mother     Hypertension Mother     Hypertension Father     Coronary artery disease Father     Cancer Brother     Hypertension Brother       Review of patient's allergies indicates:  No Known Allergies   Review of Systems   Constitutional: Negative for appetite change, chills, diaphoresis, fatigue, fever and unexpected weight change.   HENT: Negative for nasal congestion, mouth sores, sinus pressure/congestion and sore throat.    Eyes: Negative for pain and visual disturbance.   Respiratory: Negative for cough, chest tightness and shortness of breath.    Cardiovascular: Negative for chest pain, palpitations and leg swelling.   Gastrointestinal: Negative for abdominal distention, abdominal pain, blood in stool, constipation and diarrhea.   Genitourinary: Negative for dysuria, frequency and hematuria.   Musculoskeletal: Negative for arthralgias and back pain.   Integumentary:  Negative for rash.   Neurological: Negative for dizziness, weakness, numbness and headaches.   Hematological: Negative for adenopathy.   Psychiatric/Behavioral: Negative for confusion. The patient is not nervous/anxious.          Objective:      Physical Exam  Vitals reviewed.    Constitutional:       General: She is not in acute distress.     Appearance: Normal appearance.   HENT:      Head: Normocephalic and atraumatic.      Nose: Nose normal.   Eyes:      Extraocular Movements: Extraocular movements intact.      Conjunctiva/sclera: Conjunctivae normal.   Cardiovascular:      Rate and Rhythm: Normal rate and regular rhythm.      Heart sounds: Normal heart sounds.   Pulmonary:      Effort: Pulmonary effort is normal.      Breath sounds: Normal breath sounds.   Abdominal:      General: Bowel sounds are normal.      Palpations: Abdomen is soft.   Musculoskeletal:         General: No swelling. Normal range of motion.      Cervical back: Normal range of motion and neck supple.      Right lower leg: No edema.      Left lower leg: No edema.   Lymphadenopathy:      Cervical: No cervical adenopathy.   Skin:     General: Skin is warm and dry.   Neurological:      General: No focal deficit present.      Mental Status: She is alert and oriented to person, place, and time. Mental status is at baseline.   Psychiatric:         Mood and Affect: Mood normal.         Behavior: Behavior normal.       LABS AND IMAGING REVIEWED IN EPIC        Assessment:     1.  pT1c, N0, M0 stage IA triple positive invasive ductal carcinoma of the right breast.        Plan:         Treated with Taxol and Herceptin weekly for 12 weeks, completed on 8/19/2021.  Radiation started on 9/2/2021, finished on 9/30/2021.       Continue endocrine therapy as prescribed, started October 2021. Will complete maintenance Herceptin on 5/19/2022.    Echocardiograms have been done every 3 months, most recently on 6/8/2022 with ejection fraction of 58%.    Baseline DEXA scan completed in 4/8/2021 showed mild osteopenia of the left and right femoral neck.    Started maintenance Herceptin on 9/9/2021 and completed 1 year worth in May 2022.      CBC, CMP, CEA, CA 15-3 prior to follow up     Return to clinic in 3 months     EDI Elaine

## 2022-08-05 ENCOUNTER — TELEPHONE (OUTPATIENT)
Dept: HEMATOLOGY/ONCOLOGY | Facility: CLINIC | Age: 72
End: 2022-08-05
Payer: MEDICARE

## 2022-08-05 DIAGNOSIS — N64.4 BREAST PAIN, LEFT: ICD-10-CM

## 2022-08-05 DIAGNOSIS — N63.0 BREAST SWELLING: ICD-10-CM

## 2022-08-05 DIAGNOSIS — C50.512 MALIGNANT NEOPLASM OF LOWER-OUTER QUADRANT OF LEFT FEMALE BREAST, UNSPECIFIED ESTROGEN RECEPTOR STATUS: Primary | ICD-10-CM

## 2022-08-17 ENCOUNTER — HOSPITAL ENCOUNTER (OUTPATIENT)
Dept: RADIOLOGY | Facility: HOSPITAL | Age: 72
Discharge: HOME | End: 2022-08-17
Attending: INTERNAL MEDICINE
Payer: MEDICARE

## 2022-08-17 DIAGNOSIS — N63.0 BREAST SWELLING: ICD-10-CM

## 2022-08-17 DIAGNOSIS — C50.512 MALIGNANT NEOPLASM OF LOWER-OUTER QUADRANT OF LEFT FEMALE BREAST, UNSPECIFIED ESTROGEN RECEPTOR STATUS: ICD-10-CM

## 2022-08-17 DIAGNOSIS — N64.4 BREAST PAIN, LEFT: ICD-10-CM

## 2022-08-17 PROCEDURE — 76642 US BREAST LEFT LIMITED: ICD-10-PCS | Mod: 26,LT,, | Performed by: STUDENT IN AN ORGANIZED HEALTH CARE EDUCATION/TRAINING PROGRAM

## 2022-08-17 PROCEDURE — 77065 DX MAMMO INCL CAD UNI: CPT | Mod: TC,LT

## 2022-08-17 PROCEDURE — 77065 MAMMO DIGITAL DIAGNOSTIC LEFT WITH TOMO: ICD-10-PCS | Mod: 26,LT,, | Performed by: STUDENT IN AN ORGANIZED HEALTH CARE EDUCATION/TRAINING PROGRAM

## 2022-08-17 PROCEDURE — 77065 DX MAMMO INCL CAD UNI: CPT | Mod: 26,LT,, | Performed by: STUDENT IN AN ORGANIZED HEALTH CARE EDUCATION/TRAINING PROGRAM

## 2022-08-17 PROCEDURE — 76642 ULTRASOUND BREAST LIMITED: CPT | Mod: 26,LT,, | Performed by: STUDENT IN AN ORGANIZED HEALTH CARE EDUCATION/TRAINING PROGRAM

## 2022-08-17 PROCEDURE — 77061 MAMMO DIGITAL DIAGNOSTIC LEFT WITH TOMO: ICD-10-PCS | Mod: 26,LT,, | Performed by: STUDENT IN AN ORGANIZED HEALTH CARE EDUCATION/TRAINING PROGRAM

## 2022-08-17 PROCEDURE — 76642 ULTRASOUND BREAST LIMITED: CPT | Mod: TC,LT

## 2022-08-17 PROCEDURE — 77061 BREAST TOMOSYNTHESIS UNI: CPT | Mod: 26,LT,, | Performed by: STUDENT IN AN ORGANIZED HEALTH CARE EDUCATION/TRAINING PROGRAM

## 2022-09-16 ENCOUNTER — HISTORICAL (OUTPATIENT)
Dept: ADMINISTRATIVE | Facility: HOSPITAL | Age: 72
End: 2022-09-16
Payer: MEDICARE

## 2022-09-23 ENCOUNTER — DOCUMENTATION ONLY (OUTPATIENT)
Dept: ADMINISTRATIVE | Facility: HOSPITAL | Age: 72
End: 2022-09-23
Payer: MEDICARE

## 2022-09-23 ENCOUNTER — LAB VISIT (OUTPATIENT)
Dept: LAB | Facility: HOSPITAL | Age: 72
End: 2022-09-23
Attending: INTERNAL MEDICINE
Payer: MEDICARE

## 2022-09-23 DIAGNOSIS — C50.919 MALIGNANT NEOPLASM OF FEMALE BREAST, UNSPECIFIED ESTROGEN RECEPTOR STATUS, UNSPECIFIED LATERALITY, UNSPECIFIED SITE OF BREAST: ICD-10-CM

## 2022-09-23 DIAGNOSIS — Z79.811 LONG TERM (CURRENT) USE OF AROMATASE INHIBITORS: ICD-10-CM

## 2022-09-23 LAB
ALBUMIN SERPL-MCNC: 3.7 GM/DL (ref 3.4–4.8)
ALBUMIN/GLOB SERPL: 1.2 RATIO (ref 1.1–2)
ALP SERPL-CCNC: 108 UNIT/L (ref 40–150)
ALT SERPL-CCNC: 15 UNIT/L (ref 0–55)
AST SERPL-CCNC: 20 UNIT/L (ref 5–34)
BASOPHILS # BLD AUTO: 0.02 X10(3)/MCL (ref 0–0.2)
BASOPHILS NFR BLD AUTO: 0.3 %
BILIRUBIN DIRECT+TOT PNL SERPL-MCNC: 0.8 MG/DL
BUN SERPL-MCNC: 21.1 MG/DL (ref 9.8–20.1)
CALCIUM SERPL-MCNC: 9.7 MG/DL (ref 8.4–10.2)
CEA SERPL-MCNC: <1.73 NG/ML (ref 0–3)
CHLORIDE SERPL-SCNC: 105 MMOL/L (ref 98–107)
CO2 SERPL-SCNC: 29 MMOL/L (ref 23–31)
CREAT SERPL-MCNC: 1.04 MG/DL (ref 0.55–1.02)
EOSINOPHIL # BLD AUTO: 0.14 X10(3)/MCL (ref 0–0.9)
EOSINOPHIL NFR BLD AUTO: 2.1 %
ERYTHROCYTE [DISTWIDTH] IN BLOOD BY AUTOMATED COUNT: 14.7 % (ref 11.5–17)
GFR SERPLBLD CREATININE-BSD FMLA CKD-EPI: 57 MLS/MIN/1.73/M2
GLOBULIN SER-MCNC: 3 GM/DL (ref 2.4–3.5)
GLUCOSE SERPL-MCNC: 139 MG/DL (ref 82–115)
HCT VFR BLD AUTO: 38.9 % (ref 37–47)
HGB BLD-MCNC: 12.2 GM/DL (ref 12–16)
IMM GRANULOCYTES # BLD AUTO: 0.01 X10(3)/MCL (ref 0–0.04)
IMM GRANULOCYTES NFR BLD AUTO: 0.1 %
LYMPHOCYTES # BLD AUTO: 2.03 X10(3)/MCL (ref 0.6–4.6)
LYMPHOCYTES NFR BLD AUTO: 29.9 %
MCH RBC QN AUTO: 27.6 PG (ref 27–31)
MCHC RBC AUTO-ENTMCNC: 31.4 MG/DL (ref 33–36)
MCV RBC AUTO: 88 FL (ref 80–94)
MONOCYTES # BLD AUTO: 0.66 X10(3)/MCL (ref 0.1–1.3)
MONOCYTES NFR BLD AUTO: 9.7 %
NEUTROPHILS # BLD AUTO: 3.9 X10(3)/MCL (ref 2.1–9.2)
NEUTROPHILS NFR BLD AUTO: 57.9 %
PLATELET # BLD AUTO: 222 X10(3)/MCL (ref 130–400)
PMV BLD AUTO: 10.8 FL (ref 7.4–10.4)
POTASSIUM SERPL-SCNC: 4.5 MMOL/L (ref 3.5–5.1)
PROT SERPL-MCNC: 6.7 GM/DL (ref 5.8–7.6)
RBC # BLD AUTO: 4.42 X10(6)/MCL (ref 4.2–5.4)
SODIUM SERPL-SCNC: 139 MMOL/L (ref 136–145)
WBC # SPEC AUTO: 6.8 X10(3)/MCL (ref 4.5–11.5)

## 2022-09-23 PROCEDURE — 82378 CARCINOEMBRYONIC ANTIGEN: CPT

## 2022-09-23 PROCEDURE — 80053 COMPREHEN METABOLIC PANEL: CPT

## 2022-09-23 PROCEDURE — 36415 COLL VENOUS BLD VENIPUNCTURE: CPT

## 2022-09-23 PROCEDURE — 86300 IMMUNOASSAY TUMOR CA 15-3: CPT

## 2022-09-23 PROCEDURE — 85025 COMPLETE CBC W/AUTO DIFF WBC: CPT

## 2022-09-24 LAB — CANCER AG15-3 SERPL IA-ACNC: 12 U/ML

## 2022-10-05 ENCOUNTER — OFFICE VISIT (OUTPATIENT)
Dept: HEMATOLOGY/ONCOLOGY | Facility: CLINIC | Age: 72
End: 2022-10-05
Payer: MEDICARE

## 2022-10-05 ENCOUNTER — OFFICE VISIT (OUTPATIENT)
Dept: SURGERY | Facility: CLINIC | Age: 72
End: 2022-10-05
Payer: MEDICARE

## 2022-10-05 VITALS
HEART RATE: 87 BPM | WEIGHT: 208.56 LBS | TEMPERATURE: 99 F | RESPIRATION RATE: 18 BRPM | HEIGHT: 64 IN | OXYGEN SATURATION: 99 % | DIASTOLIC BLOOD PRESSURE: 84 MMHG | SYSTOLIC BLOOD PRESSURE: 145 MMHG | BODY MASS INDEX: 35.61 KG/M2

## 2022-10-05 VITALS
WEIGHT: 208 LBS | SYSTOLIC BLOOD PRESSURE: 132 MMHG | HEART RATE: 80 BPM | DIASTOLIC BLOOD PRESSURE: 81 MMHG | RESPIRATION RATE: 18 BRPM | BODY MASS INDEX: 35.51 KG/M2 | TEMPERATURE: 99 F | HEIGHT: 64 IN | OXYGEN SATURATION: 97 %

## 2022-10-05 DIAGNOSIS — Z79.811 LONG TERM (CURRENT) USE OF AROMATASE INHIBITORS: ICD-10-CM

## 2022-10-05 DIAGNOSIS — Z90.12 HISTORY OF PARTIAL MASTECTOMY OF LEFT BREAST: ICD-10-CM

## 2022-10-05 DIAGNOSIS — C50.912 MALIGNANT NEOPLASM OF LEFT BREAST IN FEMALE, ESTROGEN RECEPTOR POSITIVE, UNSPECIFIED SITE OF BREAST: Primary | ICD-10-CM

## 2022-10-05 DIAGNOSIS — Z17.0 MALIGNANT NEOPLASM OF LEFT BREAST IN FEMALE, ESTROGEN RECEPTOR POSITIVE, UNSPECIFIED SITE OF BREAST: Primary | ICD-10-CM

## 2022-10-05 DIAGNOSIS — I89.0 LYMPHEDEMA OF BREAST: Primary | ICD-10-CM

## 2022-10-05 DIAGNOSIS — Z98.890 HISTORY OF BILATERAL BREAST REDUCTION SURGERY: ICD-10-CM

## 2022-10-05 DIAGNOSIS — Z85.3 PERSONAL HISTORY OF BREAST CANCER: ICD-10-CM

## 2022-10-05 PROCEDURE — 1159F MED LIST DOCD IN RCRD: CPT | Mod: CPTII,S$GLB,, | Performed by: PHYSICIAN ASSISTANT

## 2022-10-05 PROCEDURE — 99214 OFFICE O/P EST MOD 30 MIN: CPT | Mod: S$GLB,,, | Performed by: NURSE PRACTITIONER

## 2022-10-05 PROCEDURE — 3077F PR MOST RECENT SYSTOLIC BLOOD PRESSURE >= 140 MM HG: ICD-10-PCS | Mod: CPTII,S$GLB,, | Performed by: NURSE PRACTITIONER

## 2022-10-05 PROCEDURE — 1101F PR PT FALLS ASSESS DOC 0-1 FALLS W/OUT INJ PAST YR: ICD-10-PCS | Mod: CPTII,S$GLB,, | Performed by: NURSE PRACTITIONER

## 2022-10-05 PROCEDURE — 1126F PR PAIN SEVERITY QUANTIFIED, NO PAIN PRESENT: ICD-10-PCS | Mod: CPTII,S$GLB,, | Performed by: PHYSICIAN ASSISTANT

## 2022-10-05 PROCEDURE — 3079F PR MOST RECENT DIASTOLIC BLOOD PRESSURE 80-89 MM HG: ICD-10-PCS | Mod: CPTII,S$GLB,, | Performed by: PHYSICIAN ASSISTANT

## 2022-10-05 PROCEDURE — 1159F PR MEDICATION LIST DOCUMENTED IN MEDICAL RECORD: ICD-10-PCS | Mod: CPTII,S$GLB,, | Performed by: NURSE PRACTITIONER

## 2022-10-05 PROCEDURE — 3077F SYST BP >= 140 MM HG: CPT | Mod: CPTII,S$GLB,, | Performed by: NURSE PRACTITIONER

## 2022-10-05 PROCEDURE — 1126F AMNT PAIN NOTED NONE PRSNT: CPT | Mod: CPTII,S$GLB,, | Performed by: PHYSICIAN ASSISTANT

## 2022-10-05 PROCEDURE — 3288F PR FALLS RISK ASSESSMENT DOCUMENTED: ICD-10-PCS | Mod: CPTII,S$GLB,, | Performed by: NURSE PRACTITIONER

## 2022-10-05 PROCEDURE — 3008F BODY MASS INDEX DOCD: CPT | Mod: CPTII,S$GLB,, | Performed by: NURSE PRACTITIONER

## 2022-10-05 PROCEDURE — 1160F RVW MEDS BY RX/DR IN RCRD: CPT | Mod: CPTII,S$GLB,, | Performed by: NURSE PRACTITIONER

## 2022-10-05 PROCEDURE — 1126F AMNT PAIN NOTED NONE PRSNT: CPT | Mod: CPTII,S$GLB,, | Performed by: NURSE PRACTITIONER

## 2022-10-05 PROCEDURE — 99999 PR PBB SHADOW E&M-EST. PATIENT-LVL V: CPT | Mod: PBBFAC,,, | Performed by: NURSE PRACTITIONER

## 2022-10-05 PROCEDURE — 99214 PR OFFICE/OUTPT VISIT, EST, LEVL IV, 30-39 MIN: ICD-10-PCS | Mod: S$GLB,,, | Performed by: PHYSICIAN ASSISTANT

## 2022-10-05 PROCEDURE — 1126F PR PAIN SEVERITY QUANTIFIED, NO PAIN PRESENT: ICD-10-PCS | Mod: CPTII,S$GLB,, | Performed by: NURSE PRACTITIONER

## 2022-10-05 PROCEDURE — 3288F FALL RISK ASSESSMENT DOCD: CPT | Mod: CPTII,S$GLB,, | Performed by: NURSE PRACTITIONER

## 2022-10-05 PROCEDURE — 3075F PR MOST RECENT SYSTOLIC BLOOD PRESS GE 130-139MM HG: ICD-10-PCS | Mod: CPTII,S$GLB,, | Performed by: PHYSICIAN ASSISTANT

## 2022-10-05 PROCEDURE — 3079F DIAST BP 80-89 MM HG: CPT | Mod: CPTII,S$GLB,, | Performed by: PHYSICIAN ASSISTANT

## 2022-10-05 PROCEDURE — 3008F PR BODY MASS INDEX (BMI) DOCUMENTED: ICD-10-PCS | Mod: CPTII,S$GLB,, | Performed by: NURSE PRACTITIONER

## 2022-10-05 PROCEDURE — 3008F PR BODY MASS INDEX (BMI) DOCUMENTED: ICD-10-PCS | Mod: CPTII,S$GLB,, | Performed by: PHYSICIAN ASSISTANT

## 2022-10-05 PROCEDURE — 1159F MED LIST DOCD IN RCRD: CPT | Mod: CPTII,S$GLB,, | Performed by: NURSE PRACTITIONER

## 2022-10-05 PROCEDURE — 99999 PR PBB SHADOW E&M-EST. PATIENT-LVL IV: ICD-10-PCS | Mod: PBBFAC,,, | Performed by: PHYSICIAN ASSISTANT

## 2022-10-05 PROCEDURE — 3075F SYST BP GE 130 - 139MM HG: CPT | Mod: CPTII,S$GLB,, | Performed by: PHYSICIAN ASSISTANT

## 2022-10-05 PROCEDURE — 99214 OFFICE O/P EST MOD 30 MIN: CPT | Mod: S$GLB,,, | Performed by: PHYSICIAN ASSISTANT

## 2022-10-05 PROCEDURE — 1101F PT FALLS ASSESS-DOCD LE1/YR: CPT | Mod: CPTII,S$GLB,, | Performed by: NURSE PRACTITIONER

## 2022-10-05 PROCEDURE — 99999 PR PBB SHADOW E&M-EST. PATIENT-LVL V: ICD-10-PCS | Mod: PBBFAC,,, | Performed by: NURSE PRACTITIONER

## 2022-10-05 PROCEDURE — 1159F PR MEDICATION LIST DOCUMENTED IN MEDICAL RECORD: ICD-10-PCS | Mod: CPTII,S$GLB,, | Performed by: PHYSICIAN ASSISTANT

## 2022-10-05 PROCEDURE — 99999 PR PBB SHADOW E&M-EST. PATIENT-LVL IV: CPT | Mod: PBBFAC,,, | Performed by: PHYSICIAN ASSISTANT

## 2022-10-05 PROCEDURE — 99214 PR OFFICE/OUTPT VISIT, EST, LEVL IV, 30-39 MIN: ICD-10-PCS | Mod: S$GLB,,, | Performed by: NURSE PRACTITIONER

## 2022-10-05 PROCEDURE — 3079F PR MOST RECENT DIASTOLIC BLOOD PRESSURE 80-89 MM HG: ICD-10-PCS | Mod: CPTII,S$GLB,, | Performed by: NURSE PRACTITIONER

## 2022-10-05 PROCEDURE — 3008F BODY MASS INDEX DOCD: CPT | Mod: CPTII,S$GLB,, | Performed by: PHYSICIAN ASSISTANT

## 2022-10-05 PROCEDURE — 3079F DIAST BP 80-89 MM HG: CPT | Mod: CPTII,S$GLB,, | Performed by: NURSE PRACTITIONER

## 2022-10-05 PROCEDURE — 1160F PR REVIEW ALL MEDS BY PRESCRIBER/CLIN PHARMACIST DOCUMENTED: ICD-10-PCS | Mod: CPTII,S$GLB,, | Performed by: NURSE PRACTITIONER

## 2022-10-05 RX ORDER — AMOXICILLIN 500 MG/1
1000 CAPSULE ORAL 2 TIMES DAILY
COMMUNITY
Start: 2022-10-03 | End: 2023-07-17 | Stop reason: DRUGHIGH

## 2022-10-05 NOTE — PROGRESS NOTES
Subjective:       Patient ID: Elina Paris is a 72 y.o. female.    Chief Complaint: Follow-up (3 month. Patient stated no new problems )      Diagnosis:  1.  pT1c, N0, M0 stage IA triple positive invasive ductal carcinoma of the left breast.    Current Treatment:    Adjuvant endocrine therapy started October 2021- anastrozole      Treatment History:  1.  Weekly taxol and herceptin for 12 weeks followed by a year of maintenance herceptin.  Week 1 given on 5/13/2021, week 12 given on 8/19/2021.  Maintenance Herceptin started on 9/9/2021 and completed 5/19/22  2. Adjuvant radiation therapy to start on 9/2/2021, completed 9/30/2021.    Follow-up  Pertinent negatives include no abdominal pain, arthralgias, chest pain, chills, congestion, coughing, diaphoresis, fatigue, fever, headaches, numbness, rash, sore throat or weakness.      Problem List:   Patient who underwent a screening mammogram on 12/9/2020 that showed multiple morphologically oval/circumscribed masses throughout both breasts.  This was read as BI-RADS 0, additional imaging recommended.  Diagnostic mammogram and ultrasound of the left breast on 1/4/2021 was performed and showed a 1.6 cm irregular mass at the 3 o'clock position, a 1.0 cm oval mass with obscured margins at the 3 o'clock position and a 0.6 cm irregular mass at the 2 o'clock position.  On ultrasound, the 3:00 lesion 8 cm from the nipple measured 1.6 x 1.2 x 1.2 cm.  This was irregular with angular/microlobulated margins.  There was also posterior shadowing.  The 3 o'clock position 3 cm from the nipple showed a 1.0 x 1.0 x 0.7 cm oval circumscribed mass with internal septation.  The 2 o'clock position lesion 5 cm from the nipple measured 0.6 x 0.5 x 0.7 cm, was avascular with peripheral hyper echogenicity and central hypoechogenicity and indistinct margins.  BI-RADS 4, biopsy recommended.  Patient underwent biopsy on 1/14/2021, the 3 o'clock position lesion 8 cm from the nipple revealed  invasive ductal carcinoma.  The left breast 3 o'clock position 3 cm from the nipple returned as invasive ductal carcinoma.  The left breast lesion at the 2 o'clock position showed benign breast tissue.  Estrogen receptor was 99.74% positive, progesterone preceptor was 98.57% positive, HER-2 was 2+ by IHC and positive by FISH.  Patient then underwent bilateral breast MRI on 2/25/2021 which revealed biopsy-proven malignancy at the 3 o'clock position in the left breast spanning 5.7 cm without suspicious enhancement in either breast.  Patient then underwent a left sided lumpectomy on 3/8/2021, final pathology revealed a pT1c, N0, M0 stage IA triple positive invasive ductal carcinoma.  The patient subsequently had a bilateral breast reduction.  This was done by Dr. Manuel Mclean.  She was referred to medical oncology.  I saw the patient on 3/31/2021.  Baseline DEXA scan completed in 4/8/2021 showed mild osteopenia in the left and right femoral necks.  Echocardiogram completed on 4/14/2021 showed EF of 55-60%.  Taxol and herceptin started on 5/13/2021, week 12 given on 8/19/2021.  Repeat ECHO on 8/31/2021 showed EF of 55-60%.  Radiation started on 9/2/2021.  Started maintenance Herceptin on 9/9/2021.  Completed radiation on 9/30/2021.  Started adjuvant endocrine therapy in October 2021.  Repeat ECHOs have been done on a regular basis and showed no true decrease in ejection fraction, most recently on 2/25/2022 with ejection fraction of 55%.        Interval History:  Patient presents to clinic for scheduled follow up appointment. She completed maintenance Herceptin on 05/19/2022.  She is feeling good and denies any new complaints. She does have some pain in the left knee but states that this knee is s/p replacement and she was recently notified that the hardware used has been recalled.  She continues to have some redness and tenderness to her left breast.  She has seen Dr. Renee for this as well as ANAIS Carcamo  with breast surgery. She is being referred for lymphedema treatment to the right breast.      Past Medical History:   Diagnosis Date    Anxiety     Breast cancer     CAD (coronary artery disease)     COPD (chronic obstructive pulmonary disease)     GERD (gastroesophageal reflux disease)     HLD (hyperlipidemia)     Hypertension     Hypothyroidism       Past Surgical History:   Procedure Laterality Date    CHOLECYSTECTOMY      COLONOSCOPY  07/06/2016    HYSTERECTOMY      MASTECTOMY, PARTIAL  03/08/2021    MEDIPORT INSERTION, SINGLE  08/2021    REDUCTION OF BOTH BREASTS Bilateral 03/08/2021    TONSILLECTOMY      TOTAL KNEE ARTHROPLASTY Bilateral      Social History     Socioeconomic History    Marital status:    Tobacco Use    Smoking status: Former     Types: Cigarettes    Smokeless tobacco: Never   Substance and Sexual Activity    Alcohol use: Never    Drug use: Never      Family History   Problem Relation Age of Onset    Cancer Mother 68    Coronary artery disease Mother     Hypertension Mother     Hypertension Father     Coronary artery disease Father     Cancer Brother     Hypertension Brother       Review of patient's allergies indicates:  No Known Allergies   Review of Systems   Constitutional:  Negative for appetite change, chills, diaphoresis, fatigue, fever and unexpected weight change.   HENT:  Negative for nasal congestion, mouth sores, sinus pressure/congestion and sore throat.    Eyes:  Negative for pain and visual disturbance.   Respiratory:  Negative for cough, chest tightness and shortness of breath.    Cardiovascular:  Negative for chest pain, palpitations and leg swelling.   Gastrointestinal:  Negative for abdominal distention, abdominal pain, blood in stool, constipation and diarrhea.   Genitourinary:  Negative for dysuria, frequency and hematuria.   Musculoskeletal:  Negative for arthralgias and back pain.   Integumentary:  Negative for rash.   Neurological:   Negative for dizziness, weakness, numbness and headaches.   Hematological:  Negative for adenopathy.   Psychiatric/Behavioral:  Negative for confusion. The patient is not nervous/anxious.        Objective:      Physical Exam  Vitals reviewed.   Constitutional:       General: She is not in acute distress.     Appearance: Normal appearance.   HENT:      Head: Normocephalic and atraumatic.      Nose: Nose normal.   Eyes:      Extraocular Movements: Extraocular movements intact.      Conjunctiva/sclera: Conjunctivae normal.   Cardiovascular:      Rate and Rhythm: Normal rate and regular rhythm.      Heart sounds: Normal heart sounds.   Pulmonary:      Effort: Pulmonary effort is normal.      Breath sounds: Normal breath sounds.   Chest:      Comments: Breast exam deferred as she was recently seen by surgery.   Abdominal:      General: Bowel sounds are normal.      Palpations: Abdomen is soft.   Musculoskeletal:         General: No swelling. Normal range of motion.      Cervical back: Normal range of motion and neck supple.      Right lower leg: No edema.      Left lower leg: No edema.   Lymphadenopathy:      Cervical: No cervical adenopathy.   Skin:     General: Skin is warm and dry.   Neurological:      General: No focal deficit present.      Mental Status: She is alert and oriented to person, place, and time. Mental status is at baseline.   Psychiatric:         Mood and Affect: Mood normal.         Behavior: Behavior normal.     LABS AND IMAGING REVIEWED IN EPIC        Assessment:     1.  pT1c, N0, M0 stage IA triple positive invasive ductal carcinoma of the left breast.        Plan:         Treated with Taxol and Herceptin weekly for 12 weeks, completed on 8/19/2021.  Radiation started on 9/2/2021, finished on 9/30/2021.       Continue endocrine therapy as prescribed, started October 2021. Will complete maintenance Herceptin on 5/19/2022.    Echocardiograms done every 3 months during treatment with Herceptin, most  recently on 6/8/2022 with ejection fraction of 58%.    Baseline DEXA scan completed in 4/8/2021 showed mild osteopenia of the left and right femoral neck.    Started maintenance Herceptin on 9/9/2021 and completed 1 year worth in May 2022.      CBC, CMP, CEA, CA 15-3 prior to follow up     Return to clinic in 3 months     EDI Elaine

## 2022-10-05 NOTE — PROGRESS NOTES
[facility] Breast Surg  Breast Surgical Oncology  Follow-Up Patient Office Visit       Referring Provider: No ref. provider found   PCP: Darron Hewitt Jr   Care Team:   Medical Oncologist: Dr. Scout Prasad  Radiation Oncologist: Dr. Jessica Renee      Chief Complaint:   Chief Complaint   Patient presents with    Breast Mass     Follow up visit for breast tenderness, swelling, and redness. Patient just came from Dr. Renee's office and need to be seen.        Subjective:   Treatment History:  1. Left lumpectomy with left SLNB with bilateral breast reduction on 3/8/2021.  2. Taxol and Herceptin x's 12 weeks, completed 8/19/2021.  3. Maintenance Herceptin x's 1 year started 9/9/2021 - 5/19/2022  4. Adjuvant Radiation 9/2/2021 - 9/30/2021    Interval History:  10/5/2022 - Elina Paris returns today after being seen by radiation oncology with concerns regarding redness, pain, and intermittent swelling to the left breast, which she states has been present since the time of her surgery and when she finished radiation. The swelling and pain comes and goes. She denies swelling in the left arm. She had a L DG MG to evaluate breast pain concerns in about 1 month ago which were benign. There was no detrimental interval change in the mammographic appearance of the left breast and no sonographic evidence of a mass, fluid collection, or hyperemia to suggest inflammation.    HPI:  Elina Paris is a pleasant 70 Years old Female who presents with AJCC 8th ed clinical anatomic stage IA / prognostic stage IA (cT1c cN0 M0) multifocal Left invasive ductal carcinoma grade 1 at 3 o'clock 8 cm FN ER 99.74%, AK 98.57%, HER2 2 - equivocal on IHC/FISH positive and left invasive ductal carcinoma, grade 2 at 3 o'clock 3 cm FN ER 98.93%, AK 0.0%, HER2 2- equivocal on IHC/FISH positive. She is SP Left Lumpectomy with SLNB on 3/8/2021. Final pathology revealed left breast invasive ductal  carcinoma , grade 1 with one foci measuring 1.8 cm and another foci measuring 2.0 cm with associated foci of DCIS. All margins were clear. Two sentinel lymph nodes negative for metastatic carcinoma. The patient subsequently had a bilateral breast reduction. Due to HER-2 being positive, we treated with Taxol and Herceptin weekly for 12 weeks, completed on 2021. She will then complete a years worth of Herceptin, started on 2021. Radiation started on 2021, finished on 2021.      Imagin. 2020 BL SC MG at [facility] - which revealed multiple morphologically oval/circumscribed masses throughout both breasts which have changed in size or resolved after review of prior images, in the left breast UOQ mid depth and UOQ anterior depth two focal asymmetries are seen, and a left breast asymmetry anterior and close to the skin. No other significant masses, calcifications, or other findings in either breast. BI-RADS 0: additional imaging is recommended.  2. 21 LEFT DG MG / US LEFT BREAST LIMITED at [facility] - which revealed on L MG at 3 o'clock mid depth a 1.6 cm irregular mass, at 3 o'clock anterior depth 1.0 cm oval mass with obscured margins, and at 2 o'clock anterior depth a 0.6 cm irregular mass. On L US at 3 o'clock 8 cm FN a 1.6 cm x 1.2 cm x 1.2 cm irregular mass with angular.microlobulated margins/posterior shadowing, 3 o'clock 3 cm FN a 1.0 cm x 1.0 cm x 0.7 cm oval circumscribed mass with internal septation (consisted with complicated cyst), and 2 o'clock 5 cm FN a 0.6 cm x 0.5 cm x 0.7 cm avascular with peripheral hyperechogenicity and central hypoechogenicity and indistinct margins.  BI-RADS 4: suspicious and biopsy was recommended for 3 o'clock 8 cm FN mass and 2 o'clock 5 cm FN. US-guided aspiration of 3 o'clock 3 cm FN cyst  3. 2021 BL Breast MRI at [facility] - which revealed biopsy proven malignancy at 3 o'clock in the left breast spanning 5.7 cm without suspicious enhancement in either  breast. BI-RADS 6: biopsy proven malignancy.  3. 11/8/2021 BL DG MG - BENIGN: no mammographic evidence of malignancy. Benign post surgical changes are present bilaterally.       Pathology:  1. Ultrasound-guided Core Biopsy 3 o'clock 8 cm FN - Invasive ductal carcinoma, grade 1  ER 99.74%  SC 98.57%  HER2 SOFIA 2 + equivocal on IHC/FISH positive  2. Ultrasound-guided Core Biopsy 3 o'clock 3 cm FN - Invasive ductal carcinoma, grade 2  ER 98.93%  SC 0.0%  HER2 SOFIA 2 + equivocal on IHC/FISH positive  3. Ultrasound-guided Core Biopsy 2 o'clock 5 cm FN - Fragments of benign breast tissue and adipose tissue  4. 3/8/2021 Left Lumpectomy with Left [place] Lymph Node Biopsy revealed left breast invasive ductal carcinoma , grade 1 with one foci measuring 1.8 cm and another foci measuring 2.0 cm with associated foci of DCIS. All margins were clear. Two sentinel lymph nodes negative for metastatic carcinoma.       OB/GYN History:  Menarche Onset: 11  Menopause: Post  Hormonal birth control (duration): no  Pregnancies: none  Age at first pregnancy: n/a  Child births: 0  Breastfeeding duration: no   Hysterectomy: yes  Oophorectomy: ?  HRT: no    Other:  MG breast density: Category B (Scattered fibroglandular)  Prior thoracic RT: none prior to breast cancer dx  Genetic testing: none  Ashkenazi Moravian descent: No      Family History:  Family History   Problem Relation Age of Onset    Cancer Mother 68    Coronary artery disease Mother     Hypertension Mother     Hypertension Father     Coronary artery disease Father     Cancer Brother     Hypertension Brother         Patient History:  Past Medical History:   Diagnosis Date    Anxiety     Breast cancer     CAD (coronary artery disease)     COPD (chronic obstructive pulmonary disease)     GERD (gastroesophageal reflux disease)     HLD (hyperlipidemia)     Hypertension     Hypothyroidism        Past Surgical History:   Procedure Laterality Date    CHOLECYSTECTOMY      COLONOSCOPY   07/06/2016    HYSTERECTOMY      MASTECTOMY, PARTIAL  03/08/2021    MEDIPORT INSERTION, SINGLE  08/2021    REDUCTION OF BOTH BREASTS Bilateral 03/08/2021    TONSILLECTOMY      TOTAL KNEE ARTHROPLASTY Bilateral        Social History     Socioeconomic History    Marital status:    Tobacco Use    Smoking status: Former     Types: Cigarettes    Smokeless tobacco: Never   Substance and Sexual Activity    Alcohol use: Never    Drug use: Never       Immunization History   Administered Date(s) Administered    COVID-19, MRNA, LN-S, PF (Pfizer) (Purple Cap) 01/13/2021, 02/03/2021       Medications/Allergies:  Current Outpatient Medications on File Prior to Visit   Medication Sig Dispense Refill    acetaminophen (TYLENOL) 500 MG tablet Take 500 mg by mouth every 6 (six) hours as needed.      albuterol (PROVENTIL/VENTOLIN HFA) 90 mcg/actuation inhaler INHALE 2 PUFFS INTO THE LUNGS EVERY 6 HOURS AS NEEDED FOR WHEEZE      anastrozole (ARIMIDEX) 1 mg Tab Take 1 mg by mouth once daily.      aspirin (ECOTRIN) 81 MG EC tablet Take 81 mg by mouth.      atorvastatin (LIPITOR) 40 MG tablet Take 40 mg by mouth once daily.      diltiaZEM HCl (TIAZAC) 300 mg 24 hr capsule Take 1 capsule by mouth once daily.      esomeprazole (NEXIUM) 40 MG capsule Take 40 mg by mouth as needed.      levothyroxine (SYNTHROID) 50 MCG tablet Take 1 tablet by mouth once daily.      nitroGLYCERIN (NITROSTAT) 0.4 MG SL tablet Place 0.4 mg under the tongue.      traMADoL (ULTRAM) 50 mg tablet TAKE 1 TABLET BY MOUTH EVERY 12 HOURS AS NEEDED FOR PAIN      TRELEGY ELLIPTA 100-62.5-25 mcg DsDv TAKE 1 PUFF BY MOUTH EVERY DAY      triamterene-hydrochlorothiazide 37.5-25 mg (MAXZIDE-25) 37.5-25 mg per tablet Take 1 tablet by mouth once daily.      amoxicillin (AMOXIL) 500 MG capsule Take 1,000 mg by mouth 2 (two) times daily.       No current facility-administered medications on file prior to visit.       Review of patient's allergies indicates:  No Known  Allergies    Review of Systems:  Pertinent items are noted in HPI.     Objective:     Vitals:  Vitals:    10/05/22 1057   BP: 132/81   Pulse: 80   Resp: 18   Temp: 98.6 °F (37 °C)       Body mass index is 35.7 kg/m².     Physical Exam:  General: The patient is awake, alert and oriented times three. The patient is well nourished and in no acute distress.  Neck: There is no evidence of palpable cervical, supraclavicular or axillary adenopathy. The neck is supple. The thyroid is not enlarged.  Musculoskeletal: The patient has a normal range of motion of her bilateral upper extremities.  Chest: Examination of the chest wall fails to reveal any obvious abnormalities. Nonlabored breathing, symmetric expansion.  Breast:  Right: There are well healed incisions from her breast reduction. There is mild diffuse tenderness to the breast. Examination of right breast fails to reveal any dominant masses or areas of significant focal nodularity. The nipple is everted without evidence of discharge. There is no skin dimpling with movement of the pectoralis. There are no significant skin changes overlying the breast.   Left: There are well healed incisions from her breast reduction. There is diffuse hyperpigmentation as well as mild residual redness to the areola and surrounding skin (see image). This was previously reported on last exam 5/2022. Otherwise, examination of the left breast fails to reveal any dominant masses or areas of significant focal nodularity. The nipple is everted without evidence of discharge. There is no skin dimpling with movement of the pectoralis. There are no significant skin changes overlying the breast.  Abdomen: The abdomen is soft, flat, nontender and nondistended.  Integumentary: no rashes or skin lesions present  Neurologic: cranial nerves intact, no signs of peripheral neurological deficit, motor/sensory function intact          Assessment and Plan:       Elina was seen today for breast  mass.    Diagnoses and all orders for this visit:    History of partial mastectomy of left breast    History of bilateral breast reduction surgery    Personal history of breast cancer    Lymphedema of breast          Plan:     1. Recommend referral to [facility] to evaluate for lymphedema given symptoms of intermittent swelling, pain, and redness. No seroma or masses seen on recent diagnostic breast imaging.    2. RTC in 5-6 weeks to re-evaluate symptoms. If no improvement, can consider punch biopsy.    3. BL DG MG is already scheduled for November.     4. Healthy lifestyle guidelines were reviewed. She was encouraged to engage in regular exercise, maintain a healthy body weight, and avoid excessive alcohol consumption. Healthy nutritional guidelines were also discussed. Self-breast examination was reviewed with the patient in detail and she was encouraged to perform this on a monthly basis.          All of her questions were answered.     Nadeen Philip PA-C            Total time on the date of the visit ranged from 30-39 mins (99240). Total time includes both face-to-face and non-face-to-face time personally spent by myself on the day of the visit.    Non-face-to-face time included:  _X_ preparing to see the patient such as reviewing the patient record  __ obtaining and reviewing separately obtained history  _X_ independently interpreting results  _X_ documenting clinical information in electronic health record.    Face-to-face time included:  _X_ performing an appropriate history and examination  _X_ communicating results to the patient  _X_ counseling and educating the patient  __ ordering appropriate medications  __ ordering appropriate tests  _X_ ordering appropriate procedures (including follow-up)  _X_ answering any questions the patient had    Total Time spent on date of visit: 35 minutes

## 2022-11-09 ENCOUNTER — HOSPITAL ENCOUNTER (OUTPATIENT)
Dept: RADIOLOGY | Facility: HOSPITAL | Age: 72
Discharge: HOME | End: 2022-11-09
Attending: PHYSICIAN ASSISTANT
Payer: MEDICARE

## 2022-11-09 DIAGNOSIS — N63.0 LUMP OR MASS IN BREAST: ICD-10-CM

## 2022-11-09 DIAGNOSIS — Z85.3 PERSONAL HISTORY OF BREAST CANCER: ICD-10-CM

## 2022-11-09 DIAGNOSIS — Z90.12 STATUS POST PARTIAL MASTECTOMY, LEFT: ICD-10-CM

## 2022-11-09 PROCEDURE — 77062 BREAST TOMOSYNTHESIS BI: CPT | Mod: TC

## 2022-11-09 PROCEDURE — 77066 MAMMO DIGITAL DIAGNOSTIC BILAT WITH TOMO: ICD-10-PCS | Mod: 26,,, | Performed by: RADIOLOGY

## 2022-11-09 PROCEDURE — 77062 BREAST TOMOSYNTHESIS BI: CPT | Mod: 26,,, | Performed by: RADIOLOGY

## 2022-11-09 PROCEDURE — 77062 MAMMO DIGITAL DIAGNOSTIC BILAT WITH TOMO: ICD-10-PCS | Mod: 26,,, | Performed by: RADIOLOGY

## 2022-11-09 PROCEDURE — 77066 DX MAMMO INCL CAD BI: CPT | Mod: 26,,, | Performed by: RADIOLOGY

## 2022-11-17 ENCOUNTER — OFFICE VISIT (OUTPATIENT)
Dept: SURGERY | Facility: CLINIC | Age: 72
End: 2022-11-17
Payer: MEDICARE

## 2022-11-17 VITALS
BODY MASS INDEX: 37.28 KG/M2 | WEIGHT: 210.38 LBS | OXYGEN SATURATION: 98 % | RESPIRATION RATE: 20 BRPM | HEART RATE: 85 BPM | DIASTOLIC BLOOD PRESSURE: 83 MMHG | TEMPERATURE: 98 F | HEIGHT: 63 IN | SYSTOLIC BLOOD PRESSURE: 146 MMHG

## 2022-11-17 DIAGNOSIS — I89.0 LYMPHEDEMA OF BREAST: ICD-10-CM

## 2022-11-17 DIAGNOSIS — C50.812 MALIGNANT NEOPLASM OF OVERLAPPING SITES OF LEFT BREAST IN FEMALE, ESTROGEN RECEPTOR POSITIVE: Primary | ICD-10-CM

## 2022-11-17 DIAGNOSIS — Z98.890 HISTORY OF BILATERAL BREAST REDUCTION SURGERY: ICD-10-CM

## 2022-11-17 DIAGNOSIS — Z90.12 HISTORY OF PARTIAL MASTECTOMY OF LEFT BREAST: ICD-10-CM

## 2022-11-17 DIAGNOSIS — Z17.0 MALIGNANT NEOPLASM OF OVERLAPPING SITES OF LEFT BREAST IN FEMALE, ESTROGEN RECEPTOR POSITIVE: Primary | ICD-10-CM

## 2022-11-17 PROCEDURE — 3079F PR MOST RECENT DIASTOLIC BLOOD PRESSURE 80-89 MM HG: ICD-10-PCS | Mod: CPTII,S$GLB,, | Performed by: PHYSICIAN ASSISTANT

## 2022-11-17 PROCEDURE — 3077F PR MOST RECENT SYSTOLIC BLOOD PRESSURE >= 140 MM HG: ICD-10-PCS | Mod: CPTII,S$GLB,, | Performed by: PHYSICIAN ASSISTANT

## 2022-11-17 PROCEDURE — 99214 PR OFFICE/OUTPT VISIT, EST, LEVL IV, 30-39 MIN: ICD-10-PCS | Mod: S$GLB,,, | Performed by: PHYSICIAN ASSISTANT

## 2022-11-17 PROCEDURE — 1101F PT FALLS ASSESS-DOCD LE1/YR: CPT | Mod: CPTII,S$GLB,, | Performed by: PHYSICIAN ASSISTANT

## 2022-11-17 PROCEDURE — 3077F SYST BP >= 140 MM HG: CPT | Mod: CPTII,S$GLB,, | Performed by: PHYSICIAN ASSISTANT

## 2022-11-17 PROCEDURE — 3288F PR FALLS RISK ASSESSMENT DOCUMENTED: ICD-10-PCS | Mod: CPTII,S$GLB,, | Performed by: PHYSICIAN ASSISTANT

## 2022-11-17 PROCEDURE — 99214 OFFICE O/P EST MOD 30 MIN: CPT | Mod: S$GLB,,, | Performed by: PHYSICIAN ASSISTANT

## 2022-11-17 PROCEDURE — 1160F RVW MEDS BY RX/DR IN RCRD: CPT | Mod: CPTII,S$GLB,, | Performed by: PHYSICIAN ASSISTANT

## 2022-11-17 PROCEDURE — 1101F PR PT FALLS ASSESS DOC 0-1 FALLS W/OUT INJ PAST YR: ICD-10-PCS | Mod: CPTII,S$GLB,, | Performed by: PHYSICIAN ASSISTANT

## 2022-11-17 PROCEDURE — 3008F BODY MASS INDEX DOCD: CPT | Mod: CPTII,S$GLB,, | Performed by: PHYSICIAN ASSISTANT

## 2022-11-17 PROCEDURE — 3288F FALL RISK ASSESSMENT DOCD: CPT | Mod: CPTII,S$GLB,, | Performed by: PHYSICIAN ASSISTANT

## 2022-11-17 PROCEDURE — 99999 PR PBB SHADOW E&M-EST. PATIENT-LVL III: CPT | Mod: PBBFAC,,, | Performed by: PHYSICIAN ASSISTANT

## 2022-11-17 PROCEDURE — 1159F PR MEDICATION LIST DOCUMENTED IN MEDICAL RECORD: ICD-10-PCS | Mod: CPTII,S$GLB,, | Performed by: PHYSICIAN ASSISTANT

## 2022-11-17 PROCEDURE — 1159F MED LIST DOCD IN RCRD: CPT | Mod: CPTII,S$GLB,, | Performed by: PHYSICIAN ASSISTANT

## 2022-11-17 PROCEDURE — 3008F PR BODY MASS INDEX (BMI) DOCUMENTED: ICD-10-PCS | Mod: CPTII,S$GLB,, | Performed by: PHYSICIAN ASSISTANT

## 2022-11-17 PROCEDURE — 3079F DIAST BP 80-89 MM HG: CPT | Mod: CPTII,S$GLB,, | Performed by: PHYSICIAN ASSISTANT

## 2022-11-17 PROCEDURE — 99999 PR PBB SHADOW E&M-EST. PATIENT-LVL III: ICD-10-PCS | Mod: PBBFAC,,, | Performed by: PHYSICIAN ASSISTANT

## 2022-11-17 PROCEDURE — 1160F PR REVIEW ALL MEDS BY PRESCRIBER/CLIN PHARMACIST DOCUMENTED: ICD-10-PCS | Mod: CPTII,S$GLB,, | Performed by: PHYSICIAN ASSISTANT

## 2022-11-17 RX ORDER — ESTRADIOL 2 MG/1
TABLET ORAL
COMMUNITY
Start: 2022-11-04 | End: 2023-04-05

## 2022-11-17 RX ORDER — BENZONATATE 100 MG/1
CAPSULE ORAL
COMMUNITY
Start: 2022-10-31

## 2022-11-17 NOTE — PROGRESS NOTES
[facility] Breast Surg  Breast Surgical Oncology  Follow-Up Patient Office Visit       Referring Provider: No ref. provider found   PCP: Katiuska Foster MD   Care Team:   Medical Oncologist: Dr. Scout Prasad  Radiation Oncologist: Dr. Lopez Prellop      Chief Complaint:   Chief Complaint   Patient presents with    Follow-up     Patient states that she's doing better since the last office visit, mild redness decreased in size, no swelling,no pain        Subjective:   Treatment History:  1. Left lumpectomy with left SLNB with bilateral breast reduction on 3/8/2021.  2. Taxol and Herceptin x's 12 weeks, completed 8/19/2021.  3. Maintenance Herceptin x's 1 year started 9/9/2021 - 5/19/2022  4. Adjuvant Radiation 9/2/2021 - 9/30/2021    Interval History:  11/17/2022 - Elina Paris returns today after undergoing 4 weeks of PT/OT for treatment of left breast lymphedema. With PT she has had resolution of pain symptoms and reports that prior skin thickening, swelling, and hardness is significantly improved. She thinks the redness has improved but it is still present. In the interval, she also had a BL DG MG for post lumpectomy protocol which was benign and found no suspicious masses, calcifications, or other signs of malignancy.    10/5/2022 - Patient returns today after being seen by radiation oncology with concerns regarding redness, pain, and intermittent swelling to the left breast, which she states has been present since the time of her surgery and when she finished radiation. The swelling and pain comes and goes. She denies swelling in the left arm. She had a L DG MG to evaluate breast pain concerns in about 1 month ago which were benign. There was no detrimental interval change in the mammographic appearance of the left breast and no sonographic evidence of a mass, fluid collection, or hyperemia to suggest inflammation.    HPI:  Elina Paris is a pleasant 70 Years old  Female who presents with AJCC 8th ed clinical anatomic stage IA / prognostic stage IA (cT1c cN0 M0) multifocal Left invasive ductal carcinoma grade 1 at 3 o'clock 8 cm FN ER 99.74%, GA 98.57%, HER2 2 - equivocal on IHC/FISH positive and left invasive ductal carcinoma, grade 2 at 3 o'clock 3 cm FN ER 98.93%, GA 0.0%, HER2 2- equivocal on IHC/FISH positive. She is SP Left Lumpectomy with SLNB on 3/8/2021. Final pathology revealed left breast invasive ductal carcinoma , grade 1 with one foci measuring 1.8 cm and another foci measuring 2.0 cm with associated foci of DCIS. All margins were clear. Two sentinel lymph nodes negative for metastatic carcinoma. The patient subsequently had a bilateral breast reduction. Due to HER-2 being positive, we treated with Taxol and Herceptin weekly for 12 weeks, completed on 2021. She will then complete a years worth of Herceptin, started on 2021. Radiation started on 2021, finished on 2021.      Imagin. 2020 BL SC MG at [facility] - which revealed multiple morphologically oval/circumscribed masses throughout both breasts which have changed in size or resolved after review of prior images, in the left breast UOQ mid depth and UOQ anterior depth two focal asymmetries are seen, and a left breast asymmetry anterior and close to the skin. No other significant masses, calcifications, or other findings in either breast. BI-RADS 0: additional imaging is recommended.    2. 21 LEFT DG MG / US LEFT BREAST LIMITED at [facility] - which revealed on L MG at 3 o'clock mid depth a 1.6 cm irregular mass, at 3 o'clock anterior depth 1.0 cm oval mass with obscured margins, and at 2 o'clock anterior depth a 0.6 cm irregular mass. On L US at 3 o'clock 8 cm FN a 1.6 cm x 1.2 cm x 1.2 cm irregular mass with angular.microlobulated margins/posterior shadowing, 3 o'clock 3 cm FN a 1.0 cm x 1.0 cm x 0.7 cm oval circumscribed mass with internal septation (consisted with complicated cyst),  and 2 o'clock 5 cm FN a 0.6 cm x 0.5 cm x 0.7 cm avascular with peripheral hyperechogenicity and central hypoechogenicity and indistinct margins.    BI-RADS 4: suspicious and biopsy was recommended for 3 o'clock 8 cm FN mass and 2 o'clock 5 cm FN. US-guided aspiration of 3 o'clock 3 cm FN cyst    3. 2/25/2021 BL Breast MRI at [facility] - which revealed biopsy proven malignancy at 3 o'clock in the left breast spanning 5.7 cm without suspicious enhancement in either breast. BI-RADS 6: biopsy proven malignancy.    3. 11/8/2021 BL DG MG - BENIGN: no mammographic evidence of malignancy. Benign post surgical changes are present bilaterally.     4. 8/17/2022 L DG MG and L Breast US to evaluate left breast pain and for post lumpectomy protocol - BIRADS 2: BENIGN: Mammogram: On today's mammographic views, there are expected, benign post-therapy changes of the left breast related to prior lumpectomy, reduction mammoplasty, and radiation therapy.  No detrimental interval change in the mammographic appearance of the left breast.  No mammographic correlate for the left breast upper outer quadrant/axillary tail area of concern (pain). Ultrasound: Targeted in area of pain (11:00 axis 8 cm FN), revealing no suspicious finding.  At the patient's area of concern, there is normal fatty tissue and expected postsurgical changes.  No sonographic evidence of a mass, fluid collection, or hyperemia to suggest inflammation.  No suspicious left axillary adenopathy.     5. 11/9/2022 BL DG MG - BIRADS 2: BENIGN: No suspicious masses, calcifications, or other signs of malignancy are identified.  Benign postlumpectomy and post reduction changes are noted in the left breast.  Benign post reduction changes are also noted in the right breast.  A subcutaneous infusion port overlies the right axilla.  No significant interval change compared to the prior examination.      Pathology:  1. Ultrasound-guided Core Biopsy 3 o'clock 8 cm FN - Invasive ductal  carcinoma, grade 1  ER 99.74%  CT 98.57%  HER2 SOFIA 2 + equivocal on IHC/FISH positive  2. Ultrasound-guided Core Biopsy 3 o'clock 3 cm FN - Invasive ductal carcinoma, grade 2  ER 98.93%  CT 0.0%  HER2 SOFIA 2 + equivocal on IHC/FISH positive  3. Ultrasound-guided Core Biopsy 2 o'clock 5 cm FN - Fragments of benign breast tissue and adipose tissue  4. 3/8/2021 Left Lumpectomy with Left [place] Lymph Node Biopsy revealed left breast invasive ductal carcinoma , grade 1 with one foci measuring 1.8 cm and another foci measuring 2.0 cm with associated foci of DCIS. All margins were clear. Two sentinel lymph nodes negative for metastatic carcinoma.       OB/GYN History:  Menarche Onset: 11  Menopause: Post  Hormonal birth control (duration): no  Pregnancies: none  Age at first pregnancy: n/a  Child births: 0  Breastfeeding duration: no   Hysterectomy: yes  Oophorectomy: ?  HRT: no    Other:  MG breast density: Category B (Scattered fibroglandular)  Prior thoracic RT: none prior to breast cancer dx  Genetic testing: none  Ashkenazi Zoroastrian descent: No      Family History:  Family History   Problem Relation Age of Onset    Cancer Mother 68    Coronary artery disease Mother     Hypertension Mother     Hypertension Father     Coronary artery disease Father     Cancer Brother     Hypertension Brother         Patient History:  Past Medical History:   Diagnosis Date    Anxiety     Breast cancer     CAD (coronary artery disease)     COPD (chronic obstructive pulmonary disease)     GERD (gastroesophageal reflux disease)     HLD (hyperlipidemia)     Hypertension     Hypothyroidism        Past Surgical History:   Procedure Laterality Date    CHOLECYSTECTOMY      COLONOSCOPY  07/06/2016    HYSTERECTOMY      MASTECTOMY, PARTIAL  03/08/2021    MEDIPORT INSERTION, SINGLE  08/2021    REDUCTION OF BOTH BREASTS Bilateral 03/08/2021    TONSILLECTOMY      TOTAL KNEE ARTHROPLASTY Bilateral        Social History     Socioeconomic History     Marital status:    Tobacco Use    Smoking status: Former     Types: Cigarettes    Smokeless tobacco: Never   Substance and Sexual Activity    Alcohol use: Never    Drug use: Never       Immunization History   Administered Date(s) Administered    COVID-19, MRNA, LN-S, PF (Pfizer) (Purple Cap) 01/13/2021, 02/03/2021       Medications/Allergies:  Current Outpatient Medications on File Prior to Visit   Medication Sig Dispense Refill    acetaminophen (TYLENOL) 500 MG tablet Take 500 mg by mouth every 6 (six) hours as needed.      albuterol (PROVENTIL/VENTOLIN HFA) 90 mcg/actuation inhaler INHALE 2 PUFFS INTO THE LUNGS EVERY 6 HOURS AS NEEDED FOR WHEEZE      amoxicillin (AMOXIL) 500 MG capsule Take 1,000 mg by mouth 2 (two) times daily.      anastrozole (ARIMIDEX) 1 mg Tab TAKE 1 TABLET BY MOUTH EVERY DAY 90 tablet 1    aspirin (ECOTRIN) 81 MG EC tablet Take 81 mg by mouth.      atorvastatin (LIPITOR) 40 MG tablet Take 40 mg by mouth once daily.      benzonatate (TESSALON) 100 MG capsule TAKE 1 CAPSULE (100 MG) BY ORAL ROUTE 3 TIMES PER DAY AS NEEDED FOR COUGH      diltiaZEM HCl (TIAZAC) 300 mg 24 hr capsule Take 1 capsule by mouth once daily.      esomeprazole (NEXIUM) 40 MG capsule Take 40 mg by mouth as needed.      estradioL (ESTRACE) 2 MG tablet       levothyroxine (SYNTHROID) 50 MCG tablet Take 1 tablet by mouth once daily.      nitroGLYCERIN (NITROSTAT) 0.4 MG SL tablet Place 0.4 mg under the tongue.      traMADoL (ULTRAM) 50 mg tablet TAKE 1 TABLET BY MOUTH EVERY 12 HOURS AS NEEDED FOR PAIN      TRELEGY ELLIPTA 100-62.5-25 mcg DsDv TAKE 1 PUFF BY MOUTH EVERY DAY      triamterene-hydrochlorothiazide 37.5-25 mg (MAXZIDE-25) 37.5-25 mg per tablet Take 1 tablet by mouth once daily.       No current facility-administered medications on file prior to visit.       Review of patient's allergies indicates:  No Known Allergies    Review of Systems:  Pertinent items are noted in HPI.     Objective:      Vitals:  Vitals:    11/17/22 1014   BP: (!) 146/83   Pulse: 85   Resp: 20   Temp: 98 °F (36.7 °C)       Body mass index is 37.27 kg/m².     Physical Exam:  General: The patient is awake, alert and oriented times three. The patient is well nourished and in no acute distress.  Neck: There is no evidence of palpable cervical, supraclavicular or axillary adenopathy. The neck is supple. The thyroid is not enlarged.  Musculoskeletal: The patient has a normal range of motion of her bilateral upper extremities.  Chest: Examination of the chest wall fails to reveal any obvious abnormalities. Nonlabored breathing, symmetric expansion.  Breast:  Right: There are well healed incisions from her breast reduction. There is mild diffuse tenderness to the breast. Examination of right breast fails to reveal any dominant masses or areas of significant focal nodularity. The nipple is everted without evidence of discharge. There is no skin dimpling with movement of the pectoralis. There are no significant skin changes overlying the breast.   Left: There are well healed incisions from her breast reduction. There is diffuse hyperpigmentation as well as mild residual redness to the areola and surrounding skin (see image). This was previously reported on physical exam 5/2022 and reportedly present since radiation. Redness is well demarcated and only present where radiation treatments affected skin. Otherwise, examination of the left breast fails to reveal any dominant masses or areas of significant focal nodularity. The nipple is everted without evidence of discharge. There is no skin dimpling with movement of the pectoralis. There are no significant skin changes overlying the breast.  Abdomen: The abdomen is soft, flat, nontender and nondistended.  Integumentary: no rashes or skin lesions present  Neurologic: cranial nerves intact, no signs of peripheral neurological deficit, motor/sensory function intact          Assessment and Plan:        Elina was seen today for follow-up.    Diagnoses and all orders for this visit:    Malignant neoplasm of overlapping sites of left breast in female, estrogen receptor positive  -     Mammo Digital Diagnostic Bilat with [facility]; Future    Lymphedema of breast    History of partial mastectomy of left breast    History of bilateral breast reduction surgery            Plan:     1. Continue OT/PT for lymphedema management.    2. Discussed that I'd like to perform punch biopsy because of continued redness. Due to chronicity and no issues on mammogram, I believe this is likely related to radiation changes and lymphedema but will perform punch biopsy for definitive management. She will RTC in about 2-3 weeks for re-evaluation of redness and punch biopsy.    3. BL DG MG in 1 year (November 2023).    4. Healthy lifestyle guidelines were reviewed. She was encouraged to engage in regular exercise, maintain a healthy body weight, and avoid excessive alcohol consumption. Healthy nutritional guidelines were also discussed. Self-breast examination was reviewed with the patient in detail and she was encouraged to perform this on a monthly basis.          All of her questions were answered.     Nadeen Philip PA-C            Total time on the date of the visit ranged from 30-39 mins ([id number]). Total time includes both face-to-face and non-face-to-face time personally spent by myself on the day of the visit.    Non-face-to-face time included:  _X_ preparing to see the patient such as reviewing the patient record  __ obtaining and reviewing separately obtained history  _X_ independently interpreting results  _X_ documenting clinical information in electronic health record.    Face-to-face time included:  _X_ performing an appropriate history and examination  _X_ communicating results to the patient  _X_ counseling and educating the patient  __ ordering appropriate medications  __ ordering appropriate tests  _X_ ordering  appropriate procedures (including follow-up)  _X_ answering any questions the patient had    Total Time spent on date of visit: 35 minutes            Pain and swelling/hardness and skin thickening improved. But redness still present.    RTC for punch biopsy

## 2022-12-07 ENCOUNTER — PROCEDURE VISIT (OUTPATIENT)
Dept: SURGERY | Facility: CLINIC | Age: 72
End: 2022-12-07
Payer: MEDICARE

## 2022-12-07 VITALS
DIASTOLIC BLOOD PRESSURE: 78 MMHG | RESPIRATION RATE: 18 BRPM | WEIGHT: 211.19 LBS | BODY MASS INDEX: 37.42 KG/M2 | TEMPERATURE: 98 F | HEIGHT: 63 IN | OXYGEN SATURATION: 96 % | SYSTOLIC BLOOD PRESSURE: 140 MMHG | HEART RATE: 80 BPM

## 2022-12-07 DIAGNOSIS — Z85.3 PERSONAL HISTORY OF BREAST CANCER: ICD-10-CM

## 2022-12-07 DIAGNOSIS — Z17.0 MALIGNANT NEOPLASM OF OVERLAPPING SITES OF LEFT BREAST IN FEMALE, ESTROGEN RECEPTOR POSITIVE: ICD-10-CM

## 2022-12-07 DIAGNOSIS — C50.812 MALIGNANT NEOPLASM OF OVERLAPPING SITES OF LEFT BREAST IN FEMALE, ESTROGEN RECEPTOR POSITIVE: ICD-10-CM

## 2022-12-07 DIAGNOSIS — N63.0 BREAST SWELLING: Primary | ICD-10-CM

## 2022-12-07 PROCEDURE — 88305 TISSUE EXAM BY PATHOLOGIST: CPT | Performed by: PHYSICIAN ASSISTANT

## 2022-12-07 PROCEDURE — 11104 PUNCH BX SKIN SINGLE LESION: CPT | Mod: S$GLB,,, | Performed by: PHYSICIAN ASSISTANT

## 2022-12-07 PROCEDURE — 11104 PR PUNCH BIOPSY, SKIN, SINGLE LESION: ICD-10-PCS | Mod: S$GLB,,, | Performed by: PHYSICIAN ASSISTANT

## 2022-12-07 RX ORDER — TRAZODONE HYDROCHLORIDE 50 MG/1
50 TABLET ORAL NIGHTLY
COMMUNITY
Start: 2022-12-05

## 2022-12-07 RX ORDER — OMEPRAZOLE 40 MG/1
40 CAPSULE, DELAYED RELEASE ORAL
COMMUNITY
Start: 2022-11-25

## 2022-12-07 NOTE — PROCEDURES
Punch Biopsy Procedure Note    Pre-operative Diagnosis: left breast skin thickening    Post-operative Diagnosis: same    Location: left breast, 8-9:00 position, about 4 cmfn    Anesthesia: 1% plain lidocaine    Procedure Details   The Procedure, risks and complications have been discussed in detail (including, but not limited to pain, infection, bleeding) with the patient, and the patient has signed consent to have the surgery completed.    The skin was sterilely prepped and draped over the affected area in the usual fashion.  Local anesthetic was injected in the affected area.  Next, using a 4 mm punch, a small skin sample was obtained and sent to pathology for permanent sectioning.  A 3-0 chromic stitch was used to approximate the skin edges.  Dressing was applied.  Patient tolerated well.     EBL: minimal    Condition:  Stable    Complications:  none.

## 2022-12-10 LAB
DHEA SERPL-MCNC: NORMAL
ESTROGEN SERPL-MCNC: NORMAL PG/ML
INSULIN SERPL-ACNC: NORMAL U[IU]/ML
LAB AP CLINICAL INFORMATION: NORMAL
LAB AP GROSS DESCRIPTION: NORMAL
LAB AP REPORT FOOTNOTES: NORMAL
T3RU NFR SERPL: NORMAL %

## 2022-12-12 ENCOUNTER — TELEPHONE (OUTPATIENT)
Dept: SURGERY | Facility: CLINIC | Age: 72
End: 2022-12-12
Payer: MEDICARE

## 2022-12-12 NOTE — TELEPHONE ENCOUNTER
Patient called with her pathology results.      ----- Message from ANAIS Espinosa sent at 12/12/2022  7:16 AM CST -----  Please call patient to let her know that her pathology is benign and reveals changes consistent with radiation therapy changes to the skin.

## 2022-12-21 ENCOUNTER — OFFICE VISIT (OUTPATIENT)
Dept: SURGERY | Facility: CLINIC | Age: 72
End: 2022-12-21
Payer: MEDICARE

## 2022-12-21 VITALS
SYSTOLIC BLOOD PRESSURE: 132 MMHG | OXYGEN SATURATION: 95 % | WEIGHT: 208.38 LBS | RESPIRATION RATE: 18 BRPM | TEMPERATURE: 98 F | DIASTOLIC BLOOD PRESSURE: 81 MMHG | BODY MASS INDEX: 36.92 KG/M2 | HEART RATE: 88 BPM | HEIGHT: 63 IN

## 2022-12-21 DIAGNOSIS — Z17.0 MALIGNANT NEOPLASM OF OVERLAPPING SITES OF LEFT BREAST IN FEMALE, ESTROGEN RECEPTOR POSITIVE: ICD-10-CM

## 2022-12-21 DIAGNOSIS — I89.0 LYMPHEDEMA OF BREAST: Primary | ICD-10-CM

## 2022-12-21 DIAGNOSIS — C50.812 MALIGNANT NEOPLASM OF OVERLAPPING SITES OF LEFT BREAST IN FEMALE, ESTROGEN RECEPTOR POSITIVE: ICD-10-CM

## 2022-12-21 PROCEDURE — 3008F BODY MASS INDEX DOCD: CPT | Mod: CPTII,S$GLB,, | Performed by: PHYSICIAN ASSISTANT

## 2022-12-21 PROCEDURE — 3075F PR MOST RECENT SYSTOLIC BLOOD PRESS GE 130-139MM HG: ICD-10-PCS | Mod: CPTII,S$GLB,, | Performed by: PHYSICIAN ASSISTANT

## 2022-12-21 PROCEDURE — 3079F PR MOST RECENT DIASTOLIC BLOOD PRESSURE 80-89 MM HG: ICD-10-PCS | Mod: CPTII,S$GLB,, | Performed by: PHYSICIAN ASSISTANT

## 2022-12-21 PROCEDURE — 99213 OFFICE O/P EST LOW 20 MIN: CPT | Mod: S$GLB,,, | Performed by: PHYSICIAN ASSISTANT

## 2022-12-21 PROCEDURE — 1159F PR MEDICATION LIST DOCUMENTED IN MEDICAL RECORD: ICD-10-PCS | Mod: CPTII,S$GLB,, | Performed by: PHYSICIAN ASSISTANT

## 2022-12-21 PROCEDURE — 3079F DIAST BP 80-89 MM HG: CPT | Mod: CPTII,S$GLB,, | Performed by: PHYSICIAN ASSISTANT

## 2022-12-21 PROCEDURE — 1126F AMNT PAIN NOTED NONE PRSNT: CPT | Mod: CPTII,S$GLB,, | Performed by: PHYSICIAN ASSISTANT

## 2022-12-21 PROCEDURE — 1159F MED LIST DOCD IN RCRD: CPT | Mod: CPTII,S$GLB,, | Performed by: PHYSICIAN ASSISTANT

## 2022-12-21 PROCEDURE — 99999 PR PBB SHADOW E&M-EST. PATIENT-LVL IV: CPT | Mod: PBBFAC,,, | Performed by: PHYSICIAN ASSISTANT

## 2022-12-21 PROCEDURE — 1126F PR PAIN SEVERITY QUANTIFIED, NO PAIN PRESENT: ICD-10-PCS | Mod: CPTII,S$GLB,, | Performed by: PHYSICIAN ASSISTANT

## 2022-12-21 PROCEDURE — 99999 PR PBB SHADOW E&M-EST. PATIENT-LVL IV: ICD-10-PCS | Mod: PBBFAC,,, | Performed by: PHYSICIAN ASSISTANT

## 2022-12-21 PROCEDURE — 3008F PR BODY MASS INDEX (BMI) DOCUMENTED: ICD-10-PCS | Mod: CPTII,S$GLB,, | Performed by: PHYSICIAN ASSISTANT

## 2022-12-21 PROCEDURE — 3075F SYST BP GE 130 - 139MM HG: CPT | Mod: CPTII,S$GLB,, | Performed by: PHYSICIAN ASSISTANT

## 2022-12-21 PROCEDURE — 99213 PR OFFICE/OUTPT VISIT, EST, LEVL III, 20-29 MIN: ICD-10-PCS | Mod: S$GLB,,, | Performed by: PHYSICIAN ASSISTANT

## 2022-12-21 RX ORDER — ESOMEPRAZOLE MAGNESIUM 40 MG/1
1 CAPSULE, DELAYED RELEASE ORAL EVERY MORNING
COMMUNITY

## 2022-12-21 RX ORDER — ONDANSETRON 4 MG/1
4 TABLET, ORALLY DISINTEGRATING ORAL EVERY 8 HOURS PRN
COMMUNITY
Start: 2022-12-19

## 2022-12-21 RX ORDER — CEFDINIR 300 MG/1
300 CAPSULE ORAL 2 TIMES DAILY
COMMUNITY
Start: 2022-12-19

## 2022-12-21 RX ORDER — CODEINE PHOSPHATE AND GUAIFENESIN 10; 100 MG/5ML; MG/5ML
5 SOLUTION ORAL 3 TIMES DAILY PRN
COMMUNITY
Start: 2022-12-19 | End: 2022-12-26

## 2022-12-21 NOTE — PROGRESS NOTES
[facility] Breast Surg  Breast Surgical Oncology  Follow-Up Patient Office Visit       Referring Provider: No ref. provider found   PCP: Katiuska Foster MD   Care Team:   Medical Oncologist: Dr. Scout Prasad  Radiation Oncologist: Dr. Jessica Renee    Chief Complaint:   Chief Complaint   Patient presents with    Follow-up     Follow up for Left breast punch biopsy visit.         Subjective:   Treatment History:  1. Left lumpectomy with left SLNB with bilateral breast reduction on 3/8/2021.  2. Taxol and Herceptin x's 12 weeks, completed 8/19/2021.  3. Maintenance Herceptin x's 1 year started 9/9/2021 - 5/19/2022  4. Adjuvant Radiation 9/2/2021 - 9/30/2021    Interval History:  12/21/2022 - Elina Paris returns today for follow up after punch biopsy. Pathology was benign and consistent with thickened fibrotic dermis. This would be related to changes from radiation and lymphedema of the breast.    HPI:  Elina Paris is a pleasant 70 Years old Female who presents with AJCC 8th ed clinical anatomic stage IA / prognostic stage IA (cT1c cN0 M0) multifocal Left invasive ductal carcinoma grade 1 at 3 o'clock 8 cm FN ER 99.74%, IA 98.57%, HER2 2 - equivocal on IHC/FISH positive and left invasive ductal carcinoma, grade 2 at 3 o'clock 3 cm FN ER 98.93%, IA 0.0%, HER2 2- equivocal on IHC/FISH positive. She is SP Left Lumpectomy with SLNB on 3/8/2021. Final pathology revealed left breast invasive ductal carcinoma , grade 1 with one foci measuring 1.8 cm and another foci measuring 2.0 cm with associated foci of DCIS. All margins were clear. Two sentinel lymph nodes negative for metastatic carcinoma. The patient subsequently had a bilateral breast reduction. Due to HER-2 being positive, we treated with Taxol and Herceptin weekly for 12 weeks, completed on 8/19/2021. She will then complete a years worth of Herceptin, started on 9/9/2021. Radiation started on 9/2/2021, finished on  2021.    She returned 10/2022 after being seen by radiation oncology with concerns regarding redness, pain, and intermittent swelling to the left breast, which she states has been present since the time of her surgery and when she finished radiation. The swelling and pain comes and goes. She denies swelling in the left arm. She had a L DG MG to evaluate breast pain concerns in about 1 month ago which were benign. There was no detrimental interval change in the mammographic appearance of the left breast and no sonographic evidence of a mass, fluid collection, or hyperemia to suggest inflammation. She was referred to PT/OT for treatment of left breast lymphedema. With 4 weeks of therapy, she achieved resolution of pain symptoms and reports that prior skin thickening, swelling, and hardness significantly improved. However, redness it is still present and she was recommended for biopsy (benign findings).    Imagin. 2020 BL SC MG at [facility] - which revealed multiple morphologically oval/circumscribed masses throughout both breasts which have changed in size or resolved after review of prior images, in the left breast UOQ mid depth and UOQ anterior depth two focal asymmetries are seen, and a left breast asymmetry anterior and close to the skin. No other significant masses, calcifications, or other findings in either breast. BI-RADS 0: additional imaging is recommended.    2. 21 LEFT DG MG / US LEFT BREAST LIMITED at [facility] - which revealed on L MG at 3 o'clock mid depth a 1.6 cm irregular mass, at 3 o'clock anterior depth 1.0 cm oval mass with obscured margins, and at 2 o'clock anterior depth a 0.6 cm irregular mass. On L US at 3 o'clock 8 cm FN a 1.6 cm x 1.2 cm x 1.2 cm irregular mass with angular.microlobulated margins/posterior shadowing, 3 o'clock 3 cm FN a 1.0 cm x 1.0 cm x 0.7 cm oval circumscribed mass with internal septation (consisted with complicated cyst), and 2 o'clock 5 cm FN a 0.6 cm x 0.5  cm x 0.7 cm avascular with peripheral hyperechogenicity and central hypoechogenicity and indistinct margins.    BI-RADS 4: suspicious and biopsy was recommended for 3 o'clock 8 cm FN mass and 2 o'clock 5 cm FN. US-guided aspiration of 3 o'clock 3 cm FN cyst    3. 2/25/2021 BL Breast MRI at [facility] - which revealed biopsy proven malignancy at 3 o'clock in the left breast spanning 5.7 cm without suspicious enhancement in either breast. BI-RADS 6: biopsy proven malignancy.    3. 11/8/2021 BL DG MG - BENIGN: no mammographic evidence of malignancy. Benign post surgical changes are present bilaterally.     4. 8/17/2022 L DG MG and L Breast US to evaluate left breast pain and for post lumpectomy protocol - BIRADS 2: BENIGN: Mammogram: On today's mammographic views, there are expected, benign post-therapy changes of the left breast related to prior lumpectomy, reduction mammoplasty, and radiation therapy.  No detrimental interval change in the mammographic appearance of the left breast.  No mammographic correlate for the left breast upper outer quadrant/axillary tail area of concern (pain). Ultrasound: Targeted in area of pain (11:00 axis 8 cm FN), revealing no suspicious finding.  At the patient's area of concern, there is normal fatty tissue and expected postsurgical changes.  No sonographic evidence of a mass, fluid collection, or hyperemia to suggest inflammation.  No suspicious left axillary adenopathy.     5. 11/9/2022 BL DG MG - BIRADS 2: BENIGN: No suspicious masses, calcifications, or other signs of malignancy are identified.  Benign postlumpectomy and post reduction changes are noted in the left breast.  Benign post reduction changes are also noted in the right breast.  A subcutaneous infusion port overlies the right axilla.  No significant interval change compared to the prior examination.      Pathology:  1. Ultrasound-guided Core Biopsy 3 o'clock 8 cm FN - Invasive ductal carcinoma, grade 1  ER 99.74%  AK  98.57%  HER2 SOFIA 2 + equivocal on IHC/FISH positive    2. Ultrasound-guided Core Biopsy 3 o'clock 3 cm FN - Invasive ductal carcinoma, grade 2  ER 98.93%  OK 0.0%  HER2 SOFIA 2 + equivocal on IHC/FISH positive    3. Ultrasound-guided Core Biopsy 2 o'clock 5 cm FN - Fragments of benign breast tissue and adipose tissue  4. 3/8/2021 Left Lumpectomy with Left [place] Lymph Node Biopsy revealed left breast invasive ductal carcinoma , grade 1 with one foci measuring 1.8 cm and another foci measuring 2.0 cm with associated foci of DCIS. All margins were clear. Two sentinel lymph nodes negative for metastatic carcinoma. The skin appears to show thickened fibrotic dermis which extends approximately 5-6 mm from the epidermal surface. The deeper fibrous tissue envelopes deep eccrine glands.    3. Skin, left breast (punch biopsy) - Skin with prominent dermal fibrous tissue. No evidence of malignancy. No glandular breast tissue identified. Only sparse perivascular lymphocytes are identified. Clinicopathologic correlation recommended.    OB/GYN History:  Menarche Onset: 11  Menopause: Post  Hormonal birth control (duration): no  Pregnancies: none  Age at first pregnancy: n/a  Child births: 0  Breastfeeding duration: no   Hysterectomy: yes  Oophorectomy: ?  HRT: no    Other:  MG breast density: Category B (Scattered fibroglandular)  Prior thoracic RT: none prior to breast cancer dx  Genetic testing: none  Ashkenazi Judaism descent: No      Family History:  Family History   Problem Relation Age of Onset    Cancer Mother 68    Coronary artery disease Mother     Hypertension Mother     Hypertension Father     Coronary artery disease Father     Cancer Brother     Hypertension Brother         Patient History:  Past Medical History:   Diagnosis Date    Anxiety     Breast cancer     CAD (coronary artery disease)     COPD (chronic obstructive pulmonary disease)     GERD (gastroesophageal reflux disease)     HLD (hyperlipidemia)      Hypertension     Hypothyroidism        Past Surgical History:   Procedure Laterality Date    CHOLECYSTECTOMY      COLONOSCOPY  07/06/2016    HYSTERECTOMY      MASTECTOMY, PARTIAL  03/08/2021    MEDIPORT INSERTION, SINGLE  08/2021    REDUCTION OF BOTH BREASTS Bilateral 03/08/2021    TONSILLECTOMY      TOTAL KNEE ARTHROPLASTY Bilateral        Social History     Socioeconomic History    Marital status:    Tobacco Use    Smoking status: Former     Types: Cigarettes    Smokeless tobacco: Never   Substance and Sexual Activity    Alcohol use: Never    Drug use: Never       Immunization History   Administered Date(s) Administered    COVID-19, MRNA, LN-S, PF (Pfizer) (Purple Cap) 01/13/2021, 02/03/2021       Medications/Allergies:  Current Outpatient Medications on File Prior to Visit   Medication Sig Dispense Refill    acetaminophen (TYLENOL) 500 MG tablet Take 500 mg by mouth every 6 (six) hours as needed.      albuterol (PROVENTIL/VENTOLIN HFA) 90 mcg/actuation inhaler INHALE 2 PUFFS INTO THE LUNGS EVERY 6 HOURS AS NEEDED FOR WHEEZE      amoxicillin (AMOXIL) 500 MG capsule Take 1,000 mg by mouth 2 (two) times daily.      anastrozole (ARIMIDEX) 1 mg Tab TAKE 1 TABLET BY MOUTH EVERY DAY 90 tablet 1    aspirin (ECOTRIN) 81 MG EC tablet Take 81 mg by mouth.      atorvastatin (LIPITOR) 40 MG tablet Take 40 mg by mouth once daily.      benzonatate (TESSALON) 100 MG capsule TAKE 1 CAPSULE (100 MG) BY ORAL ROUTE 3 TIMES PER DAY AS NEEDED FOR COUGH      cefdinir (OMNICEF) 300 MG capsule Take 300 mg by mouth 2 (two) times daily.      diltiaZEM HCl (TIAZAC) 300 mg 24 hr capsule Take 1 capsule by mouth once daily.      esomeprazole (NEXIUM) 40 MG capsule Take 1 capsule by mouth every morning.      estradioL (ESTRACE) 2 MG tablet       guaiFENesin-codeine 100-10 mg/5 ml (TUSSI-ORGANIDIN NR)  mg/5 mL syrup Take 5 mLs by mouth 3 (three) times daily as needed.      levothyroxine (SYNTHROID) 50 MCG tablet Take 1 tablet by  mouth once daily.      linaCLOtide (LINZESS) 290 mcg Cap capsule Take 290 mcg by mouth.      nitroGLYCERIN (NITROSTAT) 0.4 MG SL tablet Place 0.4 mg under the tongue.      omeprazole (PRILOSEC) 40 MG capsule Take 40 mg by mouth.      ondansetron (ZOFRAN-ODT) 4 MG TbDL Take 4 mg by mouth every 8 (eight) hours as needed.      traMADoL (ULTRAM) 50 mg tablet TAKE 1 TABLET BY MOUTH EVERY 12 HOURS AS NEEDED FOR PAIN      traZODone (DESYREL) 50 MG tablet Take 50 mg by mouth every evening.      TRELEGY ELLIPTA 100-62.5-25 mcg DsDv TAKE 1 PUFF BY MOUTH EVERY DAY      triamterene-hydrochlorothiazide 37.5-25 mg (MAXZIDE-25) 37.5-25 mg per tablet Take 1 tablet by mouth once daily.       No current facility-administered medications on file prior to visit.       Review of patient's allergies indicates:  No Known Allergies    Review of Systems:  Pertinent items are noted in HPI.     Objective:     Vitals:  Vitals:    12/21/22 0844   BP: 132/81   Pulse: 88   Resp: 18   Temp: 98.4 °F (36.9 °C)         Body mass index is 36.92 kg/m².     Physical Exam:  General: The patient is awake, alert and oriented times three. The patient is well nourished and in no acute distress.  Neck: There is no evidence of palpable cervical, supraclavicular or axillary adenopathy. The neck is supple. The thyroid is not enlarged.  Musculoskeletal: The patient has a normal range of motion of her bilateral upper extremities.  Chest: Examination of the chest wall fails to reveal any obvious abnormalities. Nonlabored breathing, symmetric expansion.  Breast:  Right: There are well healed incisions from her breast reduction. There is mild diffuse tenderness to the breast. Examination of right breast fails to reveal any dominant masses or areas of significant focal nodularity. The nipple is everted without evidence of discharge. There is no skin dimpling with movement of the pectoralis. There are no significant skin changes overlying the breast.   Left: There are  well healed incisions from her breast reduction. There is diffuse hyperpigmentation as well as mild residual redness to the areola and surrounding skin (see image). This was previously reported on physical exam 5/2022 and reportedly present since radiation. Redness is well demarcated and only present where radiation treatments affected skin. Otherwise, examination of the left breast fails to reveal any dominant masses or areas of significant focal nodularity. The nipple is everted without evidence of discharge. There is no skin dimpling with movement of the pectoralis. There are no significant skin changes overlying the breast.  Abdomen: The abdomen is soft, flat, nontender and nondistended.  Integumentary: no rashes or skin lesions present  Neurologic: cranial nerves intact, no signs of peripheral neurological deficit, motor/sensory function intact          Assessment and Plan:       Elina was seen today for follow-up.    Diagnoses and all orders for this visit:    Lymphedema of breast    Malignant neoplasm of overlapping sites of left breast in female, estrogen receptor positive        Suture removed today. Pathology results discussed.        Plan:     1. Continue OT/PT for lymphedema management as needed.    2. Punch biopsy of area of concern was benign and consistent with radiation and lymphedema changes. Will continue to monitor. She was advised to call if symptoms worsen or if she notices any other changes/symptoms to the breasts.    3. BL DG MG in 1 year (November 2023). This is already scheduled. RTC in 6 months for CBE.    4. Healthy lifestyle guidelines were reviewed. She was encouraged to engage in regular exercise, maintain a healthy body weight, and avoid excessive alcohol consumption. Healthy nutritional guidelines were also discussed. Self-breast examination was reviewed with the patient in detail and she was encouraged to perform this on a monthly basis.    5. Review of her medications shows  estradiol (estrace). She is not sure if she takes this. This may be a medication on her historical record which she no longer takes. She plans to go home today and find her list of current medications and will call us so we can verify if she is taking this medicine. If she is, I recommend discontinuing Estrace.          All of her questions were answered.     Nadeen Philip PA-C          --------------------------------------------------------------------------------------------------------------  Total time on the date of the visit ranged from 20-29 mins (11160). Total time includes both face-to-face and non-face-to-face time personally spent by myself on the day of the visit.    Non-face-to-face time included:  _X_ preparing to see the patient such as reviewing the patient record  __ obtaining and reviewing separately obtained history  _X_ independently interpreting results  _X_ documenting clinical information in electronic health record.    Face-to-face time included:  _X_ performing an appropriate history and examination  _X_ communicating results to the patient  _X_ counseling and educating the patient  __ ordering appropriate medications  __ ordering appropriate tests  _X_ ordering appropriate procedures (including follow-up)  _X_ answering any questions the patient had    Total Time spent on date of visit: 20 minutes

## 2022-12-30 ENCOUNTER — LAB VISIT (OUTPATIENT)
Dept: LAB | Facility: HOSPITAL | Age: 72
End: 2022-12-30
Attending: INTERNAL MEDICINE
Payer: MEDICARE

## 2022-12-30 DIAGNOSIS — C50.912 MALIGNANT NEOPLASM OF LEFT BREAST IN FEMALE, ESTROGEN RECEPTOR POSITIVE, UNSPECIFIED SITE OF BREAST: ICD-10-CM

## 2022-12-30 DIAGNOSIS — Z17.0 MALIGNANT NEOPLASM OF LEFT BREAST IN FEMALE, ESTROGEN RECEPTOR POSITIVE, UNSPECIFIED SITE OF BREAST: ICD-10-CM

## 2022-12-30 LAB
ALBUMIN SERPL-MCNC: 3.6 G/DL (ref 3.4–4.8)
ALBUMIN/GLOB SERPL: 1.1 RATIO (ref 1.1–2)
ALP SERPL-CCNC: 134 UNIT/L (ref 40–150)
ALT SERPL-CCNC: 20 UNIT/L (ref 0–55)
AST SERPL-CCNC: 17 UNIT/L (ref 5–34)
BASOPHILS # BLD AUTO: 0.01 X10(3)/MCL (ref 0–0.2)
BASOPHILS NFR BLD AUTO: 0.1 %
BILIRUBIN DIRECT+TOT PNL SERPL-MCNC: 0.6 MG/DL
BUN SERPL-MCNC: 22.9 MG/DL (ref 9.8–20.1)
CALCIUM SERPL-MCNC: 10.1 MG/DL (ref 8.4–10.2)
CEA SERPL-MCNC: <1.73 NG/ML (ref 0–3)
CHLORIDE SERPL-SCNC: 108 MMOL/L (ref 98–107)
CO2 SERPL-SCNC: 27 MMOL/L (ref 23–31)
CREAT SERPL-MCNC: 1.11 MG/DL (ref 0.55–1.02)
EOSINOPHIL # BLD AUTO: 0.19 X10(3)/MCL (ref 0–0.9)
EOSINOPHIL NFR BLD AUTO: 2.2 %
ERYTHROCYTE [DISTWIDTH] IN BLOOD BY AUTOMATED COUNT: 14.8 % (ref 11–14.5)
GFR SERPLBLD CREATININE-BSD FMLA CKD-EPI: 53 MLS/MIN/1.73/M2
GLOBULIN SER-MCNC: 3.2 GM/DL (ref 2.4–3.5)
GLUCOSE SERPL-MCNC: 143 MG/DL (ref 82–115)
HCT VFR BLD AUTO: 39.7 % (ref 37–47)
HGB BLD-MCNC: 12.4 GM/DL (ref 12–16)
IMM GRANULOCYTES # BLD AUTO: 0.03 X10(3)/MCL (ref 0–0.04)
IMM GRANULOCYTES NFR BLD AUTO: 0.3 %
LYMPHOCYTES # BLD AUTO: 2.1 X10(3)/MCL (ref 0.6–4.6)
LYMPHOCYTES NFR BLD AUTO: 24.3 %
MCH RBC QN AUTO: 27.4 PG
MCHC RBC AUTO-ENTMCNC: 31.2 MG/DL (ref 33–36)
MCV RBC AUTO: 87.6 FL (ref 80–94)
MONOCYTES # BLD AUTO: 0.6 X10(3)/MCL (ref 0.1–1.3)
MONOCYTES NFR BLD AUTO: 6.9 %
NEUTROPHILS # BLD AUTO: 5.72 X10(3)/MCL (ref 2.1–9.2)
NEUTROPHILS NFR BLD AUTO: 66.2 %
PLATELET # BLD AUTO: 262 X10(3)/MCL (ref 140–371)
PMV BLD AUTO: 10.3 FL (ref 9.4–12.4)
POTASSIUM SERPL-SCNC: 4.4 MMOL/L (ref 3.5–5.1)
PROT SERPL-MCNC: 6.8 GM/DL (ref 5.8–7.6)
RBC # BLD AUTO: 4.53 X10(6)/MCL (ref 4.2–5.4)
SODIUM SERPL-SCNC: 143 MMOL/L (ref 136–145)
WBC # SPEC AUTO: 8.7 X10(3)/MCL (ref 4.5–11.5)

## 2022-12-30 PROCEDURE — 82378 CARCINOEMBRYONIC ANTIGEN: CPT

## 2022-12-30 PROCEDURE — 36415 COLL VENOUS BLD VENIPUNCTURE: CPT

## 2022-12-30 PROCEDURE — 86300 IMMUNOASSAY TUMOR CA 15-3: CPT

## 2022-12-30 PROCEDURE — 80053 COMPREHEN METABOLIC PANEL: CPT

## 2022-12-30 PROCEDURE — 85025 COMPLETE CBC W/AUTO DIFF WBC: CPT

## 2023-01-04 LAB — CANCER AG15-3 SERPL IA-ACNC: 14 U/ML

## 2023-01-04 NOTE — PROGRESS NOTES
Subjective:       Patient ID: Elina Paris is a 72 y.o. female.    Chief Complaint: Follow-up (Patient stated no new problems )        Diagnosis:  1.  pT1c, N0, M0 stage IA triple positive invasive ductal carcinoma of the left breast.    Current Treatment:    Adjuvant endocrine therapy started October 2021- anastrozole      Treatment History:  1.  Weekly taxol and herceptin for 12 weeks followed by a year of maintenance herceptin.  Week 1 given on 5/13/2021, week 12 given on 8/19/2021.  Maintenance Herceptin started on 9/9/2021 and completed 5/19/22  2. Adjuvant radiation therapy to start on 9/2/2021, completed 9/30/2021.    Follow-up  Pertinent negatives include no abdominal pain, arthralgias, chest pain, chills, congestion, coughing, diaphoresis, fatigue, fever, headaches, numbness, rash, sore throat or weakness.      HPI:  Patient who underwent a screening mammogram on 12/9/2020 that showed multiple morphologically oval/circumscribed masses throughout both breasts.  This was read as BI-RADS 0, additional imaging recommended.  Diagnostic mammogram and ultrasound of the left breast on 1/4/2021 was performed and showed a 1.6 cm irregular mass at the 3 o'clock position, a 1.0 cm oval mass with obscured margins at the 3 o'clock position and a 0.6 cm irregular mass at the 2 o'clock position.  On ultrasound, the 3:00 lesion 8 cm from the nipple measured 1.6 x 1.2 x 1.2 cm.  This was irregular with angular/microlobulated margins.  There was also posterior shadowing.  The 3 o'clock position 3 cm from the nipple showed a 1.0 x 1.0 x 0.7 cm oval circumscribed mass with internal septation.  The 2 o'clock position lesion 5 cm from the nipple measured 0.6 x 0.5 x 0.7 cm, was avascular with peripheral hyper echogenicity and central hypoechogenicity and indistinct margins.  BI-RADS 4, biopsy recommended.  Patient underwent biopsy on 1/14/2021, the 3 o'clock position lesion 8 cm from the nipple revealed invasive ductal  carcinoma.  The left breast 3 o'clock position 3 cm from the nipple returned as invasive ductal carcinoma.  The left breast lesion at the 2 o'clock position showed benign breast tissue.  Estrogen receptor was 99.74% positive, progesterone preceptor was 98.57% positive, HER-2 was 2+ by IHC and positive by FISH.  Patient then underwent bilateral breast MRI on 2/25/2021 which revealed biopsy-proven malignancy at the 3 o'clock position in the left breast spanning 5.7 cm without suspicious enhancement in either breast.  Patient then underwent a left sided lumpectomy on 3/8/2021, final pathology revealed a pT1c, N0, M0 stage IA triple positive invasive ductal carcinoma.  The patient subsequently had a bilateral breast reduction.  This was done by Dr. Manuel Mclean.  She was referred to medical oncology.  I saw the patient on 3/31/2021.  Baseline DEXA scan completed in 4/8/2021 showed mild osteopenia in the left and right femoral necks.  Echocardiogram completed on 4/14/2021 showed EF of 55-60%.  Taxol and herceptin started on 5/13/2021, week 12 given on 8/19/2021.  Repeat ECHO on 8/31/2021 showed EF of 55-60%.  Radiation started on 9/2/2021.  Started maintenance Herceptin on 9/9/2021.  Completed radiation on 9/30/2021.  Started adjuvant endocrine therapy in October 2021.  Repeat ECHOs have been done on a regular basis and showed no true decrease in ejection fraction, most recently on 2/25/2022 with ejection fraction of 55%.        Interval History:  Patient presents to clinic for scheduled follow up appointment. She is feeling good and denies any new complaints. She does have some pain in the left knee but states that this knee is s/p replacement and she was recently notified that the hardware used has been recalled.  She continues to have some redness and tenderness to her left breast.  She has seen Dr. Renee for this as well as ANAIS Carcamo with breast surgery. She is being referred for lymphedema treatment to  the right breast.      Past Medical History:   Diagnosis Date    Anxiety     Breast cancer     CAD (coronary artery disease)     COPD (chronic obstructive pulmonary disease)     GERD (gastroesophageal reflux disease)     HLD (hyperlipidemia)     Hypertension     Hypothyroidism       Past Surgical History:   Procedure Laterality Date    BREAST BIOPSY  12/2022    CHOLECYSTECTOMY      COLONOSCOPY  07/06/2016    HYSTERECTOMY      MASTECTOMY, PARTIAL  03/08/2021    MEDIPORT INSERTION, SINGLE  08/2021    REDUCTION OF BOTH BREASTS Bilateral 03/08/2021    TONSILLECTOMY      TOTAL KNEE ARTHROPLASTY Bilateral      Social History     Socioeconomic History    Marital status:    Tobacco Use    Smoking status: Former     Types: Cigarettes    Smokeless tobacco: Never   Substance and Sexual Activity    Alcohol use: Never    Drug use: Never      Family History   Problem Relation Age of Onset    Cancer Mother 68    Coronary artery disease Mother     Hypertension Mother     Hypertension Father     Coronary artery disease Father     Cancer Brother     Hypertension Brother       Review of patient's allergies indicates:  No Known Allergies   Review of Systems   Constitutional:  Negative for appetite change, chills, diaphoresis, fatigue, fever and unexpected weight change.   HENT:  Negative for nasal congestion, mouth sores, sinus pressure/congestion and sore throat.    Eyes:  Negative for pain and visual disturbance.   Respiratory:  Negative for cough, chest tightness and shortness of breath.    Cardiovascular:  Negative for chest pain, palpitations and leg swelling.   Gastrointestinal:  Negative for abdominal distention, abdominal pain, blood in stool, constipation and diarrhea.   Genitourinary:  Negative for dysuria, frequency and hematuria.   Musculoskeletal:  Negative for arthralgias and back pain.   Integumentary:  Negative for rash.   Neurological:  Negative for dizziness, weakness, numbness and headaches.   Hematological:   Negative for adenopathy.   Psychiatric/Behavioral:  Negative for confusion. The patient is not nervous/anxious.        Objective:      Physical Exam  Vitals reviewed.   Constitutional:       General: She is not in acute distress.     Appearance: Normal appearance.   HENT:      Head: Normocephalic and atraumatic.      Nose: Nose normal.   Eyes:      Extraocular Movements: Extraocular movements intact.      Conjunctiva/sclera: Conjunctivae normal.   Cardiovascular:      Rate and Rhythm: Normal rate and regular rhythm.      Heart sounds: Normal heart sounds.   Pulmonary:      Effort: Pulmonary effort is normal.      Breath sounds: Normal breath sounds.   Chest:      Comments: Breast exam deferred as she was recently seen by surgery.   Abdominal:      General: Bowel sounds are normal.      Palpations: Abdomen is soft.   Musculoskeletal:         General: No swelling. Normal range of motion.      Cervical back: Normal range of motion and neck supple.      Right lower leg: No edema.      Left lower leg: No edema.   Lymphadenopathy:      Cervical: No cervical adenopathy.   Skin:     General: Skin is warm and dry.   Neurological:      General: No focal deficit present.      Mental Status: She is alert and oriented to person, place, and time. Mental status is at baseline.   Psychiatric:         Mood and Affect: Mood normal.         Behavior: Behavior normal.     LABS AND IMAGING REVIEWED IN EPIC        Assessment:     1.  pT1c, N0, M0 stage IA triple positive invasive ductal carcinoma of the left breast.        Plan:         Treated with Taxol and Herceptin weekly for 12 weeks, completed on 8/19/2021.  Radiation started on 9/2/2021, finished on 9/30/2021.       Continue endocrine therapy as prescribed, started October 2021. Will complete maintenance Herceptin on 5/19/2022.    Echocardiograms done every 3 months during treatment with Herceptin, most recently on 6/8/2022 with ejection fraction of 58%.    Baseline DEXA scan  completed in 4/8/2021 showed mild osteopenia of the left and right femoral neck. Repeat due in 4/2023    Started maintenance Herceptin on 9/9/2021 and completed 1 year worth in May 2022.      CBC, CMP, CEA, CA 15-3 prior to follow up    Return to clinic in 3 months     Scout Prasad II, MD

## 2023-01-05 ENCOUNTER — OFFICE VISIT (OUTPATIENT)
Dept: HEMATOLOGY/ONCOLOGY | Facility: CLINIC | Age: 73
End: 2023-01-05
Payer: MEDICARE

## 2023-01-05 VITALS
WEIGHT: 206.81 LBS | DIASTOLIC BLOOD PRESSURE: 77 MMHG | OXYGEN SATURATION: 97 % | BODY MASS INDEX: 36.64 KG/M2 | RESPIRATION RATE: 18 BRPM | SYSTOLIC BLOOD PRESSURE: 141 MMHG | TEMPERATURE: 98 F | HEART RATE: 90 BPM | HEIGHT: 63 IN

## 2023-01-05 DIAGNOSIS — C50.912 MALIGNANT NEOPLASM OF LEFT BREAST IN FEMALE, ESTROGEN RECEPTOR POSITIVE, UNSPECIFIED SITE OF BREAST: Primary | ICD-10-CM

## 2023-01-05 DIAGNOSIS — Z17.0 MALIGNANT NEOPLASM OF LEFT BREAST IN FEMALE, ESTROGEN RECEPTOR POSITIVE, UNSPECIFIED SITE OF BREAST: Primary | ICD-10-CM

## 2023-01-05 DIAGNOSIS — Z79.811 LONG TERM (CURRENT) USE OF AROMATASE INHIBITORS: ICD-10-CM

## 2023-01-05 DIAGNOSIS — Z78.0 POST-MENOPAUSE: ICD-10-CM

## 2023-01-05 PROCEDURE — 99999 PR PBB SHADOW E&M-EST. PATIENT-LVL V: CPT | Mod: PBBFAC,,, | Performed by: INTERNAL MEDICINE

## 2023-01-05 PROCEDURE — 3288F FALL RISK ASSESSMENT DOCD: CPT | Mod: CPTII,S$GLB,, | Performed by: INTERNAL MEDICINE

## 2023-01-05 PROCEDURE — 1159F PR MEDICATION LIST DOCUMENTED IN MEDICAL RECORD: ICD-10-PCS | Mod: CPTII,S$GLB,, | Performed by: INTERNAL MEDICINE

## 2023-01-05 PROCEDURE — 3077F SYST BP >= 140 MM HG: CPT | Mod: CPTII,S$GLB,, | Performed by: INTERNAL MEDICINE

## 2023-01-05 PROCEDURE — 1160F RVW MEDS BY RX/DR IN RCRD: CPT | Mod: CPTII,S$GLB,, | Performed by: INTERNAL MEDICINE

## 2023-01-05 PROCEDURE — 3288F PR FALLS RISK ASSESSMENT DOCUMENTED: ICD-10-PCS | Mod: CPTII,S$GLB,, | Performed by: INTERNAL MEDICINE

## 2023-01-05 PROCEDURE — 1159F MED LIST DOCD IN RCRD: CPT | Mod: CPTII,S$GLB,, | Performed by: INTERNAL MEDICINE

## 2023-01-05 PROCEDURE — 1126F PR PAIN SEVERITY QUANTIFIED, NO PAIN PRESENT: ICD-10-PCS | Mod: CPTII,S$GLB,, | Performed by: INTERNAL MEDICINE

## 2023-01-05 PROCEDURE — 1101F PR PT FALLS ASSESS DOC 0-1 FALLS W/OUT INJ PAST YR: ICD-10-PCS | Mod: CPTII,S$GLB,, | Performed by: INTERNAL MEDICINE

## 2023-01-05 PROCEDURE — 99214 PR OFFICE/OUTPT VISIT, EST, LEVL IV, 30-39 MIN: ICD-10-PCS | Mod: S$GLB,,, | Performed by: INTERNAL MEDICINE

## 2023-01-05 PROCEDURE — 99999 PR PBB SHADOW E&M-EST. PATIENT-LVL V: ICD-10-PCS | Mod: PBBFAC,,, | Performed by: INTERNAL MEDICINE

## 2023-01-05 PROCEDURE — 1126F AMNT PAIN NOTED NONE PRSNT: CPT | Mod: CPTII,S$GLB,, | Performed by: INTERNAL MEDICINE

## 2023-01-05 PROCEDURE — 3077F PR MOST RECENT SYSTOLIC BLOOD PRESSURE >= 140 MM HG: ICD-10-PCS | Mod: CPTII,S$GLB,, | Performed by: INTERNAL MEDICINE

## 2023-01-05 PROCEDURE — 3008F PR BODY MASS INDEX (BMI) DOCUMENTED: ICD-10-PCS | Mod: CPTII,S$GLB,, | Performed by: INTERNAL MEDICINE

## 2023-01-05 PROCEDURE — 3078F PR MOST RECENT DIASTOLIC BLOOD PRESSURE < 80 MM HG: ICD-10-PCS | Mod: CPTII,S$GLB,, | Performed by: INTERNAL MEDICINE

## 2023-01-05 PROCEDURE — 1160F PR REVIEW ALL MEDS BY PRESCRIBER/CLIN PHARMACIST DOCUMENTED: ICD-10-PCS | Mod: CPTII,S$GLB,, | Performed by: INTERNAL MEDICINE

## 2023-01-05 PROCEDURE — 3008F BODY MASS INDEX DOCD: CPT | Mod: CPTII,S$GLB,, | Performed by: INTERNAL MEDICINE

## 2023-01-05 PROCEDURE — 3078F DIAST BP <80 MM HG: CPT | Mod: CPTII,S$GLB,, | Performed by: INTERNAL MEDICINE

## 2023-01-05 PROCEDURE — 99214 OFFICE O/P EST MOD 30 MIN: CPT | Mod: S$GLB,,, | Performed by: INTERNAL MEDICINE

## 2023-01-05 PROCEDURE — 1101F PT FALLS ASSESS-DOCD LE1/YR: CPT | Mod: CPTII,S$GLB,, | Performed by: INTERNAL MEDICINE

## 2023-03-31 ENCOUNTER — LAB VISIT (OUTPATIENT)
Dept: LAB | Facility: HOSPITAL | Age: 73
End: 2023-03-31
Payer: MEDICARE

## 2023-03-31 DIAGNOSIS — Z79.811 LONG TERM (CURRENT) USE OF AROMATASE INHIBITORS: ICD-10-CM

## 2023-03-31 DIAGNOSIS — Z17.0 MALIGNANT NEOPLASM OF LEFT BREAST IN FEMALE, ESTROGEN RECEPTOR POSITIVE, UNSPECIFIED SITE OF BREAST: ICD-10-CM

## 2023-03-31 DIAGNOSIS — C50.912 MALIGNANT NEOPLASM OF LEFT BREAST IN FEMALE, ESTROGEN RECEPTOR POSITIVE, UNSPECIFIED SITE OF BREAST: ICD-10-CM

## 2023-03-31 LAB
ALBUMIN SERPL-MCNC: 3.8 G/DL (ref 3.4–4.8)
ALBUMIN/GLOB SERPL: 1.3 RATIO (ref 1.1–2)
ALP SERPL-CCNC: 127 UNIT/L (ref 40–150)
ALT SERPL-CCNC: 15 UNIT/L (ref 0–55)
AST SERPL-CCNC: 17 UNIT/L (ref 5–34)
BASOPHILS # BLD AUTO: 0.01 X10(3)/MCL (ref 0–0.2)
BASOPHILS NFR BLD AUTO: 0.2 %
BILIRUBIN DIRECT+TOT PNL SERPL-MCNC: 0.7 MG/DL
BUN SERPL-MCNC: 22.7 MG/DL (ref 9.8–20.1)
CALCIUM SERPL-MCNC: 9.9 MG/DL (ref 8.4–10.2)
CEA SERPL-MCNC: <1.73 NG/ML (ref 0–3)
CHLORIDE SERPL-SCNC: 107 MMOL/L (ref 98–107)
CO2 SERPL-SCNC: 28 MMOL/L (ref 23–31)
CREAT SERPL-MCNC: 1.14 MG/DL (ref 0.55–1.02)
EOSINOPHIL # BLD AUTO: 0.3 X10(3)/MCL (ref 0–0.9)
EOSINOPHIL NFR BLD AUTO: 4.9 %
ERYTHROCYTE [DISTWIDTH] IN BLOOD BY AUTOMATED COUNT: 13.8 % (ref 11.5–17)
GFR SERPLBLD CREATININE-BSD FMLA CKD-EPI: 51 MLS/MIN/1.73/M2
GLOBULIN SER-MCNC: 2.9 GM/DL (ref 2.4–3.5)
GLUCOSE SERPL-MCNC: 146 MG/DL (ref 82–115)
HCT VFR BLD AUTO: 41.4 % (ref 37–47)
HGB BLD-MCNC: 12.5 G/DL (ref 12–16)
IMM GRANULOCYTES # BLD AUTO: 0.01 X10(3)/MCL (ref 0–0.04)
IMM GRANULOCYTES NFR BLD AUTO: 0.2 %
LYMPHOCYTES # BLD AUTO: 1.89 X10(3)/MCL (ref 0.6–4.6)
LYMPHOCYTES NFR BLD AUTO: 31 %
MCH RBC QN AUTO: 26.7 PG (ref 27–31)
MCHC RBC AUTO-ENTMCNC: 30.2 G/DL (ref 33–36)
MCV RBC AUTO: 88.5 FL (ref 80–94)
MONOCYTES # BLD AUTO: 0.51 X10(3)/MCL (ref 0.1–1.3)
MONOCYTES NFR BLD AUTO: 8.4 %
NEUTROPHILS # BLD AUTO: 3.37 X10(3)/MCL (ref 2.1–9.2)
NEUTROPHILS NFR BLD AUTO: 55.3 %
PLATELET # BLD AUTO: 242 X10(3)/MCL (ref 130–400)
PMV BLD AUTO: 10 FL (ref 7.4–10.4)
POTASSIUM SERPL-SCNC: 4 MMOL/L (ref 3.5–5.1)
PROT SERPL-MCNC: 6.7 GM/DL (ref 5.8–7.6)
RBC # BLD AUTO: 4.68 X10(6)/MCL (ref 4.2–5.4)
SODIUM SERPL-SCNC: 143 MMOL/L (ref 136–145)
WBC # SPEC AUTO: 6.1 X10(3)/MCL (ref 4.5–11.5)

## 2023-03-31 PROCEDURE — 80053 COMPREHEN METABOLIC PANEL: CPT

## 2023-03-31 PROCEDURE — 85025 COMPLETE CBC W/AUTO DIFF WBC: CPT

## 2023-03-31 PROCEDURE — 36415 COLL VENOUS BLD VENIPUNCTURE: CPT

## 2023-03-31 PROCEDURE — 82378 CARCINOEMBRYONIC ANTIGEN: CPT

## 2023-03-31 PROCEDURE — 86300 IMMUNOASSAY TUMOR CA 15-3: CPT | Mod: 90

## 2023-04-01 LAB — CANCER AG15-3 SERPL IA-ACNC: 14 U/ML

## 2023-04-03 ENCOUNTER — HOSPITAL ENCOUNTER (OUTPATIENT)
Dept: RADIOLOGY | Facility: HOSPITAL | Age: 73
Discharge: HOME | End: 2023-04-03
Attending: INTERNAL MEDICINE
Payer: MEDICARE

## 2023-04-03 DIAGNOSIS — C50.912 MALIGNANT NEOPLASM OF LEFT BREAST IN FEMALE, ESTROGEN RECEPTOR POSITIVE, UNSPECIFIED SITE OF BREAST: ICD-10-CM

## 2023-04-03 DIAGNOSIS — Z79.811 LONG TERM (CURRENT) USE OF AROMATASE INHIBITORS: ICD-10-CM

## 2023-04-03 DIAGNOSIS — Z78.0 POST-MENOPAUSE: ICD-10-CM

## 2023-04-03 DIAGNOSIS — Z17.0 MALIGNANT NEOPLASM OF LEFT BREAST IN FEMALE, ESTROGEN RECEPTOR POSITIVE, UNSPECIFIED SITE OF BREAST: ICD-10-CM

## 2023-04-03 PROCEDURE — 77080 DXA BONE DENSITY AXIAL: CPT | Mod: TC

## 2023-04-03 PROCEDURE — 77080 DXA BONE DENSITY AXIAL: CPT | Mod: 26,,, | Performed by: RADIOLOGY

## 2023-04-03 PROCEDURE — 77080 DXA BONE DENSITY AXIAL SKELETON 1 OR MORE SITES: ICD-10-PCS | Mod: 26,,, | Performed by: RADIOLOGY

## 2023-04-05 ENCOUNTER — OFFICE VISIT (OUTPATIENT)
Dept: HEMATOLOGY/ONCOLOGY | Facility: CLINIC | Age: 73
End: 2023-04-05
Payer: MEDICARE

## 2023-04-05 VITALS
HEIGHT: 62 IN | WEIGHT: 204.63 LBS | DIASTOLIC BLOOD PRESSURE: 68 MMHG | OXYGEN SATURATION: 96 % | RESPIRATION RATE: 18 BRPM | SYSTOLIC BLOOD PRESSURE: 102 MMHG | TEMPERATURE: 99 F | BODY MASS INDEX: 37.66 KG/M2 | HEART RATE: 89 BPM

## 2023-04-05 DIAGNOSIS — C50.919 MALIGNANT NEOPLASM OF FEMALE BREAST, UNSPECIFIED ESTROGEN RECEPTOR STATUS, UNSPECIFIED LATERALITY, UNSPECIFIED SITE OF BREAST: Primary | ICD-10-CM

## 2023-04-05 DIAGNOSIS — Z79.811 LONG TERM (CURRENT) USE OF AROMATASE INHIBITORS: ICD-10-CM

## 2023-04-05 DIAGNOSIS — M85.80 OSTEOPENIA, UNSPECIFIED LOCATION: ICD-10-CM

## 2023-04-05 PROCEDURE — 1159F MED LIST DOCD IN RCRD: CPT | Mod: CPTII,S$GLB,, | Performed by: NURSE PRACTITIONER

## 2023-04-05 PROCEDURE — 3008F BODY MASS INDEX DOCD: CPT | Mod: CPTII,S$GLB,, | Performed by: NURSE PRACTITIONER

## 2023-04-05 PROCEDURE — 1160F PR REVIEW ALL MEDS BY PRESCRIBER/CLIN PHARMACIST DOCUMENTED: ICD-10-PCS | Mod: CPTII,S$GLB,, | Performed by: NURSE PRACTITIONER

## 2023-04-05 PROCEDURE — 99999 PR PBB SHADOW E&M-EST. PATIENT-LVL IV: CPT | Mod: PBBFAC,,, | Performed by: NURSE PRACTITIONER

## 2023-04-05 PROCEDURE — 1160F RVW MEDS BY RX/DR IN RCRD: CPT | Mod: CPTII,S$GLB,, | Performed by: NURSE PRACTITIONER

## 2023-04-05 PROCEDURE — 3074F SYST BP LT 130 MM HG: CPT | Mod: CPTII,S$GLB,, | Performed by: NURSE PRACTITIONER

## 2023-04-05 PROCEDURE — 3288F FALL RISK ASSESSMENT DOCD: CPT | Mod: CPTII,S$GLB,, | Performed by: NURSE PRACTITIONER

## 2023-04-05 PROCEDURE — 1159F PR MEDICATION LIST DOCUMENTED IN MEDICAL RECORD: ICD-10-PCS | Mod: CPTII,S$GLB,, | Performed by: NURSE PRACTITIONER

## 2023-04-05 PROCEDURE — 1101F PR PT FALLS ASSESS DOC 0-1 FALLS W/OUT INJ PAST YR: ICD-10-PCS | Mod: CPTII,S$GLB,, | Performed by: NURSE PRACTITIONER

## 2023-04-05 PROCEDURE — 3074F PR MOST RECENT SYSTOLIC BLOOD PRESSURE < 130 MM HG: ICD-10-PCS | Mod: CPTII,S$GLB,, | Performed by: NURSE PRACTITIONER

## 2023-04-05 PROCEDURE — 99214 OFFICE O/P EST MOD 30 MIN: CPT | Mod: S$GLB,,, | Performed by: NURSE PRACTITIONER

## 2023-04-05 PROCEDURE — 99999 PR PBB SHADOW E&M-EST. PATIENT-LVL IV: ICD-10-PCS | Mod: PBBFAC,,, | Performed by: NURSE PRACTITIONER

## 2023-04-05 PROCEDURE — 3288F PR FALLS RISK ASSESSMENT DOCUMENTED: ICD-10-PCS | Mod: CPTII,S$GLB,, | Performed by: NURSE PRACTITIONER

## 2023-04-05 PROCEDURE — 3078F PR MOST RECENT DIASTOLIC BLOOD PRESSURE < 80 MM HG: ICD-10-PCS | Mod: CPTII,S$GLB,, | Performed by: NURSE PRACTITIONER

## 2023-04-05 PROCEDURE — 3008F PR BODY MASS INDEX (BMI) DOCUMENTED: ICD-10-PCS | Mod: CPTII,S$GLB,, | Performed by: NURSE PRACTITIONER

## 2023-04-05 PROCEDURE — 1101F PT FALLS ASSESS-DOCD LE1/YR: CPT | Mod: CPTII,S$GLB,, | Performed by: NURSE PRACTITIONER

## 2023-04-05 PROCEDURE — 99214 PR OFFICE/OUTPT VISIT, EST, LEVL IV, 30-39 MIN: ICD-10-PCS | Mod: S$GLB,,, | Performed by: NURSE PRACTITIONER

## 2023-04-05 PROCEDURE — 3078F DIAST BP <80 MM HG: CPT | Mod: CPTII,S$GLB,, | Performed by: NURSE PRACTITIONER

## 2023-04-05 RX ORDER — ONDANSETRON 4 MG/1
1 TABLET, FILM COATED ORAL
COMMUNITY
Start: 2023-03-27

## 2023-04-05 RX ORDER — HYDROCODONE BITARTRATE AND ACETAMINOPHEN 10; 325 MG/1; MG/1
1 TABLET ORAL
COMMUNITY
Start: 2023-03-07

## 2023-04-05 NOTE — PROGRESS NOTES
Subjective:       Patient ID: Elina Paris is a 73 y.o. female.    Chief Complaint: Breast Cancer (Pt reports no issues or concerns)        Diagnosis:  1.  pT1c, N0, M0 stage IA triple positive invasive ductal carcinoma of the left breast.    Current Treatment:    Adjuvant endocrine therapy started October 2021- anastrozole      Treatment History:  1.  Weekly taxol and herceptin for 12 weeks followed by a year of maintenance herceptin.  Week 1 given on 5/13/2021, week 12 given on 8/19/2021.  Maintenance Herceptin started on 9/9/2021 and completed 5/19/22  2. Adjuvant radiation therapy to start on 9/2/2021, completed 9/30/2021.    Follow-up  Pertinent negatives include no abdominal pain, arthralgias, chest pain, chills, congestion, coughing, diaphoresis, fatigue, fever, headaches, numbness, rash, sore throat or weakness.   Breast Cancer  Pertinent negatives include no abdominal pain, arthralgias, chest pain, chills, congestion, coughing, diaphoresis, fatigue, fever, headaches, numbness, rash, sore throat or weakness.      HPI:  Patient who underwent a screening mammogram on 12/9/2020 that showed multiple morphologically oval/circumscribed masses throughout both breasts.  This was read as BI-RADS 0, additional imaging recommended.  Diagnostic mammogram and ultrasound of the left breast on 1/4/2021 was performed and showed a 1.6 cm irregular mass at the 3 o'clock position, a 1.0 cm oval mass with obscured margins at the 3 o'clock position and a 0.6 cm irregular mass at the 2 o'clock position.  On ultrasound, the 3:00 lesion 8 cm from the nipple measured 1.6 x 1.2 x 1.2 cm.  This was irregular with angular/microlobulated margins.  There was also posterior shadowing.  The 3 o'clock position 3 cm from the nipple showed a 1.0 x 1.0 x 0.7 cm oval circumscribed mass with internal septation.  The 2 o'clock position lesion 5 cm from the nipple measured 0.6 x 0.5 x 0.7 cm, was avascular with peripheral hyper  echogenicity and central hypoechogenicity and indistinct margins.  BI-RADS 4, biopsy recommended.  Patient underwent biopsy on 1/14/2021, the 3 o'clock position lesion 8 cm from the nipple revealed invasive ductal carcinoma.  The left breast 3 o'clock position 3 cm from the nipple returned as invasive ductal carcinoma.  The left breast lesion at the 2 o'clock position showed benign breast tissue.  Estrogen receptor was 99.74% positive, progesterone preceptor was 98.57% positive, HER-2 was 2+ by IHC and positive by FISH.  Patient then underwent bilateral breast MRI on 2/25/2021 which revealed biopsy-proven malignancy at the 3 o'clock position in the left breast spanning 5.7 cm without suspicious enhancement in either breast.  Patient then underwent a left sided lumpectomy on 3/8/2021, final pathology revealed a pT1c, N0, M0 stage IA triple positive invasive ductal carcinoma.  The patient subsequently had a bilateral breast reduction.  This was done by Dr. Manuel Mclean.  She was referred to medical oncology.  I saw the patient on 3/31/2021.  Baseline DEXA scan completed in 4/8/2021 showed mild osteopenia in the left and right femoral necks.  Echocardiogram completed on 4/14/2021 showed EF of 55-60%.  Taxol and herceptin started on 5/13/2021, week 12 given on 8/19/2021.  Repeat ECHO on 8/31/2021 showed EF of 55-60%.  Radiation started on 9/2/2021.  Started maintenance Herceptin on 9/9/2021.  Completed radiation on 9/30/2021.  Started adjuvant endocrine therapy in October 2021.  Repeat ECHOs have been done on a regular basis and showed no true decrease in ejection fraction, most recently on 2/25/2022 with ejection fraction of 55%.        Interval History:  Patient presents to clinic for scheduled follow up appointment for breast cancer. She is feeling good and denies any new complaints.  She remains on anastrozole and continues to tolerate it well.  Since her last visit she did have her left knee replaced in his  recovering well with no complications.  She is no longer having tenderness in her left breast, she does still have redness.  She is interested in having her MediPort removed.  She denies any new complaints.      Past Medical History:   Diagnosis Date    Anxiety     Breast cancer     CAD (coronary artery disease)     COPD (chronic obstructive pulmonary disease)     GERD (gastroesophageal reflux disease)     HLD (hyperlipidemia)     Hypertension     Hypothyroidism       Past Surgical History:   Procedure Laterality Date    BREAST BIOPSY  12/2022    CHOLECYSTECTOMY      COLONOSCOPY  07/06/2016    HYSTERECTOMY      MASTECTOMY, PARTIAL  03/08/2021    MEDIPORT INSERTION, SINGLE  08/2021    REDUCTION OF BOTH BREASTS Bilateral 03/08/2021    TONSILLECTOMY      TOTAL KNEE ARTHROPLASTY Bilateral      Social History     Socioeconomic History    Marital status:    Tobacco Use    Smoking status: Former     Types: Cigarettes    Smokeless tobacco: Never   Substance and Sexual Activity    Alcohol use: Never    Drug use: Never      Family History   Problem Relation Age of Onset    Cancer Mother 68    Coronary artery disease Mother     Hypertension Mother     Hypertension Father     Coronary artery disease Father     Cancer Brother     Hypertension Brother       Review of patient's allergies indicates:  No Known Allergies   Review of Systems   Constitutional:  Negative for appetite change, chills, diaphoresis, fatigue, fever and unexpected weight change.   HENT:  Negative for nasal congestion, mouth sores, sinus pressure/congestion and sore throat.    Eyes:  Negative for pain and visual disturbance.   Respiratory:  Negative for cough, chest tightness and shortness of breath.    Cardiovascular:  Negative for chest pain, palpitations and leg swelling.   Gastrointestinal:  Negative for abdominal distention, abdominal pain, blood in stool, constipation and diarrhea.   Genitourinary:  Negative for dysuria, frequency and hematuria.    Musculoskeletal:  Negative for arthralgias and back pain.   Integumentary:  Negative for rash.   Neurological:  Negative for dizziness, weakness, numbness and headaches.   Hematological:  Negative for adenopathy.   Psychiatric/Behavioral:  Negative for confusion. The patient is not nervous/anxious.        Objective:      Physical Exam  Vitals reviewed.   Constitutional:       General: She is not in acute distress.     Appearance: Normal appearance.   HENT:      Head: Normocephalic and atraumatic.      Nose: Nose normal.   Eyes:      Extraocular Movements: Extraocular movements intact.      Conjunctiva/sclera: Conjunctivae normal.   Cardiovascular:      Rate and Rhythm: Normal rate and regular rhythm.      Heart sounds: Normal heart sounds.   Pulmonary:      Effort: Pulmonary effort is normal.      Breath sounds: Normal breath sounds.   Chest:      Comments: Left breast with radiation skin changes and scarring, no suspicious masses.  Right breast status post reduction with no suspicious masses or skin changes.  Abdominal:      General: Bowel sounds are normal.      Palpations: Abdomen is soft.   Musculoskeletal:         General: No swelling. Normal range of motion.      Cervical back: Normal range of motion and neck supple.      Right lower leg: No edema.      Left lower leg: No edema.   Lymphadenopathy:      Cervical: No cervical adenopathy.      Upper Body:      Right upper body: No supraclavicular or axillary adenopathy.      Left upper body: No supraclavicular or axillary adenopathy.   Skin:     General: Skin is warm and dry.   Neurological:      General: No focal deficit present.      Mental Status: She is alert and oriented to person, place, and time. Mental status is at baseline.   Psychiatric:         Mood and Affect: Mood normal.         Behavior: Behavior normal.     LABS AND IMAGING REVIEWED IN EPIC        Assessment:     1.  pT1c, N0, M0 stage IA triple positive invasive ductal carcinoma of the left  breast.        Plan:         Treated with Taxol and Herceptin weekly for 12 weeks, completed on 8/19/2021.  Radiation started on 9/2/2021, finished on 9/30/2021.       Continue endocrine therapy as prescribed, started October 2021. Will complete maintenance Herceptin on 5/19/2022.    Echocardiograms done every 3 months during treatment with Herceptin, most recently on 6/8/2022 with ejection fraction of 58%.    Baseline DEXA scan completed in 4/8/2021 showed mild osteopenia of the left and right femoral neck. Repeat done 4/3/2023 with continued osteopenia.  We will continue to monitor.  She will continue calcium for bone health.    Started maintenance Herceptin on 9/9/2021 and completed 1 year worth in May 2022.      CBC, CMP, CEA, CA 15-3 prior to follow up    Return to clinic in 3 months     EDI Elaine

## 2023-06-20 NOTE — PROGRESS NOTES
[facility] Breast Surg  Breast Surgical Oncology  Follow-Up Patient Office Visit       Referring Provider: No ref. provider found   PCP: Katiuska Foster MD   Care Team:   Medical Oncologist: Dr. Scout Prasad  Radiation Oncologist: Dr. Lopez Prellop    Chief Complaint:   Chief Complaint   Patient presents with    Follow-up     Patient present for a 6mth follow up visit post BC dx. Patient c/o sensitivity on left breast but no pain.        Subjective:   Treatment History:  1. Left lumpectomy with left SLNB with bilateral breast reduction on 3/8/2021.  2. Taxol and Herceptin x's 12 weeks, completed 8/19/2021.  3. Maintenance Herceptin x's 1 year started 9/9/2021 - 5/19/2022  4. Adjuvant Radiation 9/2/2021 - 9/30/2021  5. 12/7/2022 Punch biopsy to persistent redness/skin thickening of left breast since radiation - benign findings consistent with thickened fibrotic dermis    Interval History:  6/21/2023 - Elina Paris returns today for follow up and clinical breast exam. She is doing well and has no breast concerns. She reports no changes in the redness to her left breast which as been present since the time of her radiation. She also still has palpable hardness in various areas of the left breast related to scarring from her breast reduction. She has a lot of sensitivity at her mediport and would like it removed.    HPI:  Elina Paris is a pleasant 70 Years old Female who presents with AJCC 8th ed clinical anatomic stage IA / prognostic stage IA (cT1c cN0 M0) multifocal Left invasive ductal carcinoma grade 1 at 3 o'clock 8 cm FN ER 99.74%, WA 98.57%, HER2 2 - equivocal on IHC/FISH positive and left invasive ductal carcinoma, grade 2 at 3 o'clock 3 cm FN ER 98.93%, WA 0.0%, HER2 2- equivocal on IHC/FISH positive. She is SP Left Lumpectomy with SLNB on 3/8/2021. Final pathology revealed left breast invasive ductal carcinoma , grade 1 with one foci measuring 1.8 cm and  another foci measuring 2.0 cm with associated foci of DCIS. All margins were clear. Two sentinel lymph nodes negative for metastatic carcinoma. The patient subsequently had a bilateral breast reduction. Due to HER-2 being positive, we treated with Taxol and Herceptin weekly for 12 weeks, completed on 2021. She will then complete a years worth of Herceptin, started on 2021. Radiation started on 2021, finished on 2021.    She returned 10/2022 after being seen by radiation oncology with concerns regarding redness, pain, and intermittent swelling to the left breast, which she states has been present since the time of her surgery and when she finished radiation. The swelling and pain comes and goes. She denies swelling in the left arm. She had a L DG MG to evaluate breast pain concerns in about 1 month ago which were benign. There was no detrimental interval change in the mammographic appearance of the left breast and no sonographic evidence of a mass, fluid collection, or hyperemia to suggest inflammation. She was referred to PT/OT for treatment of left breast lymphedema. With 4 weeks of therapy, she achieved resolution of pain symptoms and reports that prior skin thickening, swelling, and hardness significantly improved. However, redness it is still present and she was recommended for biopsy (benign findings consistent with thickened fibrotic dermis). This would be related to changes from radiation and lymphedema of the breast.    Imagin. 2020 [facility] MG at [facility] - which revealed multiple morphologically oval/circumscribed masses throughout both breasts which have changed in size or resolved after review of prior images, in the left breast UOQ mid depth and UOQ anterior depth two focal asymmetries are seen, and a left breast asymmetry anterior and close to the skin. No other significant masses, calcifications, or other findings in either breast. BI-RADS 0: additional imaging is  recommended.    2. 1/4/21 LEFT DG MG / US LEFT BREAST LIMITED at [facility] - which revealed on L MG at 3 o'clock mid depth a 1.6 cm irregular mass, at 3 o'clock anterior depth 1.0 cm oval mass with obscured margins, and at 2 o'clock anterior depth a 0.6 cm irregular mass. On L US at 3 o'clock 8 cm FN a 1.6 cm x 1.2 cm x 1.2 cm irregular mass with angular.microlobulated margins/posterior shadowing, 3 o'clock 3 cm FN a 1.0 cm x 1.0 cm x 0.7 cm oval circumscribed mass with internal septation (consisted with complicated cyst), and 2 o'clock 5 cm FN a 0.6 cm x 0.5 cm x 0.7 cm avascular with peripheral hyperechogenicity and central hypoechogenicity and indistinct margins.    BI-RADS 4: suspicious and biopsy was recommended for 3 o'clock 8 cm FN mass and 2 o'clock 5 cm FN. US-guided aspiration of 3 o'clock 3 cm FN cyst    3. 2/25/2021 BL Breast MRI at [facility] - which revealed biopsy proven malignancy at 3 o'clock in the left breast spanning 5.7 cm without suspicious enhancement in either breast. BI-RADS 6: biopsy proven malignancy.    3. 11/8/2021 BL DG MG - BENIGN: no mammographic evidence of malignancy. Benign post surgical changes are present bilaterally.     4. 8/17/2022 L DG MG and L Breast US to evaluate left breast pain and for post lumpectomy protocol - BIRADS 2: BENIGN: Mammogram: On today's mammographic views, there are expected, benign post-therapy changes of the left breast related to prior lumpectomy, reduction mammoplasty, and radiation therapy.  No detrimental interval change in the mammographic appearance of the left breast.  No mammographic correlate for the left breast upper outer quadrant/axillary tail area of concern (pain). Ultrasound: Targeted in area of pain (11:00 axis 8 cm FN), revealing no suspicious finding.  At the patient's area of concern, there is normal fatty tissue and expected postsurgical changes.  No sonographic evidence of a mass, fluid collection, or hyperemia to suggest inflammation.  No  suspicious left axillary adenopathy.     5. 11/9/2022 BL DG MG - BIRADS 2: BENIGN: No suspicious masses, calcifications, or other signs of malignancy are identified.  Benign postlumpectomy and post reduction changes are noted in the left breast.  Benign post reduction changes are also noted in the right breast.  A subcutaneous infusion port overlies the right axilla.  No significant interval change compared to the prior examination.      Pathology:  1. Ultrasound-guided Core Biopsy 3 o'clock 8 cm FN - Invasive ductal carcinoma, grade 1  ER 99.74%  NM 98.57%  HER2 SOFIA 2 + equivocal on IHC/FISH positive    2. Ultrasound-guided Core Biopsy 3 o'clock 3 cm FN - Invasive ductal carcinoma, grade 2  ER 98.93%  NM 0.0%  HER2 SOFIA 2 + equivocal on IHC/FISH positive    3. Ultrasound-guided Core Biopsy 2 o'clock 5 cm FN - Fragments of benign breast tissue and adipose tissue  4. 3/8/2021 Left Lumpectomy with Left [place] Lymph Node Biopsy revealed left breast invasive ductal carcinoma , grade 1 with one foci measuring 1.8 cm and another foci measuring 2.0 cm with associated foci of DCIS. All margins were clear. Two sentinel lymph nodes negative for metastatic carcinoma. The skin appears to show thickened fibrotic dermis which extends approximately 5-6 mm from the epidermal surface. The deeper fibrous tissue envelopes deep eccrine glands.    3. Skin, left breast (punch biopsy) - Skin with prominent dermal fibrous tissue. No evidence of malignancy. No glandular breast tissue identified. Only sparse perivascular lymphocytes are identified. Clinicopathologic correlation recommended.    OB/GYN History:  Menarche Onset: 11  Menopause: Post  Hormonal birth control (duration): no  Pregnancies: none  Age at first pregnancy: n/a  Child births: 0  Breastfeeding duration: no   Hysterectomy: yes  Oophorectomy: ?  HRT: no    Other:  MG breast density: Category B (Scattered fibroglandular)  Prior thoracic RT: none prior to breast cancer  dx  Genetic testing: none  Ashkenazi Gnosticism descent: No      Family History:  Family History   Problem Relation Age of Onset    Cancer Mother 68    Coronary artery disease Mother     Hypertension Mother     Hypertension Father     Coronary artery disease Father     Cancer Brother     Hypertension Brother         Patient History:  Past Medical History:   Diagnosis Date    Anxiety     Breast cancer     CAD (coronary artery disease)     COPD (chronic obstructive pulmonary disease)     GERD (gastroesophageal reflux disease)     HLD (hyperlipidemia)     Hypertension     Hypothyroidism        Past Surgical History:   Procedure Laterality Date    BREAST BIOPSY  12/2022    CHOLECYSTECTOMY      COLONOSCOPY  07/06/2016    HYSTERECTOMY      MASTECTOMY, PARTIAL  03/08/2021    MEDIPORT INSERTION, SINGLE  08/2021    REDUCTION OF BOTH BREASTS Bilateral 03/08/2021    TONSILLECTOMY      TOTAL KNEE ARTHROPLASTY Bilateral        Social History     Socioeconomic History    Marital status:    Tobacco Use    Smoking status: Former     Types: Cigarettes    Smokeless tobacco: Never   Substance and Sexual Activity    Alcohol use: Never    Drug use: Never       Immunization History   Administered Date(s) Administered    COVID-19, MRNA, LN-S, PF (Pfizer) (Purple Cap) 01/13/2021, 02/03/2021       Medications/Allergies:  Current Outpatient Medications on File Prior to Visit   Medication Sig Dispense Refill    acetaminophen (TYLENOL) 500 MG tablet Take 500 mg by mouth every 6 (six) hours as needed.      albuterol (PROVENTIL/VENTOLIN HFA) 90 mcg/actuation inhaler INHALE 2 PUFFS INTO THE LUNGS EVERY 6 HOURS AS NEEDED FOR WHEEZE      anastrozole (ARIMIDEX) 1 mg Tab TAKE 1 TABLET BY MOUTH EVERY DAY 90 tablet 1    aspirin (ECOTRIN) 81 MG EC tablet Take 81 mg by mouth.      atorvastatin (LIPITOR) 40 MG tablet Take 40 mg by mouth once daily.      diltiaZEM HCl (TIAZAC) 300 mg 24 hr capsule Take 1 capsule by mouth once daily.      esomeprazole  (NEXIUM) 40 MG capsule Take 1 capsule by mouth every morning.      HYDROcodone-acetaminophen (NORCO)  mg per tablet Take 1 tablet by mouth as needed (As needed).      levothyroxine (SYNTHROID) 50 MCG tablet Take 1 tablet by mouth once daily.      linaCLOtide (LINZESS) 290 mcg Cap capsule Take 290 mcg by mouth.      nitroGLYCERIN (NITROSTAT) 0.4 MG SL tablet Place 0.4 mg under the tongue.      omeprazole (PRILOSEC) 40 MG capsule Take 40 mg by mouth.      traMADoL (ULTRAM) 50 mg tablet TAKE 1 TABLET BY MOUTH EVERY 12 HOURS AS NEEDED FOR PAIN      traZODone (DESYREL) 50 MG tablet Take 50 mg by mouth every evening.      TRELEGY ELLIPTA 100-62.5-25 mcg DsDv TAKE 1 PUFF BY MOUTH EVERY DAY      triamterene-hydrochlorothiazide 37.5-25 mg (MAXZIDE-25) 37.5-25 mg per tablet Take 1 tablet by mouth once daily.      amoxicillin (AMOXIL) 500 MG capsule Take 1,000 mg by mouth 2 (two) times daily.      benzonatate (TESSALON) 100 MG capsule TAKE 1 CAPSULE (100 MG) BY ORAL ROUTE 3 TIMES PER DAY AS NEEDED FOR COUGH      cefdinir (OMNICEF) 300 MG capsule Take 300 mg by mouth 2 (two) times daily.      ondansetron (ZOFRAN) 4 MG tablet Take 1 tablet by mouth as needed.      ondansetron (ZOFRAN-ODT) 4 MG TbDL Take 4 mg by mouth every 8 (eight) hours as needed.       No current facility-administered medications on file prior to visit.       Review of patient's allergies indicates:  No Known Allergies    Review of Systems:  Pertinent items are noted in HPI.     Objective:     Vitals:  Vitals:    06/21/23 0848   BP: 137/80   Pulse: 94   Resp: 18   Temp: 98 °F (36.7 °C)           Body mass index is 37.68 kg/m².     Physical Exam:  General: The patient is awake, alert and oriented times three. The patient is well nourished and in no acute distress.  Neck: There is no evidence of palpable cervical, supraclavicular or axillary adenopathy. The neck is supple. The thyroid is not enlarged.  Musculoskeletal: The patient has a normal range of  motion of her bilateral upper extremities.  Chest: Examination of the chest wall fails to reveal any obvious abnormalities. Nonlabored breathing, symmetric expansion.  Breast:  Right: There are well healed incisions from her breast reduction. There is mild diffuse tenderness to the breast. Examination of right breast fails to reveal any dominant masses or areas of significant focal nodularity. The nipple is everted without evidence of discharge. There is no skin dimpling with movement of the pectoralis. There are no significant skin changes overlying the breast.   Left: There are well healed incisions from her breast reduction. There is diffuse hyperpigmentation as well as residual redness to the areola and surrounding skin (see image). This was previously reported on physical exam 5/2022 and reportedly present since radiation. Redness is well demarcated. Otherwise, examination of the left breast fails to reveal any dominant masses or areas of significant focal nodularity. The nipple is everted without evidence of discharge. There is no skin dimpling with movement of the pectoralis. There are no significant skin changes overlying the breast.  Abdomen: The abdomen is soft, flat, nontender and nondistended.  Integumentary: no rashes or skin lesions present  Neurologic: cranial nerves intact, no signs of peripheral neurological deficit, motor/sensory function intact      Image from 10/5/2022. Relatively no change seen on today's exam (6/21/2023).      Assessment and Plan:       Elina was seen today for follow-up.    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 Pathology results discussed.        Plan:     1. Continue OT/PT for lymphedema management as  needed.    2. Punch biopsy of area of concern was benign and consistent with radiation and lymphedema changes. Will continue to monitor. She was advised to call if symptoms worsen or if she notices any other changes/symptoms to the breasts.    3. BL DG MG in 1 year (November 2023). This is already scheduled. RTC in 6 months for CBE.    4. Healthy lifestyle guidelines were reviewed. She was encouraged to engage in regular exercise, maintain a healthy body weight, and avoid excessive alcohol consumption. Healthy nutritional guidelines were also discussed. Self-breast examination was reviewed with the patient in detail and she was encouraged to perform this on a monthly basis.    5. Patient is on list for mediport removal.       All of her questions were answered.     Nadeen Philip PA-C        Total time on the date of the visit ranged from 30-39 mins (25716). Total time includes both face-to-face and non-face-to-face time personally spent by myself on the day of the visit.    Non-face-to-face time included:  _X_ preparing to see the patient such as reviewing the patient record  __ obtaining and reviewing separately obtained history  _X_ independently interpreting results  _X_ documenting clinical information in electronic health record.    Face-to-face time included:  _X_ performing an appropriate history and examination  _X_ communicating results to the patient  _X_ counseling and educating the patient  __ ordering appropriate medications  _x_ ordering appropriate tests  _X_ ordering appropriate procedures (including follow-up)  _X_ answering any questions the patient had    Total Time spent on date of visit: 35 minutes

## 2023-06-21 ENCOUNTER — OFFICE VISIT (OUTPATIENT)
Dept: SURGERY | Facility: CLINIC | Age: 73
End: 2023-06-21
Payer: MEDICARE

## 2023-06-21 VITALS
HEART RATE: 94 BPM | BODY MASS INDEX: 37.91 KG/M2 | SYSTOLIC BLOOD PRESSURE: 137 MMHG | HEIGHT: 62 IN | WEIGHT: 206 LBS | DIASTOLIC BLOOD PRESSURE: 80 MMHG | RESPIRATION RATE: 18 BRPM | OXYGEN SATURATION: 97 % | TEMPERATURE: 98 F

## 2023-06-21 DIAGNOSIS — C50.812 MALIGNANT NEOPLASM OF OVERLAPPING SITES OF LEFT BREAST IN FEMALE, ESTROGEN RECEPTOR POSITIVE: Primary | ICD-10-CM

## 2023-06-21 DIAGNOSIS — Z17.0 MALIGNANT NEOPLASM OF OVERLAPPING SITES OF LEFT BREAST IN FEMALE, ESTROGEN RECEPTOR POSITIVE: Primary | ICD-10-CM

## 2023-06-21 DIAGNOSIS — L59.8 RADIATION SKIN FIBROSIS FROM THERAPEUTIC PROCEDURE: ICD-10-CM

## 2023-06-21 DIAGNOSIS — Z90.12 HISTORY OF PARTIAL MASTECTOMY OF LEFT BREAST: ICD-10-CM

## 2023-06-21 DIAGNOSIS — Z85.3 PERSONAL HISTORY OF BREAST CANCER: ICD-10-CM

## 2023-06-21 PROCEDURE — 99214 PR OFFICE/OUTPT VISIT, EST, LEVL IV, 30-39 MIN: ICD-10-PCS | Mod: S$GLB,,, | Performed by: PHYSICIAN ASSISTANT

## 2023-06-21 PROCEDURE — 3008F PR BODY MASS INDEX (BMI) DOCUMENTED: ICD-10-PCS | Mod: CPTII,S$GLB,, | Performed by: PHYSICIAN ASSISTANT

## 2023-06-21 PROCEDURE — 1159F PR MEDICATION LIST DOCUMENTED IN MEDICAL RECORD: ICD-10-PCS | Mod: CPTII,S$GLB,, | Performed by: PHYSICIAN ASSISTANT

## 2023-06-21 PROCEDURE — 3008F BODY MASS INDEX DOCD: CPT | Mod: CPTII,S$GLB,, | Performed by: PHYSICIAN ASSISTANT

## 2023-06-21 PROCEDURE — 1160F RVW MEDS BY RX/DR IN RCRD: CPT | Mod: CPTII,S$GLB,, | Performed by: PHYSICIAN ASSISTANT

## 2023-06-21 PROCEDURE — 99214 OFFICE O/P EST MOD 30 MIN: CPT | Mod: S$GLB,,, | Performed by: PHYSICIAN ASSISTANT

## 2023-06-21 PROCEDURE — 1126F PR PAIN SEVERITY QUANTIFIED, NO PAIN PRESENT: ICD-10-PCS | Mod: CPTII,S$GLB,, | Performed by: PHYSICIAN ASSISTANT

## 2023-06-21 PROCEDURE — 3075F SYST BP GE 130 - 139MM HG: CPT | Mod: CPTII,S$GLB,, | Performed by: PHYSICIAN ASSISTANT

## 2023-06-21 PROCEDURE — 3079F DIAST BP 80-89 MM HG: CPT | Mod: CPTII,S$GLB,, | Performed by: PHYSICIAN ASSISTANT

## 2023-06-21 PROCEDURE — 3079F PR MOST RECENT DIASTOLIC BLOOD PRESSURE 80-89 MM HG: ICD-10-PCS | Mod: CPTII,S$GLB,, | Performed by: PHYSICIAN ASSISTANT

## 2023-06-21 PROCEDURE — 3075F PR MOST RECENT SYSTOLIC BLOOD PRESS GE 130-139MM HG: ICD-10-PCS | Mod: CPTII,S$GLB,, | Performed by: PHYSICIAN ASSISTANT

## 2023-06-21 PROCEDURE — 1160F PR REVIEW ALL MEDS BY PRESCRIBER/CLIN PHARMACIST DOCUMENTED: ICD-10-PCS | Mod: CPTII,S$GLB,, | Performed by: PHYSICIAN ASSISTANT

## 2023-06-21 PROCEDURE — 99999 PR PBB SHADOW E&M-EST. PATIENT-LVL V: ICD-10-PCS | Mod: PBBFAC,,, | Performed by: PHYSICIAN ASSISTANT

## 2023-06-21 PROCEDURE — 1159F MED LIST DOCD IN RCRD: CPT | Mod: CPTII,S$GLB,, | Performed by: PHYSICIAN ASSISTANT

## 2023-06-21 PROCEDURE — 1126F AMNT PAIN NOTED NONE PRSNT: CPT | Mod: CPTII,S$GLB,, | Performed by: PHYSICIAN ASSISTANT

## 2023-06-21 PROCEDURE — 99999 PR PBB SHADOW E&M-EST. PATIENT-LVL V: CPT | Mod: PBBFAC,,, | Performed by: PHYSICIAN ASSISTANT

## 2023-07-07 ENCOUNTER — LAB VISIT (OUTPATIENT)
Dept: LAB | Facility: HOSPITAL | Age: 73
End: 2023-07-07
Attending: INTERNAL MEDICINE
Payer: MEDICARE

## 2023-07-07 DIAGNOSIS — C50.919 MALIGNANT NEOPLASM OF FEMALE BREAST, UNSPECIFIED ESTROGEN RECEPTOR STATUS, UNSPECIFIED LATERALITY, UNSPECIFIED SITE OF BREAST: ICD-10-CM

## 2023-07-07 DIAGNOSIS — Z79.811 LONG TERM (CURRENT) USE OF AROMATASE INHIBITORS: ICD-10-CM

## 2023-07-07 DIAGNOSIS — M85.80 OSTEOPENIA, UNSPECIFIED LOCATION: ICD-10-CM

## 2023-07-07 LAB
ALBUMIN SERPL-MCNC: 3.6 G/DL (ref 3.4–4.8)
ALBUMIN/GLOB SERPL: 1.2 RATIO (ref 1.1–2)
ALP SERPL-CCNC: 129 UNIT/L (ref 40–150)
ALT SERPL-CCNC: 20 UNIT/L (ref 0–55)
AST SERPL-CCNC: 19 UNIT/L (ref 5–34)
BASOPHILS # BLD AUTO: 0.01 X10(3)/MCL
BASOPHILS NFR BLD AUTO: 0.1 %
BILIRUBIN DIRECT+TOT PNL SERPL-MCNC: 0.6 MG/DL
BUN SERPL-MCNC: 24.3 MG/DL (ref 9.8–20.1)
CALCIUM SERPL-MCNC: 9.7 MG/DL (ref 8.4–10.2)
CEA SERPL-MCNC: <1.73 NG/ML (ref 0–3)
CHLORIDE SERPL-SCNC: 106 MMOL/L (ref 98–107)
CO2 SERPL-SCNC: 28 MMOL/L (ref 23–31)
CREAT SERPL-MCNC: 1.1 MG/DL (ref 0.55–1.02)
EOSINOPHIL # BLD AUTO: 0.27 X10(3)/MCL (ref 0–0.9)
EOSINOPHIL NFR BLD AUTO: 3.1 %
ERYTHROCYTE [DISTWIDTH] IN BLOOD BY AUTOMATED COUNT: 14.8 % (ref 11.5–17)
GFR SERPLBLD CREATININE-BSD FMLA CKD-EPI: 53 MLS/MIN/1.73/M2
GLOBULIN SER-MCNC: 3 GM/DL (ref 2.4–3.5)
GLUCOSE SERPL-MCNC: 177 MG/DL (ref 82–115)
HCT VFR BLD AUTO: 40.6 % (ref 37–47)
HGB BLD-MCNC: 12.5 G/DL (ref 12–16)
IMM GRANULOCYTES # BLD AUTO: 0.01 X10(3)/MCL (ref 0–0.04)
IMM GRANULOCYTES NFR BLD AUTO: 0.1 %
LYMPHOCYTES # BLD AUTO: 2 X10(3)/MCL (ref 0.6–4.6)
LYMPHOCYTES NFR BLD AUTO: 23 %
MCH RBC QN AUTO: 27.1 PG (ref 27–31)
MCHC RBC AUTO-ENTMCNC: 30.8 G/DL (ref 33–36)
MCV RBC AUTO: 87.9 FL (ref 80–94)
MONOCYTES # BLD AUTO: 0.65 X10(3)/MCL (ref 0.1–1.3)
MONOCYTES NFR BLD AUTO: 7.5 %
NEUTROPHILS # BLD AUTO: 5.76 X10(3)/MCL (ref 2.1–9.2)
NEUTROPHILS NFR BLD AUTO: 66.2 %
PLATELET # BLD AUTO: 241 X10(3)/MCL (ref 130–400)
PMV BLD AUTO: 9.8 FL (ref 7.4–10.4)
POTASSIUM SERPL-SCNC: 4.2 MMOL/L (ref 3.5–5.1)
PROT SERPL-MCNC: 6.6 GM/DL (ref 5.8–7.6)
RBC # BLD AUTO: 4.62 X10(6)/MCL (ref 4.2–5.4)
SODIUM SERPL-SCNC: 140 MMOL/L (ref 136–145)
WBC # SPEC AUTO: 8.7 X10(3)/MCL (ref 4.5–11.5)

## 2023-07-07 PROCEDURE — 82378 CARCINOEMBRYONIC ANTIGEN: CPT

## 2023-07-07 PROCEDURE — 85025 COMPLETE CBC W/AUTO DIFF WBC: CPT

## 2023-07-07 PROCEDURE — 80053 COMPREHEN METABOLIC PANEL: CPT

## 2023-07-07 PROCEDURE — 36415 COLL VENOUS BLD VENIPUNCTURE: CPT

## 2023-07-07 PROCEDURE — 86300 IMMUNOASSAY TUMOR CA 15-3: CPT

## 2023-07-08 LAB — CANCER AG15-3 SERPL IA-ACNC: 13 U/ML

## 2023-07-13 ENCOUNTER — OFFICE VISIT (OUTPATIENT)
Dept: HEMATOLOGY/ONCOLOGY | Facility: CLINIC | Age: 73
End: 2023-07-13
Payer: MEDICARE

## 2023-07-13 ENCOUNTER — TELEPHONE (OUTPATIENT)
Dept: SURGERY | Facility: CLINIC | Age: 73
End: 2023-07-13
Payer: MEDICARE

## 2023-07-13 ENCOUNTER — TELEPHONE (OUTPATIENT)
Dept: HEMATOLOGY/ONCOLOGY | Facility: CLINIC | Age: 73
End: 2023-07-13

## 2023-07-13 VITALS
BODY MASS INDEX: 38.03 KG/M2 | WEIGHT: 206.69 LBS | DIASTOLIC BLOOD PRESSURE: 79 MMHG | HEIGHT: 62 IN | RESPIRATION RATE: 16 BRPM | TEMPERATURE: 98 F | OXYGEN SATURATION: 95 % | SYSTOLIC BLOOD PRESSURE: 141 MMHG | HEART RATE: 98 BPM

## 2023-07-13 DIAGNOSIS — M85.80 OSTEOPENIA, UNSPECIFIED LOCATION: ICD-10-CM

## 2023-07-13 DIAGNOSIS — Z79.811 LONG TERM (CURRENT) USE OF AROMATASE INHIBITORS: ICD-10-CM

## 2023-07-13 DIAGNOSIS — C50.919 MALIGNANT NEOPLASM OF FEMALE BREAST, UNSPECIFIED ESTROGEN RECEPTOR STATUS, UNSPECIFIED LATERALITY, UNSPECIFIED SITE OF BREAST: Primary | ICD-10-CM

## 2023-07-13 PROCEDURE — 1126F PR PAIN SEVERITY QUANTIFIED, NO PAIN PRESENT: ICD-10-PCS | Mod: CPTII,S$GLB,,

## 2023-07-13 PROCEDURE — 99215 OFFICE O/P EST HI 40 MIN: CPT | Mod: S$GLB,,,

## 2023-07-13 PROCEDURE — 1159F PR MEDICATION LIST DOCUMENTED IN MEDICAL RECORD: ICD-10-PCS | Mod: CPTII,S$GLB,,

## 2023-07-13 PROCEDURE — 1159F MED LIST DOCD IN RCRD: CPT | Mod: CPTII,S$GLB,,

## 2023-07-13 PROCEDURE — 3077F SYST BP >= 140 MM HG: CPT | Mod: CPTII,S$GLB,,

## 2023-07-13 PROCEDURE — 3078F PR MOST RECENT DIASTOLIC BLOOD PRESSURE < 80 MM HG: ICD-10-PCS | Mod: CPTII,S$GLB,,

## 2023-07-13 PROCEDURE — 1126F AMNT PAIN NOTED NONE PRSNT: CPT | Mod: CPTII,S$GLB,,

## 2023-07-13 PROCEDURE — 3008F PR BODY MASS INDEX (BMI) DOCUMENTED: ICD-10-PCS | Mod: CPTII,S$GLB,,

## 2023-07-13 PROCEDURE — 99215 PR OFFICE/OUTPT VISIT, EST, LEVL V, 40-54 MIN: ICD-10-PCS | Mod: S$GLB,,,

## 2023-07-13 PROCEDURE — 99999 PR PBB SHADOW E&M-EST. PATIENT-LVL IV: CPT | Mod: PBBFAC,,,

## 2023-07-13 PROCEDURE — 99999 PR PBB SHADOW E&M-EST. PATIENT-LVL IV: ICD-10-PCS | Mod: PBBFAC,,,

## 2023-07-13 PROCEDURE — 3008F BODY MASS INDEX DOCD: CPT | Mod: CPTII,S$GLB,,

## 2023-07-13 PROCEDURE — 3078F DIAST BP <80 MM HG: CPT | Mod: CPTII,S$GLB,,

## 2023-07-13 PROCEDURE — 3077F PR MOST RECENT SYSTOLIC BLOOD PRESSURE >= 140 MM HG: ICD-10-PCS | Mod: CPTII,S$GLB,,

## 2023-07-13 NOTE — TELEPHONE ENCOUNTER
Patient called to schedule Mediport Removal.  Patient states that she is available on 7/17/2023 at 1000. Patient instructed to hold Aspirin, and Multivitamin until after Monday.  She verbalized understanding.

## 2023-07-13 NOTE — PROGRESS NOTES
Subjective:       Patient ID: Elina Paris is a 73 y.o. female.    Chief Complaint: follow up        Diagnosis:  1.  pT1c, N0, M0 stage IA triple positive invasive ductal carcinoma of the left breast.    Current Treatment:    Adjuvant endocrine therapy started October 2021- anastrozole      Treatment History:  1.  Weekly taxol and herceptin for 12 weeks followed by a year of maintenance herceptin.  Week 1 given on 5/13/2021, week 12 given on 8/19/2021.  Maintenance Herceptin started on 9/9/2021 and completed 5/19/22  2. Adjuvant radiation therapy to start on 9/2/2021, completed 9/30/2021.    Follow-up  Pertinent negatives include no abdominal pain, arthralgias, chest pain, chills, congestion, coughing, diaphoresis, fatigue, fever, headaches, numbness, rash, sore throat or weakness.   Breast Cancer  Pertinent negatives include no abdominal pain, arthralgias, chest pain, chills, congestion, coughing, diaphoresis, fatigue, fever, headaches, numbness, rash, sore throat or weakness.      HPI:  Patient who underwent a screening mammogram on 12/9/2020 that showed multiple morphologically oval/circumscribed masses throughout both breasts.  This was read as BI-RADS 0, additional imaging recommended.  Diagnostic mammogram and ultrasound of the left breast on 1/4/2021 was performed and showed a 1.6 cm irregular mass at the 3 o'clock position, a 1.0 cm oval mass with obscured margins at the 3 o'clock position and a 0.6 cm irregular mass at the 2 o'clock position.  On ultrasound, the 3:00 lesion 8 cm from the nipple measured 1.6 x 1.2 x 1.2 cm.  This was irregular with angular/microlobulated margins.  There was also posterior shadowing.  The 3 o'clock position 3 cm from the nipple showed a 1.0 x 1.0 x 0.7 cm oval circumscribed mass with internal septation.  The 2 o'clock position lesion 5 cm from the nipple measured 0.6 x 0.5 x 0.7 cm, was avascular with peripheral hyper echogenicity and central hypoechogenicity and  indistinct margins.  BI-RADS 4, biopsy recommended.  Patient underwent biopsy on 1/14/2021, the 3 o'clock position lesion 8 cm from the nipple revealed invasive ductal carcinoma.  The left breast 3 o'clock position 3 cm from the nipple returned as invasive ductal carcinoma.  The left breast lesion at the 2 o'clock position showed benign breast tissue.  Estrogen receptor was 99.74% positive, progesterone preceptor was 98.57% positive, HER-2 was 2+ by IHC and positive by FISH.  Patient then underwent bilateral breast MRI on 2/25/2021 which revealed biopsy-proven malignancy at the 3 o'clock position in the left breast spanning 5.7 cm without suspicious enhancement in either breast.  Patient then underwent a left sided lumpectomy on 3/8/2021, final pathology revealed a pT1c, N0, M0 stage IA triple positive invasive ductal carcinoma.  The patient subsequently had a bilateral breast reduction.  This was done by Dr. Manuel Mclean.  She was referred to medical oncology.  I saw the patient on 3/31/2021.  Baseline DEXA scan completed in 4/8/2021 showed mild osteopenia in the left and right femoral necks.  Echocardiogram completed on 4/14/2021 showed EF of 55-60%.  Taxol and herceptin started on 5/13/2021, week 12 given on 8/19/2021.  Repeat ECHO on 8/31/2021 showed EF of 55-60%.  Radiation started on 9/2/2021.  Started maintenance Herceptin on 9/9/2021.  Completed radiation on 9/30/2021.  Started adjuvant endocrine therapy in October 2021.  Repeat ECHOs have been done on a regular basis and showed no true decrease in ejection fraction, most recently on 2/25/2022 with ejection fraction of 55%.        Interval History:  Patient presents to clinic for scheduled follow up appointment for breast cancer. She is overall feeling well.  She denies hot flashes. She has arthralgia to bilateral knees and hips early mornings and if sitting for long periods of time.  Pain resolved with Tylenol if taken.  Encouraged to remain active.   She remains on anastrozole and continues to tolerate it well. She is adherent to calcium.  Her left breast remains red with minimal tenderness; however she says it has improved. She applies a moisturizing cream q hs. Her medi port is scheduled to be removed on 7/17 by Dr Delgado.  She denies any new complaints.      Past Medical History:   Diagnosis Date    Anxiety     Breast cancer     CAD (coronary artery disease)     COPD (chronic obstructive pulmonary disease)     GERD (gastroesophageal reflux disease)     HLD (hyperlipidemia)     Hypertension     Hypothyroidism       Past Surgical History:   Procedure Laterality Date    BREAST BIOPSY  12/2022    CHOLECYSTECTOMY      COLONOSCOPY  07/06/2016    HYSTERECTOMY      MASTECTOMY, PARTIAL  03/08/2021    MEDIPORT INSERTION, SINGLE  08/2021    REDUCTION OF BOTH BREASTS Bilateral 03/08/2021    TONSILLECTOMY      TOTAL KNEE ARTHROPLASTY Bilateral      Social History     Socioeconomic History    Marital status:    Tobacco Use    Smoking status: Former     Types: Cigarettes    Smokeless tobacco: Never   Substance and Sexual Activity    Alcohol use: Never    Drug use: Never      Family History   Problem Relation Age of Onset    Cancer Mother 68    Coronary artery disease Mother     Hypertension Mother     Hypertension Father     Coronary artery disease Father     Cancer Brother     Hypertension Brother       Review of patient's allergies indicates:  No Known Allergies   Review of Systems   Constitutional:  Negative for appetite change, chills, diaphoresis, fatigue, fever and unexpected weight change.   HENT:  Negative for nasal congestion, mouth sores, sinus pressure/congestion and sore throat.    Eyes:  Negative for pain and visual disturbance.   Respiratory:  Negative for cough, chest tightness and shortness of breath.    Cardiovascular:  Negative for chest pain, palpitations and leg swelling.   Gastrointestinal:  Negative for abdominal distention, abdominal pain,  blood in stool, constipation and diarrhea.   Genitourinary:  Negative for dysuria, frequency and hematuria.   Musculoskeletal:  Negative for arthralgias and back pain.   Integumentary:  Negative for rash.   Neurological:  Negative for dizziness, weakness, numbness and headaches.   Hematological:  Negative for adenopathy.   Psychiatric/Behavioral:  Negative for confusion. The patient is not nervous/anxious.        Objective:      Vitals:    07/13/23 1101   BP: (!) 141/79   Pulse: 98   Resp: 16   Temp: 98 °F (36.7 °C)     Physical Exam  Vitals reviewed.   Constitutional:       General: She is not in acute distress.     Appearance: Normal appearance.   HENT:      Head: Normocephalic and atraumatic.      Nose: Nose normal.   Eyes:      Extraocular Movements: Extraocular movements intact.      Conjunctiva/sclera: Conjunctivae normal.   Cardiovascular:      Rate and Rhythm: Normal rate and regular rhythm.      Heart sounds: Normal heart sounds.   Pulmonary:      Effort: Pulmonary effort is normal.      Breath sounds: Normal breath sounds.   Chest:      Comments: Left breast with radiation skin changes (erythema) and scarring, no suspicious masses.  Right breast status post reduction with no suspicious masses or skin changes.  Abdominal:      General: Bowel sounds are normal.      Palpations: Abdomen is soft.   Musculoskeletal:         General: No swelling. Normal range of motion.      Cervical back: Normal range of motion and neck supple.      Right lower leg: No edema.      Left lower leg: No edema.   Lymphadenopathy:      Cervical: No cervical adenopathy.      Upper Body:      Right upper body: No supraclavicular or axillary adenopathy.      Left upper body: No supraclavicular or axillary adenopathy.   Skin:     General: Skin is warm and dry.   Neurological:      General: No focal deficit present.      Mental Status: She is alert and oriented to person, place, and time. Mental status is at baseline.   Psychiatric:          Mood and Affect: Mood normal.         Behavior: Behavior normal.     LABS AND IMAGING REVIEWED IN EPIC        Assessment:     1.  pT1c, N0, M0 stage IA triple positive invasive ductal carcinoma of the left breast.        Plan:         Treated with Taxol and Herceptin weekly for 12 weeks, completed on 8/19/2021.  Radiation started on 9/2/2021, finished on 9/30/2021.       Continue endocrine therapy as prescribed, started October 2021. Will complete maintenance Herceptin on 5/19/2022.    Echocardiograms done every 3 months during treatment with Herceptin, most recently on 6/8/2022 with ejection fraction of 58%.    Baseline DEXA scan completed in 4/8/2021 showed mild osteopenia of the left and right femoral neck. Repeat done 4/3/2023 with continued osteopenia.  We will continue to monitor.  She will continue calcium for bone health.    Started maintenance Herceptin on 9/9/2021 and completed 1 year worth in May 2022.      Follow up with Dr Delgado as scheduled for Medi port removal 7/17    MM scheduled for 11/13    CBC, CMP, CEA, CA 15-3 prior to follow up    Return to clinic with MD in 3 months     EDI Celestin  Hematology/ Oncology  [facility]

## 2023-07-17 ENCOUNTER — PROCEDURE VISIT (OUTPATIENT)
Dept: SURGERY | Facility: CLINIC | Age: 73
End: 2023-07-17
Payer: MEDICARE

## 2023-07-17 VITALS
WEIGHT: 204.63 LBS | BODY MASS INDEX: 37.66 KG/M2 | SYSTOLIC BLOOD PRESSURE: 143 MMHG | DIASTOLIC BLOOD PRESSURE: 84 MMHG | HEART RATE: 86 BPM | RESPIRATION RATE: 20 BRPM | TEMPERATURE: 98 F | HEIGHT: 62 IN | OXYGEN SATURATION: 98 %

## 2023-07-17 DIAGNOSIS — Z45.2 ENCOUNTER FOR REMOVAL OF TUNNELED CENTRAL VENOUS CATHETER (CVC) WITH PORT: Primary | ICD-10-CM

## 2023-07-17 PROCEDURE — 36590 PR REMOVAL TUNNELED CV CATH W SUBQ PORT OR PUMP: ICD-10-PCS | Mod: S$GLB,,, | Performed by: SURGERY

## 2023-07-17 PROCEDURE — 36590 REMOVAL TUNNELED CV CATH: CPT | Mod: S$GLB,,, | Performed by: SURGERY

## 2023-07-17 RX ORDER — LEVOTHYROXINE SODIUM 175 UG/1
175 TABLET ORAL
COMMUNITY
Start: 2023-07-06

## 2023-07-17 RX ORDER — DOXYCYCLINE 100 MG/1
1 TABLET ORAL 2 TIMES DAILY
COMMUNITY
Start: 2023-07-10

## 2023-07-17 NOTE — PROCEDURES
Removal, Portacath    Date/Time: 7/17/2023 10:00 AM  Performed by: Marifer Delgado MD  Authorized by: Marifer Delgado MD     Consent Done?:  Yes (Written)  Timeout: prior to procedure the correct patient, procedure, and site was verified    Prep: patient was prepped and draped in usual sterile fashion      PREOPERATIVE DIAGNOSIS:   Encounter Diagnoses   Name Primary?    Encounter for removal of tunneled central venous catheter (CVC) with port Yes        POSTOPERATIVE DIAGNOSIS: Same as above     Informed written consent was obtained prior to surgery after discussion of the risk and benefits.     ANESTHESIA: 15 ml of local infiltration of Lidocaine 1% with epinephrine     PROCEDURES PERFORMED: Removal of right subclavian vein Mediport tunneled catheter and subcutaneous port     PROCEDURE IN DETAIL:   The patient was placed supine position. The skin of the chest, at site of Mediport and previous incision was prepped and draped in standard sterile surgical fashion. An incision was planned of the right infraclavicular area about 2 fingerbreadths below the clavicle at the site of previous incision. Using Lidocaine 1% with Epinephrine the skin and subcutaneous tissues were anesthetized. The incision was made with a 15-blade. The subcutaneous tissue was dissected using sharp dissection. Hemostasis was checked and maintained. The catheter was identified with in the cavity. Using a 3-0 Monocryl suture and stitch was placed around the entry point of catheter. The catheter was there removed from the subclavian vein and clamped with hemostat. The 3-0 Monocryl suture was tied down. The subcutaneous pocket housing the Mediport was entered and the Mediport was dissected free. The Mediport and catheter were removed and photographed. Hemostasis was again checked and maintained.    The subcutaneous tissues were closed with 3-0 Monocryl and the skin closed with running 3-0 Monocryl subcuticular stitches. Dermabond was applied  followed by sterile dressings.         ESTIMATED BLOOD LOSS: 1 ml          Condition: Good     Disposition: Patient tolerated the procedure well with no immediate complications     Post-procedure instructions were provided  Remove outer dressings in 24 hours  May shower after 24 hours, no baths for 2 weeks (do not submerge under water for 2 weeks)  Steri-strips to remain for up to 7 days and can be removed if they have not fallen off by 7 days.  Apply ice pack as needed for pain - 20 minutes at a time  Over the counter pain medications - ibuprofen or Tylenol as needed      All of questions were answered    Marifer Delgado MD  Breast Surgical Oncology

## 2023-10-04 DIAGNOSIS — Z17.0 MALIGNANT NEOPLASM OF LEFT BREAST IN FEMALE, ESTROGEN RECEPTOR POSITIVE, UNSPECIFIED SITE OF BREAST: ICD-10-CM

## 2023-10-04 DIAGNOSIS — C50.912 MALIGNANT NEOPLASM OF LEFT BREAST IN FEMALE, ESTROGEN RECEPTOR POSITIVE, UNSPECIFIED SITE OF BREAST: ICD-10-CM

## 2023-10-04 RX ORDER — ANASTROZOLE 1 MG/1
TABLET ORAL
Qty: 90 TABLET | Refills: 1 | Status: SHIPPED | OUTPATIENT
Start: 2023-10-04

## 2023-10-09 ENCOUNTER — LAB VISIT (OUTPATIENT)
Dept: LAB | Facility: HOSPITAL | Age: 73
End: 2023-10-09
Attending: INTERNAL MEDICINE
Payer: MEDICARE

## 2023-10-09 DIAGNOSIS — C50.919 MALIGNANT NEOPLASM OF FEMALE BREAST, UNSPECIFIED ESTROGEN RECEPTOR STATUS, UNSPECIFIED LATERALITY, UNSPECIFIED SITE OF BREAST: ICD-10-CM

## 2023-10-09 LAB
ALBUMIN SERPL-MCNC: 3.6 G/DL (ref 3.4–4.8)
ALBUMIN/GLOB SERPL: 1.2 RATIO (ref 1.1–2)
ALP SERPL-CCNC: 109 UNIT/L (ref 40–150)
ALT SERPL-CCNC: 13 UNIT/L (ref 0–55)
AST SERPL-CCNC: 17 UNIT/L (ref 5–34)
BASOPHILS # BLD AUTO: 0.01 X10(3)/MCL
BASOPHILS NFR BLD AUTO: 0.1 %
BILIRUB SERPL-MCNC: 0.4 MG/DL
BUN SERPL-MCNC: 21.8 MG/DL (ref 9.8–20.1)
CALCIUM SERPL-MCNC: 9.8 MG/DL (ref 8.4–10.2)
CEA SERPL-MCNC: <1.73 NG/ML (ref 0–3)
CHLORIDE SERPL-SCNC: 108 MMOL/L (ref 98–107)
CO2 SERPL-SCNC: 26 MMOL/L (ref 23–31)
CREAT SERPL-MCNC: 1.15 MG/DL (ref 0.55–1.02)
EOSINOPHIL # BLD AUTO: 0.2 X10(3)/MCL (ref 0–0.9)
EOSINOPHIL NFR BLD AUTO: 2.9 %
ERYTHROCYTE [DISTWIDTH] IN BLOOD BY AUTOMATED COUNT: 14 % (ref 11.5–17)
GFR SERPLBLD CREATININE-BSD FMLA CKD-EPI: 50 MLS/MIN/1.73/M2
GLOBULIN SER-MCNC: 3.1 GM/DL (ref 2.4–3.5)
GLUCOSE SERPL-MCNC: 118 MG/DL (ref 82–115)
HCT VFR BLD AUTO: 39.3 % (ref 37–47)
HGB BLD-MCNC: 12.4 G/DL (ref 12–16)
IMM GRANULOCYTES # BLD AUTO: 0.01 X10(3)/MCL (ref 0–0.04)
IMM GRANULOCYTES NFR BLD AUTO: 0.1 %
LYMPHOCYTES # BLD AUTO: 2.14 X10(3)/MCL (ref 0.6–4.6)
LYMPHOCYTES NFR BLD AUTO: 31.3 %
MCH RBC QN AUTO: 26.8 PG (ref 27–31)
MCHC RBC AUTO-ENTMCNC: 31.6 G/DL (ref 33–36)
MCV RBC AUTO: 85.1 FL (ref 80–94)
MONOCYTES # BLD AUTO: 0.55 X10(3)/MCL (ref 0.1–1.3)
MONOCYTES NFR BLD AUTO: 8.1 %
NEUTROPHILS # BLD AUTO: 3.92 X10(3)/MCL (ref 2.1–9.2)
NEUTROPHILS NFR BLD AUTO: 57.5 %
PLATELET # BLD AUTO: 229 X10(3)/MCL (ref 130–400)
PMV BLD AUTO: 9.7 FL (ref 7.4–10.4)
POTASSIUM SERPL-SCNC: 3.4 MMOL/L (ref 3.5–5.1)
PROT SERPL-MCNC: 6.7 GM/DL (ref 5.8–7.6)
RBC # BLD AUTO: 4.62 X10(6)/MCL (ref 4.2–5.4)
SODIUM SERPL-SCNC: 143 MMOL/L (ref 136–145)
WBC # SPEC AUTO: 6.83 X10(3)/MCL (ref 4.5–11.5)

## 2023-10-09 PROCEDURE — 80053 COMPREHEN METABOLIC PANEL: CPT

## 2023-10-09 PROCEDURE — 36415 COLL VENOUS BLD VENIPUNCTURE: CPT

## 2023-10-09 PROCEDURE — 82378 CARCINOEMBRYONIC ANTIGEN: CPT

## 2023-10-09 PROCEDURE — 85025 COMPLETE CBC W/AUTO DIFF WBC: CPT

## 2023-10-09 PROCEDURE — 86300 IMMUNOASSAY TUMOR CA 15-3: CPT

## 2023-10-10 LAB — CANCER AG15-3 SERPL IA-ACNC: 13 U/ML

## 2023-10-13 NOTE — PROGRESS NOTES
Subjective:       Patient ID: Elina Paris is a 73 y.o. female.    Chief Complaint: Follow-up (Patient reports sensitive breast. )        Diagnosis:  pT1c, N0, M0 stage IA triple positive invasive ductal carcinoma of the left breast.    Current Treatment:    1. Adjuvant endocrine therapy started October 2021- anastrozole      Treatment History:  Weekly taxol and herceptin for 12 weeks followed by a year of maintenance herceptin.  Week 1 given on 5/13/2021, week 12 given on 8/19/2021.  Maintenance Herceptin started on 9/9/2021 and completed 5/19/2022  Adjuvant radiation therapy to start on 9/2/2021, completed 9/30/2021.    Follow-up  Pertinent negatives include no abdominal pain, arthralgias, chest pain, chills, congestion, coughing, diaphoresis, fatigue, fever, headaches, numbness, rash, sore throat or weakness.   Breast Cancer  Pertinent negatives include no abdominal pain, arthralgias, chest pain, chills, congestion, coughing, diaphoresis, fatigue, fever, headaches, numbness, rash, sore throat or weakness.        HPI:  Patient who underwent a screening mammogram on 12/9/2020 that showed multiple morphologically oval/circumscribed masses throughout both breasts.  This was read as BI-RADS 0, additional imaging recommended.  Diagnostic mammogram and ultrasound of the left breast on 1/4/2021 was performed and showed a 1.6 cm irregular mass at the 3 o'clock position, a 1.0 cm oval mass with obscured margins at the 3 o'clock position and a 0.6 cm irregular mass at the 2 o'clock position.  On ultrasound, the 3:00 lesion 8 cm from the nipple measured 1.6 x 1.2 x 1.2 cm.  This was irregular with angular/microlobulated margins.  There was also posterior shadowing.  The 3 o'clock position 3 cm from the nipple showed a 1.0 x 1.0 x 0.7 cm oval circumscribed mass with internal septation.  The 2 o'clock position lesion 5 cm from the nipple measured 0.6 x 0.5 x 0.7 cm, was avascular with peripheral hyper echogenicity and  central hypoechogenicity and indistinct margins.  BI-RADS 4, biopsy recommended.  Patient underwent biopsy on 1/14/2021, the 3 o'clock position lesion 8 cm from the nipple revealed invasive ductal carcinoma.  The left breast 3 o'clock position 3 cm from the nipple returned as invasive ductal carcinoma.  The left breast lesion at the 2 o'clock position showed benign breast tissue.  Estrogen receptor was 99.74% positive, progesterone preceptor was 98.57% positive, HER-2 was 2+ by IHC and positive by FISH.  Patient then underwent bilateral breast MRI on 2/25/2021 which revealed biopsy-proven malignancy at the 3 o'clock position in the left breast spanning 5.7 cm without suspicious enhancement in either breast.  Patient then underwent a left sided lumpectomy on 3/8/2021, final pathology revealed a pT1c, N0, M0 stage IA triple positive invasive ductal carcinoma.  The patient subsequently had a bilateral breast reduction.  This was done by Dr. Manuel Mclean.  She was referred to medical oncology.  I saw the patient on 3/31/2021.  Baseline DEXA scan completed in 4/8/2021 showed mild osteopenia in the left and right femoral necks.  Echocardiogram completed on 4/14/2021 showed EF of 55-60%.  Taxol and herceptin started on 5/13/2021, week 12 given on 8/19/2021.  Repeat ECHO on 8/31/2021 showed EF of 55-60%.  Radiation started on 9/2/2021.  Started maintenance Herceptin on 9/9/2021.  Completed radiation on 9/30/2021.  Started adjuvant endocrine therapy in October 2021.  Repeat ECHOs have been done on a regular basis and showed no true decrease in ejection fraction, most recently on 2/25/2022 with ejection fraction of 55%.        Interval History:  Patient presents to clinic for scheduled follow up appointment for breast cancer. She is feeling good and denies any new complaints.  She remains on anastrozole and continues to tolerate it well. She denies any new complaints.      Past Medical History:   Diagnosis Date    Anxiety      Breast cancer     CAD (coronary artery disease)     COPD (chronic obstructive pulmonary disease)     GERD (gastroesophageal reflux disease)     HLD (hyperlipidemia)     Hypertension     Hypothyroidism       Past Surgical History:   Procedure Laterality Date    BREAST BIOPSY  12/2022    CHOLECYSTECTOMY      COLONOSCOPY  07/06/2016    HYSTERECTOMY      MASTECTOMY, PARTIAL  03/08/2021    MEDIPORT INSERTION, SINGLE  08/2021    REDUCTION OF BOTH BREASTS Bilateral 03/08/2021    TONSILLECTOMY      TOTAL KNEE ARTHROPLASTY Bilateral      Social History     Socioeconomic History    Marital status:    Tobacco Use    Smoking status: Former     Types: Cigarettes    Smokeless tobacco: Never   Substance and Sexual Activity    Alcohol use: Never    Drug use: Never      Family History   Problem Relation Age of Onset    Cancer Mother 68    Coronary artery disease Mother     Hypertension Mother     Hypertension Father     Coronary artery disease Father     Cancer Brother     Hypertension Brother       Review of patient's allergies indicates:  No Known Allergies   Review of Systems   Constitutional:  Negative for appetite change, chills, diaphoresis, fatigue, fever and unexpected weight change.   HENT:  Negative for nasal congestion, mouth sores, sinus pressure/congestion and sore throat.    Eyes:  Negative for pain and visual disturbance.   Respiratory:  Negative for cough, chest tightness and shortness of breath.    Cardiovascular:  Negative for chest pain, palpitations and leg swelling.   Gastrointestinal:  Negative for abdominal distention, abdominal pain, blood in stool, constipation and diarrhea.   Genitourinary:  Negative for dysuria, frequency and hematuria.   Musculoskeletal:  Negative for arthralgias and back pain.   Integumentary:  Negative for rash.   Neurological:  Negative for dizziness, weakness, numbness and headaches.   Hematological:  Negative for adenopathy.   Psychiatric/Behavioral:  Negative for  confusion. The patient is not nervous/anxious.          Objective:      Physical Exam  Vitals reviewed. Exam conducted with a chaperone present.   Constitutional:       General: She is not in acute distress.     Appearance: Normal appearance.   HENT:      Head: Normocephalic and atraumatic.      Nose: Nose normal.      Mouth/Throat:      Mouth: Mucous membranes are moist.   Eyes:      Extraocular Movements: Extraocular movements intact.      Conjunctiva/sclera: Conjunctivae normal.   Cardiovascular:      Rate and Rhythm: Normal rate and regular rhythm.      Pulses: Normal pulses.      Heart sounds: Normal heart sounds.   Pulmonary:      Effort: Pulmonary effort is normal.      Breath sounds: Normal breath sounds.   Abdominal:      General: Bowel sounds are normal.      Palpations: Abdomen is soft.   Musculoskeletal:         General: No swelling. Normal range of motion.      Cervical back: Normal range of motion and neck supple.      Right lower leg: No edema.      Left lower leg: No edema.   Lymphadenopathy:      Cervical: No cervical adenopathy.   Skin:     General: Skin is warm and dry.   Neurological:      General: No focal deficit present.      Mental Status: She is alert and oriented to person, place, and time. Mental status is at baseline.   Psychiatric:         Mood and Affect: Mood normal.         Behavior: Behavior normal.       LABS AND IMAGING REVIEWED IN EPIC        Assessment:     1.  pT1c, N0, M0 stage IA triple positive invasive ductal carcinoma of the left breast.        Plan:         Treated with Taxol and Herceptin weekly for 12 weeks, completed on 8/19/2021.  Radiation started on 9/2/2021, finished on 9/30/2021.       Continue endocrine therapy as prescribed, started October 2021. Completed maintenance Herceptin on 5/19/2022.    Echocardiograms done every 3 months during treatment with Herceptin, most recently on 6/8/2022 with ejection fraction of 58%.    Baseline DEXA scan completed in 4/8/2021  showed mild osteopenia of the left and right femoral neck. Repeat done 4/3/2023 with continued osteopenia.  We will continue to monitor.  She will continue calcium for bone health.  She does not like shots and would like to continue calcium and vitamin D and see if her osteopenia improves. If repeat DEXA in 04/2024 shows continued osteopenia, she stated that she would agree to prolia.     Started maintenance Herceptin on 9/9/2021 and completed 1 year worth in May 2022.      Mammogram scheduled on 11/13/2023    CBC, CMP, CEA, CA 15-3 prior to follow up    Return to clinic in 6 months with repeat DEXA scan    Scout Prasad II, MD

## 2023-10-16 ENCOUNTER — OFFICE VISIT (OUTPATIENT)
Dept: HEMATOLOGY/ONCOLOGY | Facility: CLINIC | Age: 73
End: 2023-10-16
Payer: MEDICARE

## 2023-10-16 VITALS
RESPIRATION RATE: 18 BRPM | WEIGHT: 200.5 LBS | HEART RATE: 84 BPM | SYSTOLIC BLOOD PRESSURE: 127 MMHG | DIASTOLIC BLOOD PRESSURE: 82 MMHG | BODY MASS INDEX: 36.9 KG/M2 | HEIGHT: 62 IN | TEMPERATURE: 98 F | OXYGEN SATURATION: 97 %

## 2023-10-16 DIAGNOSIS — M85.80 OSTEOPENIA, UNSPECIFIED LOCATION: ICD-10-CM

## 2023-10-16 DIAGNOSIS — C50.919 MALIGNANT NEOPLASM OF FEMALE BREAST, UNSPECIFIED ESTROGEN RECEPTOR STATUS, UNSPECIFIED LATERALITY, UNSPECIFIED SITE OF BREAST: Primary | ICD-10-CM

## 2023-10-16 DIAGNOSIS — Z78.0 POST-MENOPAUSE: ICD-10-CM

## 2023-10-16 PROCEDURE — 3079F PR MOST RECENT DIASTOLIC BLOOD PRESSURE 80-89 MM HG: ICD-10-PCS | Mod: CPTII,S$GLB,, | Performed by: INTERNAL MEDICINE

## 2023-10-16 PROCEDURE — 99999 PR PBB SHADOW E&M-EST. PATIENT-LVL V: ICD-10-PCS | Mod: PBBFAC,,, | Performed by: INTERNAL MEDICINE

## 2023-10-16 PROCEDURE — 1101F PR PT FALLS ASSESS DOC 0-1 FALLS W/OUT INJ PAST YR: ICD-10-PCS | Mod: CPTII,S$GLB,, | Performed by: INTERNAL MEDICINE

## 2023-10-16 PROCEDURE — 99214 OFFICE O/P EST MOD 30 MIN: CPT | Mod: S$GLB,,, | Performed by: INTERNAL MEDICINE

## 2023-10-16 PROCEDURE — 3079F DIAST BP 80-89 MM HG: CPT | Mod: CPTII,S$GLB,, | Performed by: INTERNAL MEDICINE

## 2023-10-16 PROCEDURE — 3074F SYST BP LT 130 MM HG: CPT | Mod: CPTII,S$GLB,, | Performed by: INTERNAL MEDICINE

## 2023-10-16 PROCEDURE — 1101F PT FALLS ASSESS-DOCD LE1/YR: CPT | Mod: CPTII,S$GLB,, | Performed by: INTERNAL MEDICINE

## 2023-10-16 PROCEDURE — 1160F PR REVIEW ALL MEDS BY PRESCRIBER/CLIN PHARMACIST DOCUMENTED: ICD-10-PCS | Mod: CPTII,S$GLB,, | Performed by: INTERNAL MEDICINE

## 2023-10-16 PROCEDURE — 3008F PR BODY MASS INDEX (BMI) DOCUMENTED: ICD-10-PCS | Mod: CPTII,S$GLB,, | Performed by: INTERNAL MEDICINE

## 2023-10-16 PROCEDURE — 99214 PR OFFICE/OUTPT VISIT, EST, LEVL IV, 30-39 MIN: ICD-10-PCS | Mod: S$GLB,,, | Performed by: INTERNAL MEDICINE

## 2023-10-16 PROCEDURE — 1159F MED LIST DOCD IN RCRD: CPT | Mod: CPTII,S$GLB,, | Performed by: INTERNAL MEDICINE

## 2023-10-16 PROCEDURE — 3288F FALL RISK ASSESSMENT DOCD: CPT | Mod: CPTII,S$GLB,, | Performed by: INTERNAL MEDICINE

## 2023-10-16 PROCEDURE — 1159F PR MEDICATION LIST DOCUMENTED IN MEDICAL RECORD: ICD-10-PCS | Mod: CPTII,S$GLB,, | Performed by: INTERNAL MEDICINE

## 2023-10-16 PROCEDURE — 3288F PR FALLS RISK ASSESSMENT DOCUMENTED: ICD-10-PCS | Mod: CPTII,S$GLB,, | Performed by: INTERNAL MEDICINE

## 2023-10-16 PROCEDURE — 1160F RVW MEDS BY RX/DR IN RCRD: CPT | Mod: CPTII,S$GLB,, | Performed by: INTERNAL MEDICINE

## 2023-10-16 PROCEDURE — 3008F BODY MASS INDEX DOCD: CPT | Mod: CPTII,S$GLB,, | Performed by: INTERNAL MEDICINE

## 2023-10-16 PROCEDURE — 3074F PR MOST RECENT SYSTOLIC BLOOD PRESSURE < 130 MM HG: ICD-10-PCS | Mod: CPTII,S$GLB,, | Performed by: INTERNAL MEDICINE

## 2023-10-16 PROCEDURE — 99999 PR PBB SHADOW E&M-EST. PATIENT-LVL V: CPT | Mod: PBBFAC,,, | Performed by: INTERNAL MEDICINE

## 2023-10-16 RX ORDER — FLUCONAZOLE 150 MG/1
150 TABLET ORAL EVERY OTHER DAY
COMMUNITY
Start: 2023-09-20

## 2023-11-13 ENCOUNTER — HOSPITAL ENCOUNTER (OUTPATIENT)
Dept: RADIOLOGY | Facility: HOSPITAL | Age: 73
Discharge: HOME | End: 2023-11-13
Attending: PHYSICIAN ASSISTANT
Payer: MEDICARE

## 2023-11-13 DIAGNOSIS — C50.812 MALIGNANT NEOPLASM OF OVERLAPPING SITES OF LEFT BREAST IN FEMALE, ESTROGEN RECEPTOR POSITIVE: ICD-10-CM

## 2023-11-13 DIAGNOSIS — Z17.0 MALIGNANT NEOPLASM OF OVERLAPPING SITES OF LEFT BREAST IN FEMALE, ESTROGEN RECEPTOR POSITIVE: ICD-10-CM

## 2023-11-13 PROCEDURE — 77062 BREAST TOMOSYNTHESIS BI: CPT | Mod: 26,,, | Performed by: RADIOLOGY

## 2023-11-13 PROCEDURE — 77062 BREAST TOMOSYNTHESIS BI: CPT | Mod: TC

## 2023-11-13 PROCEDURE — 77062 MAMMO DIGITAL DIAGNOSTIC BILAT WITH TOMO: ICD-10-PCS | Mod: 26,,, | Performed by: RADIOLOGY

## 2023-11-13 PROCEDURE — 77066 MAMMO DIGITAL DIAGNOSTIC BILAT WITH TOMO: ICD-10-PCS | Mod: 26,,, | Performed by: RADIOLOGY

## 2023-11-13 PROCEDURE — 77066 DX MAMMO INCL CAD BI: CPT | Mod: 26,,, | Performed by: RADIOLOGY

## 2023-12-26 ENCOUNTER — OFFICE VISIT (OUTPATIENT)
Dept: SURGERY | Facility: CLINIC | Age: 73
End: 2023-12-26
Payer: MEDICARE

## 2023-12-26 VITALS
SYSTOLIC BLOOD PRESSURE: 129 MMHG | DIASTOLIC BLOOD PRESSURE: 83 MMHG | RESPIRATION RATE: 18 BRPM | OXYGEN SATURATION: 95 % | BODY MASS INDEX: 35.98 KG/M2 | WEIGHT: 195.5 LBS | HEIGHT: 62 IN | HEART RATE: 78 BPM | TEMPERATURE: 98 F

## 2023-12-26 DIAGNOSIS — Z17.0 MALIGNANT NEOPLASM OF OVERLAPPING SITES OF LEFT BREAST IN FEMALE, ESTROGEN RECEPTOR POSITIVE: ICD-10-CM

## 2023-12-26 DIAGNOSIS — L59.8 RADIATION SKIN FIBROSIS FROM THERAPEUTIC PROCEDURE: ICD-10-CM

## 2023-12-26 DIAGNOSIS — I89.0 LYMPHEDEMA OF BREAST: ICD-10-CM

## 2023-12-26 DIAGNOSIS — Z98.890 HISTORY OF BILATERAL BREAST REDUCTION SURGERY: ICD-10-CM

## 2023-12-26 DIAGNOSIS — Z12.31 SCREENING MAMMOGRAM, ENCOUNTER FOR: Primary | ICD-10-CM

## 2023-12-26 DIAGNOSIS — Z85.3 PERSONAL HISTORY OF BREAST CANCER: ICD-10-CM

## 2023-12-26 DIAGNOSIS — C50.812 MALIGNANT NEOPLASM OF OVERLAPPING SITES OF LEFT BREAST IN FEMALE, ESTROGEN RECEPTOR POSITIVE: ICD-10-CM

## 2023-12-26 PROCEDURE — 3288F FALL RISK ASSESSMENT DOCD: CPT | Mod: CPTII,S$GLB,, | Performed by: PHYSICIAN ASSISTANT

## 2023-12-26 PROCEDURE — 99214 OFFICE O/P EST MOD 30 MIN: CPT | Mod: S$GLB,,, | Performed by: PHYSICIAN ASSISTANT

## 2023-12-26 PROCEDURE — 3008F PR BODY MASS INDEX (BMI) DOCUMENTED: ICD-10-PCS | Mod: CPTII,S$GLB,, | Performed by: PHYSICIAN ASSISTANT

## 2023-12-26 PROCEDURE — 1101F PR PT FALLS ASSESS DOC 0-1 FALLS W/OUT INJ PAST YR: ICD-10-PCS | Mod: CPTII,S$GLB,, | Performed by: PHYSICIAN ASSISTANT

## 2023-12-26 PROCEDURE — 99214 PR OFFICE/OUTPT VISIT, EST, LEVL IV, 30-39 MIN: ICD-10-PCS | Mod: S$GLB,,, | Performed by: PHYSICIAN ASSISTANT

## 2023-12-26 PROCEDURE — 99999 PR PBB SHADOW E&M-EST. PATIENT-LVL III: ICD-10-PCS | Mod: PBBFAC,,, | Performed by: PHYSICIAN ASSISTANT

## 2023-12-26 PROCEDURE — 1159F MED LIST DOCD IN RCRD: CPT | Mod: CPTII,S$GLB,, | Performed by: PHYSICIAN ASSISTANT

## 2023-12-26 PROCEDURE — 1160F RVW MEDS BY RX/DR IN RCRD: CPT | Mod: CPTII,S$GLB,, | Performed by: PHYSICIAN ASSISTANT

## 2023-12-26 PROCEDURE — 3074F SYST BP LT 130 MM HG: CPT | Mod: CPTII,S$GLB,, | Performed by: PHYSICIAN ASSISTANT

## 2023-12-26 PROCEDURE — 3288F PR FALLS RISK ASSESSMENT DOCUMENTED: ICD-10-PCS | Mod: CPTII,S$GLB,, | Performed by: PHYSICIAN ASSISTANT

## 2023-12-26 PROCEDURE — 99999 PR PBB SHADOW E&M-EST. PATIENT-LVL III: CPT | Mod: PBBFAC,,, | Performed by: PHYSICIAN ASSISTANT

## 2023-12-26 PROCEDURE — 1126F PR PAIN SEVERITY QUANTIFIED, NO PAIN PRESENT: ICD-10-PCS | Mod: CPTII,S$GLB,, | Performed by: PHYSICIAN ASSISTANT

## 2023-12-26 PROCEDURE — 4010F PR ACE/ARB THEARPY RXD/TAKEN: ICD-10-PCS | Mod: CPTII,S$GLB,, | Performed by: PHYSICIAN ASSISTANT

## 2023-12-26 PROCEDURE — 1101F PT FALLS ASSESS-DOCD LE1/YR: CPT | Mod: CPTII,S$GLB,, | Performed by: PHYSICIAN ASSISTANT

## 2023-12-26 PROCEDURE — 3008F BODY MASS INDEX DOCD: CPT | Mod: CPTII,S$GLB,, | Performed by: PHYSICIAN ASSISTANT

## 2023-12-26 PROCEDURE — 1160F PR REVIEW ALL MEDS BY PRESCRIBER/CLIN PHARMACIST DOCUMENTED: ICD-10-PCS | Mod: CPTII,S$GLB,, | Performed by: PHYSICIAN ASSISTANT

## 2023-12-26 PROCEDURE — 3074F PR MOST RECENT SYSTOLIC BLOOD PRESSURE < 130 MM HG: ICD-10-PCS | Mod: CPTII,S$GLB,, | Performed by: PHYSICIAN ASSISTANT

## 2023-12-26 PROCEDURE — 3079F DIAST BP 80-89 MM HG: CPT | Mod: CPTII,S$GLB,, | Performed by: PHYSICIAN ASSISTANT

## 2023-12-26 PROCEDURE — 1126F AMNT PAIN NOTED NONE PRSNT: CPT | Mod: CPTII,S$GLB,, | Performed by: PHYSICIAN ASSISTANT

## 2023-12-26 PROCEDURE — 4010F ACE/ARB THERAPY RXD/TAKEN: CPT | Mod: CPTII,S$GLB,, | Performed by: PHYSICIAN ASSISTANT

## 2023-12-26 PROCEDURE — 3079F PR MOST RECENT DIASTOLIC BLOOD PRESSURE 80-89 MM HG: ICD-10-PCS | Mod: CPTII,S$GLB,, | Performed by: PHYSICIAN ASSISTANT

## 2023-12-26 PROCEDURE — 1159F PR MEDICATION LIST DOCUMENTED IN MEDICAL RECORD: ICD-10-PCS | Mod: CPTII,S$GLB,, | Performed by: PHYSICIAN ASSISTANT

## 2023-12-26 RX ORDER — NYSTATIN 100000 U/G
CREAM TOPICAL
COMMUNITY
Start: 2023-11-07

## 2023-12-26 RX ORDER — LISINOPRIL 20 MG/1
1 TABLET ORAL NIGHTLY
COMMUNITY
Start: 2023-10-18

## 2023-12-26 RX ORDER — LATANOPROST 50 UG/ML
1 SOLUTION/ DROPS OPHTHALMIC
COMMUNITY
Start: 2023-10-26

## 2023-12-26 NOTE — PROGRESS NOTES
[facility] Breast Surg  Breast Surgical Oncology  Follow-Up Patient Office Visit       Referring Provider: No ref. provider found   PCP: Katiuska Foster MD   Care Team:   Medical Oncologist: Dr. Scout Prasad  Radiation Oncologist: Dr. Lopez Prepreciousop    Chief Complaint:   Chief Complaint   Patient presents with    Follow-up     Patient reports no breast related concerns         Subjective:   Treatment History:  1. Left lumpectomy with left SLNB with bilateral breast reduction on 3/8/2021.  2. Taxol and Herceptin x's 12 weeks, completed 8/19/2021.  3. Maintenance Herceptin x's 1 year started 9/9/2021 - 5/19/2022  4. Adjuvant Radiation 9/2/2021 - 9/30/2021  5. 12/7/2022 Punch biopsy to persistent redness/skin thickening of left breast since radiation - benign findings consistent with thickened fibrotic dermis    Interval History:  12/26/2023 - Elina Paris returns today for follow up and clinical breast exam. She is doing well and has no breast concerns. She reports no changes in the redness to her left breast which as been present since the time of her radiation. She also still has palpable hardness in various areas of the left breast related to scarring from her breast reduction.     HPI:  Elina Paris is a pleasant 70 Years old Female who presents with AJCC 8th ed clinical anatomic stage IA / prognostic stage IA (cT1c cN0 M0) multifocal Left invasive ductal carcinoma grade 1 at 3 o'clock 8 cm FN ER 99.74%, NV 98.57%, HER2 2 - equivocal on IHC/FISH positive and left invasive ductal carcinoma, grade 2 at 3 o'clock 3 cm FN ER 98.93%, NV 0.0%, HER2 2- equivocal on IHC/FISH positive. She is SP Left Lumpectomy with SLNB on 3/8/2021. Final pathology revealed left breast invasive ductal carcinoma , grade 1 with one foci measuring 1.8 cm and another foci measuring 2.0 cm with associated foci of DCIS. All margins were clear. Two sentinel lymph nodes negative for metastatic  carcinoma. The patient subsequently had a bilateral breast reduction. Due to HER-2 being positive, we treated with Taxol and Herceptin weekly for 12 weeks, completed on 2021. She will then complete a years worth of Herceptin, started on 2021. Radiation started on 2021, finished on 2021.    She returned 10/2022 after being seen by radiation oncology with concerns regarding redness, pain, and intermittent swelling to the left breast, which she states has been present since the time of her surgery and when she finished radiation. The swelling and pain comes and goes. She denies swelling in the left arm. She had a L DG MG to evaluate breast pain concerns in about 1 month ago which were benign. There was no detrimental interval change in the mammographic appearance of the left breast and no sonographic evidence of a mass, fluid collection, or hyperemia to suggest inflammation. She was referred to PT/OT for treatment of left breast lymphedema. With 4 weeks of therapy, she achieved resolution of pain symptoms and reports that prior skin thickening, swelling, and hardness significantly improved. However, redness it is still present and she was recommended for biopsy (benign findings consistent with thickened fibrotic dermis). This would be related to changes from radiation and lymphedema of the breast.    Imagin. 2020 BL SC MG at [facility] - which revealed multiple morphologically oval/circumscribed masses throughout both breasts which have changed in size or resolved after review of prior images, in the left breast UOQ mid depth and UOQ anterior depth two focal asymmetries are seen, and a left breast asymmetry anterior and close to the skin. No other significant masses, calcifications, or other findings in either breast. BI-RADS 0: additional imaging is recommended.    2. 21 LEFT DG MG / US LEFT BREAST LIMITED at [facility] - which revealed on L MG at 3 o'clock mid depth a 1.6 cm irregular mass, at  3 o'clock anterior depth 1.0 cm oval mass with obscured margins, and at 2 o'clock anterior depth a 0.6 cm irregular mass. On L US at 3 o'clock 8 cm FN a 1.6 cm x 1.2 cm x 1.2 cm irregular mass with angular.microlobulated margins/posterior shadowing, 3 o'clock 3 cm FN a 1.0 cm x 1.0 cm x 0.7 cm oval circumscribed mass with internal septation (consisted with complicated cyst), and 2 o'clock 5 cm FN a 0.6 cm x 0.5 cm x 0.7 cm avascular with peripheral hyperechogenicity and central hypoechogenicity and indistinct margins.    BI-RADS 4: suspicious and biopsy was recommended for 3 o'clock 8 cm FN mass and 2 o'clock 5 cm FN. US-guided aspiration of 3 o'clock 3 cm FN cyst    3. 2/25/2021 BL Breast MRI at [facility] - which revealed biopsy proven malignancy at 3 o'clock in the left breast spanning 5.7 cm without suspicious enhancement in either breast. BI-RADS 6: biopsy proven malignancy.    3. 11/8/2021 BL DG MG - BENIGN: no mammographic evidence of malignancy. Benign post surgical changes are present bilaterally.     4. 8/17/2022 L DG MG and L Breast US to evaluate left breast pain and for post lumpectomy protocol - BIRADS 2: BENIGN: Mammogram: On today's mammographic views, there are expected, benign post-therapy changes of the left breast related to prior lumpectomy, reduction mammoplasty, and radiation therapy.  No detrimental interval change in the mammographic appearance of the left breast.  No mammographic correlate for the left breast upper outer quadrant/axillary tail area of concern (pain). Ultrasound: Targeted in area of pain (11:00 axis 8 cm FN), revealing no suspicious finding.  At the patient's area of concern, there is normal fatty tissue and expected postsurgical changes.  No sonographic evidence of a mass, fluid collection, or hyperemia to suggest inflammation.  No suspicious left axillary adenopathy.     5. 11/9/2022 BL DG MG - BIRADS 2: BENIGN: No suspicious masses, calcifications, or other signs of malignancy  are identified.  Benign postlumpectomy and post reduction changes are noted in the left breast.  Benign post reduction changes are also noted in the right breast.  A subcutaneous infusion port overlies the right axilla.  No significant interval change compared to the prior examination.      Pathology:  1. Ultrasound-guided Core Biopsy 3 o'clock 8 cm FN - Invasive ductal carcinoma, grade 1  ER 99.74%  SD 98.57%  HER2 SOFIA 2 + equivocal on IHC/FISH positive    2. Ultrasound-guided Core Biopsy 3 o'clock 3 cm FN - Invasive ductal carcinoma, grade 2  ER 98.93%  SD 0.0%  HER2 SOFIA 2 + equivocal on IHC/FISH positive    3. Ultrasound-guided Core Biopsy 2 o'clock 5 cm FN - Fragments of benign breast tissue and adipose tissue  4. 3/8/2021 Left Lumpectomy with Left Paxton Lymph Node Biopsy revealed left breast invasive ductal carcinoma , grade 1 with one foci measuring 1.8 cm and another foci measuring 2.0 cm with associated foci of DCIS. All margins were clear. Two sentinel lymph nodes negative for metastatic carcinoma. The skin appears to show thickened fibrotic dermis which extends approximately 5-6 mm from the epidermal surface. The deeper fibrous tissue envelopes deep eccrine glands.    3. Skin, left breast (punch biopsy) - Skin with prominent dermal fibrous tissue. No evidence of malignancy. No glandular breast tissue identified. Only sparse perivascular lymphocytes are identified. Clinicopathologic correlation recommended.    OB/GYN History:  Menarche Onset: 11  Menopause: Post  Hormonal birth control (duration): no  Pregnancies: none  Age at first pregnancy: n/a  Child births: 0  Breastfeeding duration: no   Hysterectomy: yes  Oophorectomy: ?  HRT: no    Other:  MG breast density: Category B (Scattered fibroglandular)  Prior thoracic RT: none prior to breast cancer dx  Genetic testing: none  Ashkenazi Hindu descent: No      Family History:  Family History   Problem Relation Age of Onset    Cancer Mother 68    Coronary  artery disease Mother     Hypertension Mother     Hypertension Father     Coronary artery disease Father     Cancer Brother     Hypertension Brother         Patient History:  Past Medical History:   Diagnosis Date    Anxiety     Breast cancer     CAD (coronary artery disease)     COPD (chronic obstructive pulmonary disease)     GERD (gastroesophageal reflux disease)     HLD (hyperlipidemia)     Hypertension     Hypothyroidism        Past Surgical History:   Procedure Laterality Date    BREAST BIOPSY  12/2022    CHOLECYSTECTOMY      COLONOSCOPY  07/06/2016    HYSTERECTOMY      MASTECTOMY, PARTIAL  03/08/2021    MEDIPORT INSERTION, SINGLE  08/2021    REDUCTION OF BOTH BREASTS Bilateral 03/08/2021    TONSILLECTOMY      TOTAL KNEE ARTHROPLASTY Bilateral        Social History     Socioeconomic History    Marital status:    Tobacco Use    Smoking status: Former     Types: Cigarettes    Smokeless tobacco: Never   Substance and Sexual Activity    Alcohol use: Never    Drug use: Never       Immunization History   Administered Date(s) Administered    COVID-19, MRNA, LN-S, PF (Pfizer) (Purple Cap) 01/13/2021, 02/03/2021       Medications/Allergies:  Current Outpatient Medications on File Prior to Visit   Medication Sig Dispense Refill    acetaminophen (TYLENOL) 500 MG tablet Take 500 mg by mouth every 6 (six) hours as needed.      albuterol (PROVENTIL/VENTOLIN HFA) 90 mcg/actuation inhaler INHALE 2 PUFFS INTO THE LUNGS EVERY 6 HOURS AS NEEDED FOR WHEEZE      anastrozole (ARIMIDEX) 1 mg Tab TAKE 1 TABLET BY MOUTH EVERY DAY 90 tablet 1    aspirin (ECOTRIN) 81 MG EC tablet Take 81 mg by mouth.      atorvastatin (LIPITOR) 40 MG tablet Take 40 mg by mouth once daily.      diltiaZEM HCl (TIAZAC) 300 mg 24 hr capsule Take 1 capsule by mouth once daily.      doxycycline monohydrate 100 mg Tab Take 1 tablet by mouth 2 (two) times daily.      esomeprazole (NEXIUM) 40 MG capsule Take 1 capsule by mouth every morning.       latanoprost 0.005 % ophthalmic solution Apply 1 drop to eye.      levothyroxine (SYNTHROID, LEVOTHROID) 175 MCG tablet Take 175 mcg by mouth.      lisinopriL (PRINIVIL,ZESTRIL) 20 MG tablet Take 1 tablet by mouth every evening.      traZODone (DESYREL) 50 MG tablet Take 50 mg by mouth every evening.      TRELEGY ELLIPTA 100-62.5-25 mcg DsDv TAKE 1 PUFF BY MOUTH EVERY DAY      benzonatate (TESSALON) 100 MG capsule TAKE 1 CAPSULE (100 MG) BY ORAL ROUTE 3 TIMES PER DAY AS NEEDED FOR COUGH      cefdinir (OMNICEF) 300 MG capsule Take 300 mg by mouth 2 (two) times daily.      fluconazole (DIFLUCAN) 150 MG Tab Take 150 mg by mouth every other day.      HYDROcodone-acetaminophen (NORCO)  mg per tablet Take 1 tablet by mouth as needed (As needed).      linaCLOtide (LINZESS) 290 mcg Cap capsule Take 290 mcg by mouth.      nitroGLYCERIN (NITROSTAT) 0.4 MG SL tablet Place 0.4 mg under the tongue.      nystatin (MYCOSTATIN) cream SMARTSIG:sparingly Topical Twice Daily PRN      omeprazole (PRILOSEC) 40 MG capsule Take 40 mg by mouth.      ondansetron (ZOFRAN) 4 MG tablet Take 1 tablet by mouth as needed.      ondansetron (ZOFRAN-ODT) 4 MG TbDL Take 4 mg by mouth every 8 (eight) hours as needed.      traMADoL (ULTRAM) 50 mg tablet TAKE 1 TABLET BY MOUTH EVERY 12 HOURS AS NEEDED FOR PAIN      triamterene-hydrochlorothiazide 37.5-25 mg (MAXZIDE-25) 37.5-25 mg per tablet Take 1 tablet by mouth once daily.       No current facility-administered medications on file prior to visit.       Review of patient's allergies indicates:  No Known Allergies    Review of Systems:  Pertinent items are noted in HPI.     Objective:     Vitals:  Vitals:    12/26/23 1039   BP: 129/83   Pulse: 78   Resp: 18   Temp: 97.9 °F (36.6 °C)           Body mass index is 35.76 kg/m².     Physical Exam:  General: The patient is awake, alert and oriented times three. The patient is well nourished and in no acute distress.  Neck: There is no evidence of  palpable cervical, supraclavicular or axillary adenopathy. The neck is supple. The thyroid is not enlarged.  Musculoskeletal: The patient has a normal range of motion of her bilateral upper extremities.  Chest: Examination of the chest wall fails to reveal any obvious abnormalities. Nonlabored breathing, symmetric expansion.  Breast:  Right: There are well healed incisions from her breast reduction. There is mild diffuse tenderness to the breast. Examination of right breast fails to reveal any dominant masses or areas of significant focal nodularity. The nipple is everted without evidence of discharge. There is no skin dimpling with movement of the pectoralis. There are no significant skin changes overlying the breast.   Left: There are well healed incisions from her breast reduction. There is diffuse hyperpigmentation as well as residual redness to the areola and surrounding skin (see image). This was previously reported on physical exam 5/2022 and reportedly present since radiation. Redness is well demarcated. Hardness noted to breast reduction scars. Otherwise, examination of the left breast fails to reveal any dominant masses or areas of significant focal nodularity. The nipple is everted without evidence of discharge. There is no skin dimpling with movement of the pectoralis. There are no significant skin changes overlying the breast.  Abdomen: The abdomen is soft, flat, nontender and nondistended.  Integumentary: no rashes or skin lesions present  Neurologic: cranial nerves intact, no signs of peripheral neurological deficit, motor/sensory function intact      Image from 10/5/2022. Relatively no change seen on today's exam (12/26/2023).      Assessment and Plan:       Elina was seen today for follow-up.    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 Pathology results discussed.        Plan:     1. Continue OT/PT for lymphedema management as needed.    2. Punch biopsy of area of concern was benign and consistent with radiation and lymphedema changes. Physical exam findings are stable today. No suspicious findings on recent follow up MG. Will continue to monitor. She was advised to call if symptoms worsen or if she notices any other changes/symptoms to the breasts.    3. In the absence of significant clinical findings in the interval, a routine screening mammogram in 1 year is recommended.     4. Healthy lifestyle guidelines were reviewed. She was encouraged to engage in regular exercise, maintain a healthy body weight, and avoid excessive alcohol consumption. Healthy nutritional guidelines were also discussed. Self-breast examination was reviewed with the patient in detail and she was encouraged to perform this on a monthly basis.    5. RTC in 6 months.      All of her questions were answered.     Nadeen Philip PA-C        Total time on the date of the visit ranged from 30-39 mins ([id number]). Total time includes both face-to-face and non-face-to-face time personally spent by myself on the day of the visit.    Non-face-to-face time included:  _X_ preparing to see the patient such as reviewing the patient record  __ obtaining and reviewing separately obtained history  _X_ independently interpreting results  _X_ documenting clinical information in electronic health record.    Face-to-face time included:  _X_ performing an appropriate history and examination  _X_ communicating results to the patient  _X_ counseling and educating the patient  __ ordering appropriate medications  _x_ ordering appropriate tests  _X_ ordering appropriate procedures  (including follow-up)  _X_ answering any questions the patient had    Total Time spent on date of visit: 35 minutes

## 2024-04-04 DIAGNOSIS — C50.912 MALIGNANT NEOPLASM OF LEFT BREAST IN FEMALE, ESTROGEN RECEPTOR POSITIVE, UNSPECIFIED SITE OF BREAST: ICD-10-CM

## 2024-04-04 DIAGNOSIS — Z17.0 MALIGNANT NEOPLASM OF LEFT BREAST IN FEMALE, ESTROGEN RECEPTOR POSITIVE, UNSPECIFIED SITE OF BREAST: ICD-10-CM

## 2024-04-04 RX ORDER — ANASTROZOLE 1 MG/1
TABLET ORAL
Qty: 90 TABLET | Refills: 1 | Status: SHIPPED | OUTPATIENT
Start: 2024-04-04

## 2024-04-10 ENCOUNTER — LAB VISIT (OUTPATIENT)
Dept: LAB | Facility: HOSPITAL | Age: 74
End: 2024-04-10
Attending: INTERNAL MEDICINE
Payer: MEDICARE

## 2024-04-10 DIAGNOSIS — M85.80 OSTEOPENIA, UNSPECIFIED LOCATION: ICD-10-CM

## 2024-04-10 DIAGNOSIS — C50.919 MALIGNANT NEOPLASM OF FEMALE BREAST, UNSPECIFIED ESTROGEN RECEPTOR STATUS, UNSPECIFIED LATERALITY, UNSPECIFIED SITE OF BREAST: ICD-10-CM

## 2024-04-10 DIAGNOSIS — Z78.0 POST-MENOPAUSE: ICD-10-CM

## 2024-04-10 LAB
ALBUMIN SERPL-MCNC: 3.7 G/DL (ref 3.4–4.8)
ALBUMIN/GLOB SERPL: 1.2 RATIO (ref 1.1–2)
ALP SERPL-CCNC: 110 UNIT/L (ref 40–150)
ALT SERPL-CCNC: 11 UNIT/L (ref 0–55)
AST SERPL-CCNC: 16 UNIT/L (ref 5–34)
BASOPHILS # BLD AUTO: 0.01 X10(3)/MCL
BASOPHILS NFR BLD AUTO: 0.1 %
BILIRUB SERPL-MCNC: 0.6 MG/DL
BUN SERPL-MCNC: 42.4 MG/DL (ref 9.8–20.1)
CALCIUM SERPL-MCNC: 9.8 MG/DL (ref 8.4–10.2)
CEA SERPL-MCNC: <1.73 NG/ML (ref 0–3)
CHLORIDE SERPL-SCNC: 111 MMOL/L (ref 98–107)
CO2 SERPL-SCNC: 27 MMOL/L (ref 23–31)
CREAT SERPL-MCNC: 1.74 MG/DL (ref 0.55–1.02)
EOSINOPHIL # BLD AUTO: 0.29 X10(3)/MCL (ref 0–0.9)
EOSINOPHIL NFR BLD AUTO: 4 %
ERYTHROCYTE [DISTWIDTH] IN BLOOD BY AUTOMATED COUNT: 14.8 % (ref 11.5–17)
GFR SERPLBLD CREATININE-BSD FMLA CKD-EPI: 30 MLS/MIN/1.73/M2
GLOBULIN SER-MCNC: 3.1 GM/DL (ref 2.4–3.5)
GLUCOSE SERPL-MCNC: 93 MG/DL (ref 82–115)
HCT VFR BLD AUTO: 37.1 % (ref 37–47)
HGB BLD-MCNC: 11.8 G/DL (ref 12–16)
IMM GRANULOCYTES # BLD AUTO: 0 X10(3)/MCL (ref 0–0.04)
IMM GRANULOCYTES NFR BLD AUTO: 0 %
LYMPHOCYTES # BLD AUTO: 2.24 X10(3)/MCL (ref 0.6–4.6)
LYMPHOCYTES NFR BLD AUTO: 30.6 %
MCH RBC QN AUTO: 27.4 PG (ref 27–31)
MCHC RBC AUTO-ENTMCNC: 31.8 G/DL (ref 33–36)
MCV RBC AUTO: 86.1 FL (ref 80–94)
MONOCYTES # BLD AUTO: 0.59 X10(3)/MCL (ref 0.1–1.3)
MONOCYTES NFR BLD AUTO: 8.1 %
NEUTROPHILS # BLD AUTO: 4.18 X10(3)/MCL (ref 2.1–9.2)
NEUTROPHILS NFR BLD AUTO: 57.2 %
PLATELET # BLD AUTO: 206 X10(3)/MCL (ref 130–400)
PMV BLD AUTO: 10.4 FL (ref 7.4–10.4)
POTASSIUM SERPL-SCNC: 4.6 MMOL/L (ref 3.5–5.1)
PROT SERPL-MCNC: 6.8 GM/DL (ref 5.8–7.6)
RBC # BLD AUTO: 4.31 X10(6)/MCL (ref 4.2–5.4)
SODIUM SERPL-SCNC: 144 MMOL/L (ref 136–145)
WBC # SPEC AUTO: 7.31 X10(3)/MCL (ref 4.5–11.5)

## 2024-04-10 PROCEDURE — 36415 COLL VENOUS BLD VENIPUNCTURE: CPT

## 2024-04-10 PROCEDURE — 80053 COMPREHEN METABOLIC PANEL: CPT

## 2024-04-10 PROCEDURE — 85025 COMPLETE CBC W/AUTO DIFF WBC: CPT

## 2024-04-10 PROCEDURE — 86300 IMMUNOASSAY TUMOR CA 15-3: CPT

## 2024-04-10 PROCEDURE — 82378 CARCINOEMBRYONIC ANTIGEN: CPT

## 2024-04-11 LAB — CANCER AG15-3 SERPL IA-ACNC: 11 U/ML

## 2024-04-15 ENCOUNTER — OFFICE VISIT (OUTPATIENT)
Dept: HEMATOLOGY/ONCOLOGY | Facility: CLINIC | Age: 74
End: 2024-04-15
Payer: MEDICARE

## 2024-04-15 VITALS
DIASTOLIC BLOOD PRESSURE: 84 MMHG | HEIGHT: 62 IN | SYSTOLIC BLOOD PRESSURE: 144 MMHG | BODY MASS INDEX: 37.37 KG/M2 | WEIGHT: 203.06 LBS | RESPIRATION RATE: 18 BRPM | OXYGEN SATURATION: 99 % | HEART RATE: 76 BPM | TEMPERATURE: 98 F

## 2024-04-15 DIAGNOSIS — Z79.811 LONG TERM (CURRENT) USE OF AROMATASE INHIBITORS: ICD-10-CM

## 2024-04-15 DIAGNOSIS — C50.512 MALIGNANT NEOPLASM OF LOWER-OUTER QUADRANT OF LEFT BREAST OF FEMALE, ESTROGEN RECEPTOR POSITIVE: Primary | ICD-10-CM

## 2024-04-15 DIAGNOSIS — Z17.0 MALIGNANT NEOPLASM OF LOWER-OUTER QUADRANT OF LEFT BREAST OF FEMALE, ESTROGEN RECEPTOR POSITIVE: Primary | ICD-10-CM

## 2024-04-15 DIAGNOSIS — Z78.0 POSTMENOPAUSAL: ICD-10-CM

## 2024-04-15 DIAGNOSIS — M79.602 LEFT ARM PAIN: ICD-10-CM

## 2024-04-15 DIAGNOSIS — N28.9 FUNCTION KIDNEY DECREASED: ICD-10-CM

## 2024-04-15 DIAGNOSIS — Z17.0 MALIGNANT NEOPLASM OF LEFT BREAST IN FEMALE, ESTROGEN RECEPTOR POSITIVE, UNSPECIFIED SITE OF BREAST: ICD-10-CM

## 2024-04-15 DIAGNOSIS — M85.80 OSTEOPENIA, UNSPECIFIED LOCATION: ICD-10-CM

## 2024-04-15 DIAGNOSIS — C50.912 MALIGNANT NEOPLASM OF LEFT BREAST IN FEMALE, ESTROGEN RECEPTOR POSITIVE, UNSPECIFIED SITE OF BREAST: ICD-10-CM

## 2024-04-15 PROCEDURE — 3079F DIAST BP 80-89 MM HG: CPT | Mod: CPTII,S$GLB,, | Performed by: NURSE PRACTITIONER

## 2024-04-15 PROCEDURE — 1160F RVW MEDS BY RX/DR IN RCRD: CPT | Mod: CPTII,S$GLB,, | Performed by: NURSE PRACTITIONER

## 2024-04-15 PROCEDURE — 4010F ACE/ARB THERAPY RXD/TAKEN: CPT | Mod: CPTII,S$GLB,, | Performed by: NURSE PRACTITIONER

## 2024-04-15 PROCEDURE — 1101F PT FALLS ASSESS-DOCD LE1/YR: CPT | Mod: CPTII,S$GLB,, | Performed by: NURSE PRACTITIONER

## 2024-04-15 PROCEDURE — 1159F MED LIST DOCD IN RCRD: CPT | Mod: CPTII,S$GLB,, | Performed by: NURSE PRACTITIONER

## 2024-04-15 PROCEDURE — 99999 PR PBB SHADOW E&M-EST. PATIENT-LVL V: CPT | Mod: PBBFAC,,, | Performed by: NURSE PRACTITIONER

## 2024-04-15 PROCEDURE — 3288F FALL RISK ASSESSMENT DOCD: CPT | Mod: CPTII,S$GLB,, | Performed by: NURSE PRACTITIONER

## 2024-04-15 PROCEDURE — 99215 OFFICE O/P EST HI 40 MIN: CPT | Mod: S$GLB,,, | Performed by: NURSE PRACTITIONER

## 2024-04-15 PROCEDURE — 3077F SYST BP >= 140 MM HG: CPT | Mod: CPTII,S$GLB,, | Performed by: NURSE PRACTITIONER

## 2024-04-15 RX ORDER — LOSARTAN POTASSIUM 100 MG/1
1 TABLET ORAL EVERY MORNING
COMMUNITY

## 2024-04-15 RX ORDER — MELOXICAM 15 MG/1
15 TABLET ORAL
COMMUNITY

## 2024-04-15 RX ORDER — LEVOTHYROXINE SODIUM 125 UG/1
125 TABLET ORAL
COMMUNITY
Start: 2024-03-26

## 2024-04-15 NOTE — PROGRESS NOTES
Subjective:       Patient ID: Elina Paris is a 74 y.o. female.    Chief Complaint: Follow-up (Patient reports pain in left arm)        Diagnosis:  pT1c, N0, M0 stage IA triple positive invasive ductal carcinoma of the left breast.    Current Treatment:    1. Adjuvant endocrine therapy started October 2021- anastrozole      Treatment History:  Weekly taxol and herceptin for 12 weeks followed by a year of maintenance herceptin.  Week 1 given on 5/13/2021, week 12 given on 8/19/2021.  Maintenance Herceptin started on 9/9/2021 and completed 5/19/2022  Adjuvant radiation therapy to start on 9/2/2021, completed 9/30/2021.    Follow-up  Pertinent negatives include no abdominal pain, arthralgias, chest pain, chills, congestion, coughing, diaphoresis, fatigue, fever, headaches, numbness, rash, sore throat or weakness.   Breast Cancer  Pertinent negatives include no abdominal pain, arthralgias, chest pain, chills, congestion, coughing, diaphoresis, fatigue, fever, headaches, numbness, rash, sore throat or weakness.        HPI:  Patient who underwent a screening mammogram on 12/9/2020 that showed multiple morphologically oval/circumscribed masses throughout both breasts.  This was read as BI-RADS 0, additional imaging recommended.  Diagnostic mammogram and ultrasound of the left breast on 1/4/2021 was performed and showed a 1.6 cm irregular mass at the 3 o'clock position, a 1.0 cm oval mass with obscured margins at the 3 o'clock position and a 0.6 cm irregular mass at the 2 o'clock position.  On ultrasound, the 3:00 lesion 8 cm from the nipple measured 1.6 x 1.2 x 1.2 cm.  This was irregular with angular/microlobulated margins.  There was also posterior shadowing.  The 3 o'clock position 3 cm from the nipple showed a 1.0 x 1.0 x 0.7 cm oval circumscribed mass with internal septation.  The 2 o'clock position lesion 5 cm from the nipple measured 0.6 x 0.5 x 0.7 cm, was avascular with peripheral hyper echogenicity and  central hypoechogenicity and indistinct margins.  BI-RADS 4, biopsy recommended.  Patient underwent biopsy on 1/14/2021, the 3 o'clock position lesion 8 cm from the nipple revealed invasive ductal carcinoma.  The left breast 3 o'clock position 3 cm from the nipple returned as invasive ductal carcinoma.  The left breast lesion at the 2 o'clock position showed benign breast tissue.  Estrogen receptor was 99.74% positive, progesterone preceptor was 98.57% positive, HER-2 was 2+ by IHC and positive by FISH.  Patient then underwent bilateral breast MRI on 2/25/2021 which revealed biopsy-proven malignancy at the 3 o'clock position in the left breast spanning 5.7 cm without suspicious enhancement in either breast.  Patient then underwent a left sided lumpectomy on 3/8/2021, final pathology revealed a pT1c, N0, M0 stage IA triple positive invasive ductal carcinoma.  The patient subsequently had a bilateral breast reduction.  This was done by Dr. Manuel Mclean.  She was referred to medical oncology.  I saw the patient on 3/31/2021.  Baseline DEXA scan completed in 4/8/2021 showed mild osteopenia in the left and right femoral necks.  Echocardiogram completed on 4/14/2021 showed EF of 55-60%.  Taxol and herceptin started on 5/13/2021, week 12 given on 8/19/2021.  Repeat ECHO on 8/31/2021 showed EF of 55-60%.  Radiation started on 9/2/2021.  Started maintenance Herceptin on 9/9/2021.  Completed radiation on 9/30/2021.  Started adjuvant endocrine therapy in October 2021.  Repeat ECHOs have been done on a regular basis and showed no true decrease in ejection fraction, most recently on 2/25/2022 with ejection fraction of 55%.        Interval History:  Patient presents to clinic for scheduled follow up appointment for breast cancer. She is feeling pretty good other than left upper arm pain and decreased ROM.  She states that this began approximately 1 month ago. she  takes Tylenol as needed for the pain. She remains on  anastrozole and continues to tolerate it well.  She denies any other new pain, bowel changes or breast complaints.      Past Medical History:   Diagnosis Date    Anxiety     Breast cancer     CAD (coronary artery disease)     COPD (chronic obstructive pulmonary disease)     GERD (gastroesophageal reflux disease)     HLD (hyperlipidemia)     Hypertension     Hypothyroidism       Past Surgical History:   Procedure Laterality Date    BREAST BIOPSY  12/2022    CHOLECYSTECTOMY      COLONOSCOPY  07/06/2016    HYSTERECTOMY      MASTECTOMY, PARTIAL  03/08/2021    MEDIPORT INSERTION, SINGLE  08/2021    REDUCTION OF BOTH BREASTS Bilateral 03/08/2021    TONSILLECTOMY      TOTAL KNEE ARTHROPLASTY Bilateral      Social History     Socioeconomic History    Marital status:    Tobacco Use    Smoking status: Former     Types: Cigarettes    Smokeless tobacco: Never   Substance and Sexual Activity    Alcohol use: Never    Drug use: Never      Family History   Problem Relation Name Age of Onset    Cancer Mother  68    Coronary artery disease Mother      Hypertension Mother      Hypertension Father      Coronary artery disease Father      Cancer Brother      Hypertension Brother        Review of patient's allergies indicates:  No Known Allergies   Review of Systems   Constitutional:  Negative for appetite change, chills, diaphoresis, fatigue, fever and unexpected weight change.   HENT:  Negative for nasal congestion, mouth sores, sinus pressure/congestion and sore throat.    Eyes:  Negative for pain and visual disturbance.   Respiratory:  Negative for cough, chest tightness and shortness of breath.    Cardiovascular:  Negative for chest pain, palpitations and leg swelling.   Gastrointestinal:  Negative for abdominal distention, abdominal pain, blood in stool, constipation and diarrhea.   Genitourinary:  Negative for dysuria, frequency and hematuria.   Musculoskeletal:  Negative for arthralgias and back pain.   Integumentary:   Negative for rash.   Neurological:  Negative for dizziness, weakness, numbness and headaches.   Hematological:  Negative for adenopathy.   Psychiatric/Behavioral:  Negative for confusion. The patient is not nervous/anxious.          Objective:      Physical Exam  Vitals reviewed.   Constitutional:       General: She is not in acute distress.     Appearance: Normal appearance.   HENT:      Head: Normocephalic and atraumatic.      Nose: Nose normal.      Mouth/Throat:      Mouth: Mucous membranes are moist.   Eyes:      Extraocular Movements: Extraocular movements intact.      Conjunctiva/sclera: Conjunctivae normal.   Cardiovascular:      Rate and Rhythm: Normal rate and regular rhythm.      Pulses: Normal pulses.      Heart sounds: Normal heart sounds.   Pulmonary:      Effort: Pulmonary effort is normal.      Breath sounds: Normal breath sounds.   Chest:      Comments: Chronic red discoloration to the right breast.  Some dense scarring to the medial lower breast surgical scar.  No suspicious masses or skin changes  Abdominal:      General: Bowel sounds are normal.      Palpations: Abdomen is soft.   Musculoskeletal:         General: No swelling. Normal range of motion.      Cervical back: Normal range of motion and neck supple.      Right lower leg: No edema.      Left lower leg: No edema.   Lymphadenopathy:      Cervical: No cervical adenopathy.      Upper Body:      Right upper body: No supraclavicular or axillary adenopathy.      Left upper body: No supraclavicular or axillary adenopathy.   Skin:     General: Skin is warm and dry.   Neurological:      General: No focal deficit present.      Mental Status: She is alert and oriented to person, place, and time. Mental status is at baseline.   Psychiatric:         Mood and Affect: Mood normal.         Behavior: Behavior normal.       LABS AND IMAGING REVIEWED IN EPIC        Assessment:     1.  pT1c, N0, M0 stage IA triple positive invasive ductal carcinoma of the  left breast.        Plan:         Treated with Taxol and Herceptin weekly for 12 weeks, completed on 8/19/2021.  Radiation started on 9/2/2021, finished on 9/30/2021.       Continue endocrine therapy as prescribed, started October 2021. Completed maintenance Herceptin on 5/19/2022.    Echocardiograms done every 3 months during treatment with Herceptin, most recently on 6/8/2022 with ejection fraction of 58%.    Baseline DEXA scan completed in 4/8/2021 showed mild osteopenia of the left and right femoral neck. Repeat done 4/3/2023 with continued osteopenia.  We will continue to monitor.  She will continue calcium for bone health.  She does not like shots and would like to continue calcium and vitamin D and see if her osteopenia improves. If repeat DEXA in 04/2024 shows continued osteopenia, she stated that she would agree to prolia.  We will need to get this scheduled    Started maintenance Herceptin on 9/9/2021 and completed 1 year worth in May 2022.      We will get imaging of the left shoulder and upper arm due to new, persistent pain as well as decreased range of motion.  We will call with these results.  She may need some PT.    Refer to renal for decreased kidney function    Mammogram on 11/13/2023- BIRADS 2, benign    CBC, CMP, CEA, CA 15-3 prior to follow up    Return to clinic in 6 months     EDI Elaine

## 2024-05-24 ENCOUNTER — APPOINTMENT (OUTPATIENT)
Dept: RADIOLOGY | Facility: HOSPITAL | Age: 74
End: 2024-05-24
Attending: NURSE PRACTITIONER
Payer: MEDICARE

## 2024-05-24 DIAGNOSIS — Z17.0 MALIGNANT NEOPLASM OF LOWER-OUTER QUADRANT OF LEFT BREAST OF FEMALE, ESTROGEN RECEPTOR POSITIVE: ICD-10-CM

## 2024-05-24 DIAGNOSIS — C50.512 MALIGNANT NEOPLASM OF LOWER-OUTER QUADRANT OF LEFT BREAST OF FEMALE, ESTROGEN RECEPTOR POSITIVE: ICD-10-CM

## 2024-05-24 DIAGNOSIS — Z78.0 POSTMENOPAUSAL: ICD-10-CM

## 2024-05-24 DIAGNOSIS — Z79.811 LONG TERM (CURRENT) USE OF AROMATASE INHIBITORS: ICD-10-CM

## 2024-05-24 DIAGNOSIS — M85.80 OSTEOPENIA, UNSPECIFIED LOCATION: ICD-10-CM

## 2024-05-24 PROCEDURE — A9577 INJ MULTIHANCE: HCPCS | Performed by: NURSE PRACTITIONER

## 2024-05-24 PROCEDURE — 73223 MRI JOINT UPR EXTR W/O&W/DYE: CPT | Mod: TC,LT

## 2024-05-24 PROCEDURE — 25500020 PHARM REV CODE 255: Performed by: NURSE PRACTITIONER

## 2024-05-24 RX ADMIN — GADOBENATE DIMEGLUMINE 15 ML: 529 INJECTION, SOLUTION INTRAVENOUS at 03:05

## 2024-05-29 DIAGNOSIS — T14.8XXA TENDON TEAR: Primary | ICD-10-CM

## 2024-05-29 DIAGNOSIS — M25.519 ACUTE SHOULDER PAIN, UNSPECIFIED LATERALITY: ICD-10-CM

## 2024-10-01 DIAGNOSIS — Z17.0 MALIGNANT NEOPLASM OF LEFT BREAST IN FEMALE, ESTROGEN RECEPTOR POSITIVE, UNSPECIFIED SITE OF BREAST: ICD-10-CM

## 2024-10-01 DIAGNOSIS — C50.912 MALIGNANT NEOPLASM OF LEFT BREAST IN FEMALE, ESTROGEN RECEPTOR POSITIVE, UNSPECIFIED SITE OF BREAST: ICD-10-CM

## 2024-10-01 RX ORDER — ANASTROZOLE 1 MG/1
TABLET ORAL
Qty: 90 TABLET | Refills: 2 | Status: SHIPPED | OUTPATIENT
Start: 2024-10-01

## 2024-11-14 ENCOUNTER — HOSPITAL ENCOUNTER (OUTPATIENT)
Dept: RADIOLOGY | Facility: HOSPITAL | Age: 74
Discharge: HOME | End: 2024-11-14
Attending: PHYSICIAN ASSISTANT
Payer: MEDICARE

## 2024-11-14 DIAGNOSIS — Z12.31 SCREENING MAMMOGRAM, ENCOUNTER FOR: ICD-10-CM

## 2024-11-14 PROCEDURE — 77067 SCR MAMMO BI INCL CAD: CPT | Mod: 26,,, | Performed by: RADIOLOGY

## 2024-11-14 PROCEDURE — 77063 BREAST TOMOSYNTHESIS BI: CPT | Mod: TC

## 2024-11-14 PROCEDURE — 77063 BREAST TOMOSYNTHESIS BI: CPT | Mod: 26,,, | Performed by: RADIOLOGY

## 2024-12-16 ENCOUNTER — HOSPITAL ENCOUNTER (OUTPATIENT)
Dept: RADIOLOGY | Facility: HOSPITAL | Age: 74
Discharge: HOME | End: 2024-12-16
Attending: PHYSICIAN ASSISTANT
Payer: MEDICARE

## 2024-12-16 DIAGNOSIS — R92.8 ABNORMAL MAMMOGRAM: ICD-10-CM

## 2024-12-16 PROCEDURE — 77061 BREAST TOMOSYNTHESIS UNI: CPT | Mod: TC,RT

## 2024-12-16 PROCEDURE — 77061 BREAST TOMOSYNTHESIS UNI: CPT | Mod: 26,RT,, | Performed by: STUDENT IN AN ORGANIZED HEALTH CARE EDUCATION/TRAINING PROGRAM

## 2024-12-16 PROCEDURE — 76642 ULTRASOUND BREAST LIMITED: CPT | Mod: 26,RT,, | Performed by: STUDENT IN AN ORGANIZED HEALTH CARE EDUCATION/TRAINING PROGRAM

## 2024-12-16 PROCEDURE — 76642 ULTRASOUND BREAST LIMITED: CPT | Mod: TC,RT

## 2024-12-16 PROCEDURE — 77065 DX MAMMO INCL CAD UNI: CPT | Mod: 26,RT,, | Performed by: STUDENT IN AN ORGANIZED HEALTH CARE EDUCATION/TRAINING PROGRAM

## 2025-01-09 ENCOUNTER — OFFICE VISIT (OUTPATIENT)
Dept: SURGERY | Facility: CLINIC | Age: 75
End: 2025-01-09
Payer: MEDICARE

## 2025-01-09 VITALS
TEMPERATURE: 98 F | WEIGHT: 201 LBS | DIASTOLIC BLOOD PRESSURE: 84 MMHG | HEIGHT: 62 IN | BODY MASS INDEX: 36.99 KG/M2 | SYSTOLIC BLOOD PRESSURE: 140 MMHG | OXYGEN SATURATION: 97 % | HEART RATE: 75 BPM | RESPIRATION RATE: 18 BRPM

## 2025-01-09 DIAGNOSIS — Z12.31 SCREENING MAMMOGRAM, ENCOUNTER FOR: ICD-10-CM

## 2025-01-09 DIAGNOSIS — C50.812 MALIGNANT NEOPLASM OF OVERLAPPING SITES OF LEFT BREAST IN FEMALE, ESTROGEN RECEPTOR POSITIVE: Primary | ICD-10-CM

## 2025-01-09 DIAGNOSIS — Z98.890 HISTORY OF BILATERAL BREAST REDUCTION SURGERY: ICD-10-CM

## 2025-01-09 DIAGNOSIS — Z17.0 MALIGNANT NEOPLASM OF OVERLAPPING SITES OF LEFT BREAST IN FEMALE, ESTROGEN RECEPTOR POSITIVE: Primary | ICD-10-CM

## 2025-01-09 DIAGNOSIS — I89.0 LYMPHEDEMA OF BREAST: ICD-10-CM

## 2025-01-09 DIAGNOSIS — L59.8 RADIATION SKIN FIBROSIS FROM THERAPEUTIC PROCEDURE: ICD-10-CM

## 2025-01-09 DIAGNOSIS — N64.1 FAT NECROSIS (SEGMENTAL) OF BREAST: ICD-10-CM

## 2025-01-09 PROCEDURE — 99999 PR PBB SHADOW E&M-EST. PATIENT-LVL III: CPT | Mod: PBBFAC,,, | Performed by: PHYSICIAN ASSISTANT

## 2025-01-09 RX ORDER — AMLODIPINE BESYLATE 5 MG/1
TABLET ORAL
COMMUNITY
Start: 2024-10-30

## 2025-01-09 RX ORDER — LEVOTHYROXINE SODIUM 50 UG/1
TABLET ORAL
COMMUNITY

## 2025-01-09 RX ORDER — ACETAMINOPHEN 500 MG
TABLET ORAL
COMMUNITY

## 2025-01-09 NOTE — PROGRESS NOTES
[facility] Breast Surg  Breast Surgical Oncology  Follow-Up Patient Office Visit       Referring Provider: No ref. provider found   PCP: Katiuska Foster MD   Care Team:   Medical Oncologist: Dr. Scout Prasad  Radiation Oncologist: Dr. Lopez Prepreciousop    Chief Complaint:   Chief Complaint   Patient presents with    Follow-up     Patient reports no breast related concerns         Subjective:   Treatment History:  1. Left lumpectomy with left SLNB with bilateral breast reduction on 3/8/2021.  2. Taxol and Herceptin x's 12 weeks, completed 8/19/2021.  3. Maintenance Herceptin x's 1 year started 9/9/2021 - 5/19/2022  4. Adjuvant Radiation 9/2/2021 - 9/30/2021  5. 12/7/2022 Punch biopsy to persistent redness/skin thickening of left breast since radiation - benign findings consistent with thickened fibrotic dermis    Interval History:  1/9/2025 - Elina Paris returns today for follow up and clinical breast exam. She is doing well and has no breast concerns. She reports no changes in the redness to her left breast which as been present since the time of her radiation. She also still has palpable hardness in various areas of the left breast related to scarring from her breast reduction, most notable on her wise pattern scars. She is UTD on screening. Most recent exam recalled for further evaluation of an oval mass, which turned out to be compatible with fat necrosis.    HPI:  Elina Paris is a pleasant 70 Years old Female who presents with AJCC 8th ed clinical anatomic stage IA / prognostic stage IA (cT1c cN0 M0) multifocal Left invasive ductal carcinoma grade 1 at 3 o'clock 8 cm FN ER 99.74%, SD 98.57%, HER2 2 - equivocal on IHC/FISH positive and left invasive ductal carcinoma, grade 2 at 3 o'clock 3 cm FN ER 98.93%, SD 0.0%, HER2 2- equivocal on IHC/FISH positive. She is SP Left Lumpectomy with SLNB on 3/8/2021. Final pathology revealed left breast invasive ductal carcinoma  , grade 1 with one foci measuring 1.8 cm and another foci measuring 2.0 cm with associated foci of DCIS. All margins were clear. Two sentinel lymph nodes negative for metastatic carcinoma. The patient subsequently had a bilateral breast reduction. Due to HER-2 being positive, we treated with Taxol and Herceptin weekly for 12 weeks, completed on 2021. She will then complete a years worth of Herceptin, started on 2021. Radiation started on 2021, finished on 2021.    She returned 10/2022 after being seen by radiation oncology with concerns regarding redness, pain, and intermittent swelling to the left breast, which she states has been present since the time of her surgery and when she finished radiation. The swelling and pain comes and goes. She denies swelling in the left arm. She had a L DG MG to evaluate breast pain concerns in about 1 month ago which were benign. There was no detrimental interval change in the mammographic appearance of the left breast and no sonographic evidence of a mass, fluid collection, or hyperemia to suggest inflammation. She was referred to PT/OT for treatment of left breast lymphedema. With 4 weeks of therapy, she achieved resolution of pain symptoms and reports that prior skin thickening, swelling, and hardness significantly improved. However, redness it is still present and she was recommended for biopsy (benign findings consistent with thickened fibrotic dermis). This would be related to changes from radiation and lymphedema of the breast.    Imagin. 2020 [facility] MG at [facility] - which revealed multiple morphologically oval/circumscribed masses throughout both breasts which have changed in size or resolved after review of prior images, in the left breast UOQ mid depth and UOQ anterior depth two focal asymmetries are seen, and a left breast asymmetry anterior and close to the skin. No other significant masses, calcifications, or other findings in either breast.  BI-RADS 0: additional imaging is recommended.    2. 1/4/21 LEFT DG MG / US LEFT BREAST LIMITED at [facility] - which revealed on L MG at 3 o'clock mid depth a 1.6 cm irregular mass, at 3 o'clock anterior depth 1.0 cm oval mass with obscured margins, and at 2 o'clock anterior depth a 0.6 cm irregular mass. On L US at 3 o'clock 8 cm FN a 1.6 cm x 1.2 cm x 1.2 cm irregular mass with angular.microlobulated margins/posterior shadowing, 3 o'clock 3 cm FN a 1.0 cm x 1.0 cm x 0.7 cm oval circumscribed mass with internal septation (consisted with complicated cyst), and 2 o'clock 5 cm FN a 0.6 cm x 0.5 cm x 0.7 cm avascular with peripheral hyperechogenicity and central hypoechogenicity and indistinct margins.    BI-RADS 4: suspicious and biopsy was recommended for 3 o'clock 8 cm FN mass and 2 o'clock 5 cm FN. US-guided aspiration of 3 o'clock 3 cm FN cyst    3. 2/25/2021 BL Breast MRI at [facility] - which revealed biopsy proven malignancy at 3 o'clock in the left breast spanning 5.7 cm without suspicious enhancement in either breast. BI-RADS 6: biopsy proven malignancy.    3. 11/8/2021 BL DG MG - BENIGN: no mammographic evidence of malignancy. Benign post surgical changes are present bilaterally.     4. 8/17/2022 L DG MG and L Breast US to evaluate left breast pain and for post lumpectomy protocol - BIRADS 2: BENIGN: Mammogram: On today's mammographic views, there are expected, benign post-therapy changes of the left breast related to prior lumpectomy, reduction mammoplasty, and radiation therapy.  No detrimental interval change in the mammographic appearance of the left breast.  No mammographic correlate for the left breast upper outer quadrant/axillary tail area of concern (pain). Ultrasound: Targeted in area of pain (11:00 axis 8 cm FN), revealing no suspicious finding.  At the patient's area of concern, there is normal fatty tissue and expected postsurgical changes.  No sonographic evidence of a mass, fluid collection, or  hyperemia to suggest inflammation.  No suspicious left axillary adenopathy.     5. 11/9/2022 BL DG MG - BIRADS 2: BENIGN: No suspicious masses, calcifications, or other signs of malignancy are identified.  Benign postlumpectomy and post reduction changes are noted in the left breast.  Benign post reduction changes are also noted in the right breast.  A subcutaneous infusion port overlies the right axilla.  No significant interval change compared to the prior examination.      6. 11/13/2023 BL DG MG at [facility] - BIRADS 2: No mammographic evidence of malignancy in either breast.  Benign post lumpectomy changes are present in the left breast and benign post reduction changes present bilaterally. These postsurgical changes include benign, evolving fat necrosis including rim and dystrophic calcifications.     7. 11/14/2024 BL SCR MG at [facility] - BIRADS 0: Oval mass measuring up to 6 mm in the 4:00 right breast, 5 cm from the nipple, needs further evaluation. No other significant masses, calcifications, or other findings are seen in either breast. Benign postlumpectomy changes, including benign dystrophic calcifications, are present in the left breast. Benign post reduction changes are present bilaterally.     8. 12/16/2024 R DG MG at [facility] - BIRADS 2: 1) No mammographic evidence of malignancy in the right breast.  No sonographic evidence of malignancy in the evaluated portion of the right breast.    2) Right breast 4:00 axis 4 cm FN 0.6 x 0.2 x 0.4 cm of fat necrosis correlates with the mass recalled from recent screening mammography and is benign.  BI-RADS 2: Benign.    Pathology:  1. Ultrasound-guided Core Biopsy 3 o'clock 8 cm FN - Invasive ductal carcinoma, grade 1  ER 99.74%  AZ 98.57%  HER2 SOFIA 2 + equivocal on IHC/FISH positive    2. Ultrasound-guided Core Biopsy 3 o'clock 3 cm FN - Invasive ductal carcinoma, grade 2  ER 98.93%  AZ 0.0%  HER2 SOFIA 2 + equivocal on IHC/FISH positive    3. Ultrasound-guided Core Biopsy 2  o'clock 5 cm FN - Fragments of benign breast tissue and adipose tissue  4. 3/8/2021 Left Lumpectomy with Left Garner Lymph Node Biopsy revealed left breast invasive ductal carcinoma , grade 1 with one foci measuring 1.8 cm and another foci measuring 2.0 cm with associated foci of DCIS. All margins were clear. Two sentinel lymph nodes negative for metastatic carcinoma. The skin appears to show thickened fibrotic dermis which extends approximately 5-6 mm from the epidermal surface. The deeper fibrous tissue envelopes deep eccrine glands.    3. Skin, left breast (punch biopsy) - Skin with prominent dermal fibrous tissue. No evidence of malignancy. No glandular breast tissue identified. Only sparse perivascular lymphocytes are identified. Clinicopathologic correlation recommended.    OB/GYN History:  Menarche Onset: 11  Menopause: Post  Hormonal birth control (duration): no  Pregnancies: none  Age at first pregnancy: n/a  Child births: 0  Breastfeeding duration: no   Hysterectomy: yes  Oophorectomy: ?  HRT: no    Other:  MG breast density: Category B (Scattered fibroglandular)  Prior thoracic RT: none prior to breast cancer dx  Genetic testing: none  Ashkenazi Restoration descent: No      Family History:  Family History   Problem Relation Name Age of Onset    Cancer Mother  68    Coronary artery disease Mother      Hypertension Mother      Hypertension Father      Coronary artery disease Father      Cancer Brother      Hypertension Brother          Patient History:  Past Medical History:   Diagnosis Date    Anxiety     Breast cancer     CAD (coronary artery disease)     COPD (chronic obstructive pulmonary disease)     GERD (gastroesophageal reflux disease)     HLD (hyperlipidemia)     Hypertension     Hypothyroidism        Past Surgical History:   Procedure Laterality Date    BREAST BIOPSY  12/2022    CHOLECYSTECTOMY      COLONOSCOPY  07/06/2016    HYSTERECTOMY      MASTECTOMY, PARTIAL  03/08/2021    MEDIPORT INSERTION,  SINGLE  08/2021    REDUCTION OF BOTH BREASTS Bilateral 03/08/2021    TONSILLECTOMY      TOTAL KNEE ARTHROPLASTY Bilateral        Social History     Socioeconomic History    Marital status:    Tobacco Use    Smoking status: Former     Types: Cigarettes    Smokeless tobacco: Never   Substance and Sexual Activity    Alcohol use: Never    Drug use: Never       Immunization History   Administered Date(s) Administered    COVID-19, MRNA, LN-S, PF (Pfizer) (Purple Cap) 01/13/2021, 02/03/2021       Medications/Allergies:  Current Outpatient Medications on File Prior to Visit   Medication Sig Dispense Refill    acetaminophen (TYLENOL) 500 MG tablet Take 500 mg by mouth every 6 (six) hours as needed.      albuterol (PROVENTIL/VENTOLIN HFA) 90 mcg/actuation inhaler INHALE 2 PUFFS INTO THE LUNGS EVERY 6 HOURS AS NEEDED FOR WHEEZE      amLODIPine (NORVASC) 5 MG tablet 0      anastrozole (ARIMIDEX) 1 mg Tab TAKE 1 TABLET BY MOUTH EVERY DAY 90 tablet 2    aspirin (ECOTRIN) 81 MG EC tablet Take 81 mg by mouth.      atorvastatin (LIPITOR) 40 MG tablet Take 40 mg by mouth once daily.      benzonatate (TESSALON) 100 MG capsule TAKE 1 CAPSULE (100 MG) BY ORAL ROUTE 3 TIMES PER DAY AS NEEDED FOR COUGH      cefdinir (OMNICEF) 300 MG capsule Take 300 mg by mouth 2 (two) times daily.      diltiaZEM HCl (TIAZAC) 300 mg 24 hr capsule Take 1 capsule by mouth once daily.      doxycycline monohydrate 100 mg Tab Take 1 tablet by mouth 2 (two) times daily.      esomeprazole (NEXIUM) 40 MG capsule Take 1 capsule by mouth every morning.      fluconazole (DIFLUCAN) 150 MG Tab Take 150 mg by mouth every other day.      HYDROcodone-acetaminophen (NORCO)  mg per tablet Take 1 tablet by mouth as needed (As needed).      latanoprost 0.005 % ophthalmic solution Apply 1 drop to eye.      linaCLOtide (LINZESS) 290 mcg Cap capsule Take 290 mcg by mouth.      lisinopriL (PRINIVIL,ZESTRIL) 20 MG tablet Take 1 tablet by mouth every evening.       losartan (COZAAR) 100 MG tablet Take 1 tablet by mouth every morning.      meloxicam (MOBIC) 15 MG tablet Take 15 mg by mouth.      nitroGLYCERIN (NITROSTAT) 0.4 MG SL tablet Place 0.4 mg under the tongue.      nystatin (MYCOSTATIN) cream SMARTSIG:sparingly Topical Twice Daily PRN      omeprazole (PRILOSEC) 40 MG capsule Take 40 mg by mouth.      ondansetron (ZOFRAN) 4 MG tablet Take 1 tablet by mouth as needed.      ondansetron (ZOFRAN-ODT) 4 MG TbDL Take 4 mg by mouth every 8 (eight) hours as needed.      traMADoL (ULTRAM) 50 mg tablet TAKE 1 TABLET BY MOUTH EVERY 12 HOURS AS NEEDED FOR PAIN      traZODone (DESYREL) 50 MG tablet Take 50 mg by mouth every evening.      TRELEGY ELLIPTA 100-62.5-25 mcg DsDv TAKE 1 PUFF BY MOUTH EVERY DAY      triamterene-hydrochlorothiazide 37.5-25 mg (MAXZIDE-25) 37.5-25 mg per tablet Take 1 tablet by mouth once daily.      [DISCONTINUED] levothyroxine (SYNTHROID) 125 MCG tablet Take 125 mcg by mouth.      calcium carbonate-vitamin D3 (CALTRATE WITH VITAMIN D3) 600 mg-20 mcg (800 unit) Tab 1 tablet with a meal Orally Once a day      levothyroxine (SYNTHROID) 50 MCG tablet 30       No current facility-administered medications on file prior to visit.       Review of patient's allergies indicates:  No Known Allergies    Review of Systems:  Pertinent items are noted in HPI.     Objective:     Vitals:  Vitals:    01/09/25 1304   BP: (!) 140/84   Pulse: 75   Resp: 18   Temp: 98 °F (36.7 °C)           Body mass index is 36.76 kg/m².     Physical Exam:  General: The patient is awake, alert and oriented times three. The patient is well nourished and in no acute distress.  Neck: There is no evidence of palpable cervical, supraclavicular or axillary adenopathy. The neck is supple. The thyroid is not enlarged.  Musculoskeletal: The patient has a normal range of motion of her bilateral upper extremities.  Chest: Examination of the chest wall fails to reveal any obvious abnormalities. Nonlabored  breathing, symmetric expansion.  Breast:  Right: There are well healed incisions from her breast reduction. There is mild diffuse tenderness to the breast. Examination of right breast fails to reveal any dominant masses or areas of significant focal nodularity. The nipple is everted without evidence of discharge. There is no skin dimpling with movement of the pectoralis. There are no significant skin changes overlying the breast.   Left: There are well healed incisions from her breast reduction. There is diffuse hyperpigmentation as well as residual redness to the areola and surrounding skin (see image). This was previously reported on physical exam 5/2022 and reportedly present since radiation. Redness is well demarcated. Hardness noted to breast reduction scars. Otherwise, examination of the left breast fails to reveal any dominant masses or areas of significant focal nodularity. The nipple is everted without evidence of discharge. There is no skin dimpling with movement of the pectoralis. There are no significant skin changes overlying the breast.  Abdomen: The abdomen is soft, flat, nontender and nondistended.  Integumentary: no rashes or skin lesions present  Neurologic: cranial nerves intact, no signs of peripheral neurological deficit, motor/sensory function intact      Image from 10/5/2022. Relatively no change seen on today's exam.      Assessment and Plan:       Elina was seen today for follow-up.    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 Continue OT/PT for lymphedema management as needed.    2. Punch biopsy of area of concern was  benign and consistent with radiation and lymphedema changes. Physical exam findings are stable today. No suspicious findings on recent follow up MG. Will continue to monitor. She was advised to call if symptoms worsen or if she notices any other changes/symptoms to the breasts.    3. In the absence of significant clinical findings in the interval, a routine screening mammogram in 1 year is recommended.     4. Healthy lifestyle guidelines were reviewed. She was encouraged to engage in regular exercise, maintain a healthy body weight, and avoid excessive alcohol consumption. Healthy nutritional guidelines were also discussed. Self-breast examination was reviewed with the patient in detail and she was encouraged to perform this on a monthly basis.    5. RTC in 1 year.      All of her questions were answered.     Nadeen Philip PA-C        Total time on the date of the visit ranged from 30-39 mins (72902). Total time includes both face-to-face and non-face-to-face time personally spent by myself on the day of the visit.    Non-face-to-face time included:  _X_ preparing to see the patient such as reviewing the patient record  __ obtaining and reviewing separately obtained history  _X_ independently interpreting results  _X_ documenting clinical information in electronic health record.    Face-to-face time included:  _X_ performing an appropriate history and examination  _X_ communicating results to the patient  _X_ counseling and educating the patient  __ ordering appropriate medications  _x_ ordering appropriate tests  _X_ ordering appropriate procedures (including follow-up)  _X_ answering any questions the patient had    Total Time spent on date of visit: 32 minutes               Total time on the date of the visit ranged from 30-39 mins ([id number]). Total time includes both face-to-face and non-face-to-face time personally spent by myself on the day of the visit.    Non-face-to-face time included:  _X_ preparing to  see the patient such as reviewing the patient record  __ obtaining and reviewing separately obtained history  _X_ independently interpreting results  _X_ documenting clinical information in electronic health record.    Face-to-face time included:  _X_ performing an appropriate history and examination  _X_ communicating results to the patient  _X_ counseling and educating the patient  __ ordering appropriate medications  _x_ ordering appropriate tests  _X_ ordering appropriate procedures (including follow-up)  _X_ answering any questions the patient had    Total Time spent on date of visit: 35 minutes

## 2025-02-20 ENCOUNTER — LAB VISIT (OUTPATIENT)
Dept: LAB | Facility: HOSPITAL | Age: 75
End: 2025-02-20
Attending: NURSE PRACTITIONER
Payer: MEDICARE

## 2025-02-20 ENCOUNTER — OFFICE VISIT (OUTPATIENT)
Dept: HEMATOLOGY/ONCOLOGY | Facility: CLINIC | Age: 75
End: 2025-02-20
Payer: MEDICARE

## 2025-02-20 VITALS
RESPIRATION RATE: 18 BRPM | DIASTOLIC BLOOD PRESSURE: 73 MMHG | HEIGHT: 62 IN | WEIGHT: 205 LBS | HEART RATE: 92 BPM | BODY MASS INDEX: 37.73 KG/M2 | OXYGEN SATURATION: 100 % | SYSTOLIC BLOOD PRESSURE: 145 MMHG

## 2025-02-20 DIAGNOSIS — Z17.0 MALIGNANT NEOPLASM OF LOWER-OUTER QUADRANT OF LEFT BREAST OF FEMALE, ESTROGEN RECEPTOR POSITIVE: ICD-10-CM

## 2025-02-20 DIAGNOSIS — Z17.0 MALIGNANT NEOPLASM OF LOWER-OUTER QUADRANT OF LEFT BREAST OF FEMALE, ESTROGEN RECEPTOR POSITIVE: Primary | ICD-10-CM

## 2025-02-20 DIAGNOSIS — Z78.0 POSTMENOPAUSAL: ICD-10-CM

## 2025-02-20 DIAGNOSIS — Z79.811 LONG TERM (CURRENT) USE OF AROMATASE INHIBITORS: ICD-10-CM

## 2025-02-20 DIAGNOSIS — C50.512 MALIGNANT NEOPLASM OF LOWER-OUTER QUADRANT OF LEFT BREAST OF FEMALE, ESTROGEN RECEPTOR POSITIVE: Primary | ICD-10-CM

## 2025-02-20 DIAGNOSIS — M85.80 OSTEOPENIA, UNSPECIFIED LOCATION: ICD-10-CM

## 2025-02-20 DIAGNOSIS — C50.512 MALIGNANT NEOPLASM OF LOWER-OUTER QUADRANT OF LEFT BREAST OF FEMALE, ESTROGEN RECEPTOR POSITIVE: ICD-10-CM

## 2025-02-20 LAB
ALBUMIN SERPL-MCNC: 3.6 G/DL (ref 3.4–4.8)
ALBUMIN/GLOB SERPL: 1.1 RATIO (ref 1.1–2)
ALP SERPL-CCNC: 87 UNIT/L (ref 40–150)
ALT SERPL-CCNC: 10 UNIT/L (ref 0–55)
ANION GAP SERPL CALC-SCNC: 8 MEQ/L
AST SERPL-CCNC: 15 UNIT/L (ref 5–34)
BASOPHILS # BLD AUTO: 0.02 X10(3)/MCL
BASOPHILS NFR BLD AUTO: 0.3 %
BILIRUB SERPL-MCNC: 0.6 MG/DL
BUN SERPL-MCNC: 32.3 MG/DL (ref 9.8–20.1)
CALCIUM SERPL-MCNC: 9.9 MG/DL (ref 8.4–10.2)
CEA SERPL-MCNC: <1.73 NG/ML (ref 0–3)
CHLORIDE SERPL-SCNC: 108 MMOL/L (ref 98–107)
CO2 SERPL-SCNC: 26 MMOL/L (ref 23–31)
CREAT SERPL-MCNC: 1.64 MG/DL (ref 0.55–1.02)
CREAT/UREA NIT SERPL: 20
EOSINOPHIL # BLD AUTO: 0.24 X10(3)/MCL (ref 0–0.9)
EOSINOPHIL NFR BLD AUTO: 3.5 %
ERYTHROCYTE [DISTWIDTH] IN BLOOD BY AUTOMATED COUNT: 14 % (ref 11.5–17)
GFR SERPLBLD CREATININE-BSD FMLA CKD-EPI: 33 ML/MIN/1.73/M2
GLOBULIN SER-MCNC: 3.4 GM/DL (ref 2.4–3.5)
GLUCOSE SERPL-MCNC: 107 MG/DL (ref 82–115)
HCT VFR BLD AUTO: 37.7 % (ref 37–47)
HGB BLD-MCNC: 12.2 G/DL (ref 12–16)
IMM GRANULOCYTES # BLD AUTO: 0.01 X10(3)/MCL (ref 0–0.04)
IMM GRANULOCYTES NFR BLD AUTO: 0.1 %
LYMPHOCYTES # BLD AUTO: 1.71 X10(3)/MCL (ref 0.6–4.6)
LYMPHOCYTES NFR BLD AUTO: 24.6 %
MCH RBC QN AUTO: 28.6 PG (ref 27–31)
MCHC RBC AUTO-ENTMCNC: 32.4 G/DL (ref 33–36)
MCV RBC AUTO: 88.3 FL (ref 80–94)
MONOCYTES # BLD AUTO: 0.61 X10(3)/MCL (ref 0.1–1.3)
MONOCYTES NFR BLD AUTO: 8.8 %
NEUTROPHILS # BLD AUTO: 4.35 X10(3)/MCL (ref 2.1–9.2)
NEUTROPHILS NFR BLD AUTO: 62.7 %
PLATELET # BLD AUTO: 225 X10(3)/MCL (ref 130–400)
PMV BLD AUTO: 10.1 FL (ref 7.4–10.4)
POTASSIUM SERPL-SCNC: 4.8 MMOL/L (ref 3.5–5.1)
PROT SERPL-MCNC: 7 GM/DL (ref 5.8–7.6)
RBC # BLD AUTO: 4.27 X10(6)/MCL (ref 4.2–5.4)
SODIUM SERPL-SCNC: 142 MMOL/L (ref 136–145)
WBC # BLD AUTO: 6.94 X10(3)/MCL (ref 4.5–11.5)

## 2025-02-20 PROCEDURE — 36415 COLL VENOUS BLD VENIPUNCTURE: CPT

## 2025-02-20 PROCEDURE — 3078F DIAST BP <80 MM HG: CPT | Mod: CPTII,S$GLB,, | Performed by: NURSE PRACTITIONER

## 2025-02-20 PROCEDURE — 1101F PT FALLS ASSESS-DOCD LE1/YR: CPT | Mod: CPTII,S$GLB,, | Performed by: NURSE PRACTITIONER

## 2025-02-20 PROCEDURE — 80053 COMPREHEN METABOLIC PANEL: CPT

## 2025-02-20 PROCEDURE — 1159F MED LIST DOCD IN RCRD: CPT | Mod: CPTII,S$GLB,, | Performed by: NURSE PRACTITIONER

## 2025-02-20 PROCEDURE — 3077F SYST BP >= 140 MM HG: CPT | Mod: CPTII,S$GLB,, | Performed by: NURSE PRACTITIONER

## 2025-02-20 PROCEDURE — G2211 COMPLEX E/M VISIT ADD ON: HCPCS | Mod: S$GLB,,, | Performed by: NURSE PRACTITIONER

## 2025-02-20 PROCEDURE — 3008F BODY MASS INDEX DOCD: CPT | Mod: CPTII,S$GLB,, | Performed by: NURSE PRACTITIONER

## 2025-02-20 PROCEDURE — 85025 COMPLETE CBC W/AUTO DIFF WBC: CPT

## 2025-02-20 PROCEDURE — 86300 IMMUNOASSAY TUMOR CA 15-3: CPT

## 2025-02-20 PROCEDURE — 82378 CARCINOEMBRYONIC ANTIGEN: CPT

## 2025-02-20 PROCEDURE — 3288F FALL RISK ASSESSMENT DOCD: CPT | Mod: CPTII,S$GLB,, | Performed by: NURSE PRACTITIONER

## 2025-02-20 PROCEDURE — 99999 PR PBB SHADOW E&M-EST. PATIENT-LVL V: CPT | Mod: PBBFAC,,, | Performed by: NURSE PRACTITIONER

## 2025-02-20 PROCEDURE — 99215 OFFICE O/P EST HI 40 MIN: CPT | Mod: S$GLB,,, | Performed by: NURSE PRACTITIONER

## 2025-02-20 PROCEDURE — 4010F ACE/ARB THERAPY RXD/TAKEN: CPT | Mod: CPTII,S$GLB,, | Performed by: NURSE PRACTITIONER

## 2025-02-20 PROCEDURE — 1160F RVW MEDS BY RX/DR IN RCRD: CPT | Mod: CPTII,S$GLB,, | Performed by: NURSE PRACTITIONER

## 2025-02-20 RX ORDER — LEVOTHYROXINE SODIUM 112 UG/1
112 TABLET ORAL
COMMUNITY
Start: 2025-02-05

## 2025-02-20 NOTE — PROGRESS NOTES
Subjective:       Patient ID: Elina Paris is a 74 y.o. female.    Chief Complaint: Follow-up (Patient has no concerns today)        Diagnosis:  pT1c, N0, M0 stage IA triple positive invasive ductal carcinoma of the left breast.    Current Treatment:    1. Adjuvant endocrine therapy started October 2021- anastrozole      Treatment History:  Weekly taxol and herceptin for 12 weeks followed by a year of maintenance herceptin.  Week 1 given on 5/13/2021, week 12 given on 8/19/2021.  Maintenance Herceptin started on 9/9/2021 and completed 5/19/2022  Adjuvant radiation therapy to start on 9/2/2021, completed 9/30/2021.    Follow-up  Pertinent negatives include no abdominal pain, arthralgias, chest pain, chills, congestion, coughing, diaphoresis, fatigue, fever, headaches, numbness, rash, sore throat or weakness.   Breast Cancer  Pertinent negatives include no abdominal pain, arthralgias, chest pain, chills, congestion, coughing, diaphoresis, fatigue, fever, headaches, numbness, rash, sore throat or weakness.        HPI:  Patient who underwent a screening mammogram on 12/9/2020 that showed multiple morphologically oval/circumscribed masses throughout both breasts.  This was read as BI-RADS 0, additional imaging recommended.  Diagnostic mammogram and ultrasound of the left breast on 1/4/2021 was performed and showed a 1.6 cm irregular mass at the 3 o'clock position, a 1.0 cm oval mass with obscured margins at the 3 o'clock position and a 0.6 cm irregular mass at the 2 o'clock position.  On ultrasound, the 3:00 lesion 8 cm from the nipple measured 1.6 x 1.2 x 1.2 cm.  This was irregular with angular/microlobulated margins.  There was also posterior shadowing.  The 3 o'clock position 3 cm from the nipple showed a 1.0 x 1.0 x 0.7 cm oval circumscribed mass with internal septation.  The 2 o'clock position lesion 5 cm from the nipple measured 0.6 x 0.5 x 0.7 cm, was avascular with peripheral hyper echogenicity and  central hypoechogenicity and indistinct margins.  BI-RADS 4, biopsy recommended.  Patient underwent biopsy on 1/14/2021, the 3 o'clock position lesion 8 cm from the nipple revealed invasive ductal carcinoma.  The left breast 3 o'clock position 3 cm from the nipple returned as invasive ductal carcinoma.  The left breast lesion at the 2 o'clock position showed benign breast tissue.  Estrogen receptor was 99.74% positive, progesterone preceptor was 98.57% positive, HER-2 was 2+ by IHC and positive by FISH.  Patient then underwent bilateral breast MRI on 2/25/2021 which revealed biopsy-proven malignancy at the 3 o'clock position in the left breast spanning 5.7 cm without suspicious enhancement in either breast.  Patient then underwent a left sided lumpectomy on 3/8/2021, final pathology revealed a pT1c, N0, M0 stage IA triple positive invasive ductal carcinoma.  The patient subsequently had a bilateral breast reduction.  This was done by Dr. Manuel Mclean.  She was referred to medical oncology.  I saw the patient on 3/31/2021.  Baseline DEXA scan completed in 4/8/2021 showed mild osteopenia in the left and right femoral necks.  Echocardiogram completed on 4/14/2021 showed EF of 55-60%.  Taxol and herceptin started on 5/13/2021, week 12 given on 8/19/2021.  Repeat ECHO on 8/31/2021 showed EF of 55-60%.  Radiation started on 9/2/2021.  Started maintenance Herceptin on 9/9/2021.  Completed radiation on 9/30/2021.  Started adjuvant endocrine therapy in October 2021.  Repeat ECHOs have been done on a regular basis and showed no true decrease in ejection fraction, most recently on 2/25/2022 with ejection fraction of 55%.        Interval History:  Patient presents to clinic for scheduled follow up appointment for breast cancer. She remains on anastrozole and continues to tolerate it well.  Since her last visit she did get scans of her arm and was seen by Orthopedics and completed some PT.  She denies any other new pain,  bowel changes or breast complaints.      Past Medical History:   Diagnosis Date    Anxiety     Breast cancer     CAD (coronary artery disease)     COPD (chronic obstructive pulmonary disease)     GERD (gastroesophageal reflux disease)     HLD (hyperlipidemia)     Hypertension     Hypothyroidism       Past Surgical History:   Procedure Laterality Date    BREAST BIOPSY  12/2022    CHOLECYSTECTOMY      COLONOSCOPY  07/06/2016    HYSTERECTOMY      MASTECTOMY, PARTIAL  03/08/2021    MEDIPORT INSERTION, SINGLE  08/2021    REDUCTION OF BOTH BREASTS Bilateral 03/08/2021    TONSILLECTOMY      TOTAL KNEE ARTHROPLASTY Bilateral      Social History     Socioeconomic History    Marital status:    Tobacco Use    Smoking status: Former     Types: Cigarettes    Smokeless tobacco: Never   Substance and Sexual Activity    Alcohol use: Never    Drug use: Never      Family History   Problem Relation Name Age of Onset    Cancer Mother  68    Coronary artery disease Mother      Hypertension Mother      Hypertension Father      Coronary artery disease Father      Cancer Brother      Hypertension Brother        Review of patient's allergies indicates:  No Known Allergies   Review of Systems   Constitutional:  Negative for appetite change, chills, diaphoresis, fatigue, fever and unexpected weight change.   HENT:  Negative for nasal congestion, mouth sores, sinus pressure/congestion and sore throat.    Eyes:  Negative for pain and visual disturbance.   Respiratory:  Negative for cough, chest tightness and shortness of breath.    Cardiovascular:  Negative for chest pain, palpitations and leg swelling.   Gastrointestinal:  Negative for abdominal distention, abdominal pain, blood in stool, constipation and diarrhea.   Genitourinary:  Negative for dysuria, frequency and hematuria.   Musculoskeletal:  Negative for arthralgias and back pain.   Integumentary:  Negative for rash.   Neurological:  Negative for dizziness, weakness, numbness and  headaches.   Hematological:  Negative for adenopathy.   Psychiatric/Behavioral:  Negative for confusion. The patient is not nervous/anxious.          Objective:      Physical Exam  Vitals reviewed.   Constitutional:       General: She is not in acute distress.     Appearance: Normal appearance.   HENT:      Head: Normocephalic and atraumatic.      Nose: Nose normal.      Mouth/Throat:      Mouth: Mucous membranes are moist.   Eyes:      Extraocular Movements: Extraocular movements intact.      Conjunctiva/sclera: Conjunctivae normal.   Cardiovascular:      Rate and Rhythm: Normal rate and regular rhythm.      Pulses: Normal pulses.      Heart sounds: Normal heart sounds.   Pulmonary:      Effort: Pulmonary effort is normal.      Breath sounds: Normal breath sounds.   Chest:      Comments: Breast exam deferred today.  Abdominal:      General: Bowel sounds are normal.      Palpations: Abdomen is soft.   Musculoskeletal:         General: No swelling. Normal range of motion.      Cervical back: Normal range of motion and neck supple.      Right lower leg: No edema.      Left lower leg: No edema.   Lymphadenopathy:      Cervical: No cervical adenopathy.      Upper Body:      Right upper body: No supraclavicular or axillary adenopathy.      Left upper body: No supraclavicular or axillary adenopathy.   Skin:     General: Skin is warm and dry.   Neurological:      General: No focal deficit present.      Mental Status: She is alert and oriented to person, place, and time. Mental status is at baseline.   Psychiatric:         Mood and Affect: Mood normal.         Behavior: Behavior normal.       LABS AND IMAGING REVIEWED IN EPIC        Assessment:     1.  pT1c, N0, M0 stage IA triple positive invasive ductal carcinoma of the left breast.        Plan:         Treated with Taxol and Herceptin weekly for 12 weeks, completed on 8/19/2021.  Radiation started on 9/2/2021, finished on 9/30/2021.       Continue endocrine therapy as  prescribed, started October 2021. Completed maintenance Herceptin on 5/19/2022.    Echocardiograms done every 3 months during treatment with Herceptin, most recently on 6/8/2022 with ejection fraction of 58%.    Baseline DEXA scan completed in 4/8/2021 showed mild osteopenia of the left and right femoral neck. Repeat done 4/3/2023 with continued osteopenia.  We will continue to monitor.  She will continue calcium for bone health.  She does not like shots and would like to continue calcium and vitamin D and see if her osteopenia improves. If repeat DEXA in 04/2024 shows continued osteopenia, she stated that she would agree to prolia.  We will need to get this scheduled    Started maintenance Herceptin on 9/9/2021 and completed 1 year worth in May 2022.          Continue anastrozole    Referred to renal for decreased kidney function    Bilateral screening Mammogram on 11/14/202- BIRADS 0.  Right diagnostic mammo and US on 12/16/2024- BI-RADS 2, benign.    CBC, CMP, CEA, CA 15-3 prior to follow up    Return to clinic in 6 months     EDI Elaine      Visit today included increased complexity associated with the care of the episodic problem breast cancer on endocrine therapy, addressing and managing the longitudinal care of the patient's breast cancer.

## 2025-02-21 LAB — CANCER AG15-3 SERPL IA-ACNC: 15 U/ML

## 2025-04-07 ENCOUNTER — HOSPITAL ENCOUNTER (OUTPATIENT)
Dept: RADIOLOGY | Facility: HOSPITAL | Age: 75
Discharge: HOME | End: 2025-04-07
Attending: INTERNAL MEDICINE
Payer: MEDICARE

## 2025-04-07 DIAGNOSIS — C50.919 MALIGNANT NEOPLASM OF FEMALE BREAST, UNSPECIFIED ESTROGEN RECEPTOR STATUS, UNSPECIFIED LATERALITY, UNSPECIFIED SITE OF BREAST: ICD-10-CM

## 2025-04-07 DIAGNOSIS — Z78.0 POST-MENOPAUSE: ICD-10-CM

## 2025-04-07 DIAGNOSIS — M85.80 OSTEOPENIA, UNSPECIFIED LOCATION: ICD-10-CM

## 2025-04-07 PROCEDURE — 77080 DXA BONE DENSITY AXIAL: CPT | Mod: TC

## 2025-05-04 DIAGNOSIS — C50.912 MALIGNANT NEOPLASM OF LEFT BREAST IN FEMALE, ESTROGEN RECEPTOR POSITIVE, UNSPECIFIED SITE OF BREAST: ICD-10-CM

## 2025-05-04 DIAGNOSIS — Z17.0 MALIGNANT NEOPLASM OF LEFT BREAST IN FEMALE, ESTROGEN RECEPTOR POSITIVE, UNSPECIFIED SITE OF BREAST: ICD-10-CM

## 2025-05-05 RX ORDER — ANASTROZOLE 1 MG/1
1 TABLET ORAL
Qty: 90 TABLET | Refills: 1 | Status: SHIPPED | OUTPATIENT
Start: 2025-05-05

## 2025-06-19 DIAGNOSIS — N18.32 CKD STAGE 3B, GFR 30-44 ML/MIN: Primary | ICD-10-CM

## 2025-06-19 DIAGNOSIS — N17.9 ACUTE KIDNEY FAILURE, UNSPECIFIED: ICD-10-CM

## 2025-06-19 DIAGNOSIS — E11.22 TYPE 2 DIABETES MELLITUS WITH DIABETIC CHRONIC KIDNEY DISEASE, UNSPECIFIED CKD STAGE, UNSPECIFIED WHETHER LONG TERM INSULIN USE: ICD-10-CM

## 2025-07-22 DIAGNOSIS — N18.32 CKD STAGE 3B, GFR 30-44 ML/MIN: Primary | ICD-10-CM

## 2025-07-22 DIAGNOSIS — E11.22 TYPE 2 DIABETES MELLITUS WITH DIABETIC CHRONIC KIDNEY DISEASE, UNSPECIFIED CKD STAGE, UNSPECIFIED WHETHER LONG TERM INSULIN USE: ICD-10-CM

## 2025-07-28 ENCOUNTER — LAB VISIT (OUTPATIENT)
Dept: LAB | Facility: HOSPITAL | Age: 75
End: 2025-07-28
Attending: INTERNAL MEDICINE
Payer: MEDICARE

## 2025-07-28 DIAGNOSIS — E11.22 TYPE 2 DIABETES MELLITUS WITH DIABETIC CHRONIC KIDNEY DISEASE, UNSPECIFIED CKD STAGE, UNSPECIFIED WHETHER LONG TERM INSULIN USE: ICD-10-CM

## 2025-07-28 DIAGNOSIS — N18.32 CKD STAGE 3B, GFR 30-44 ML/MIN: ICD-10-CM

## 2025-07-28 LAB
BACTERIA #/AREA URNS AUTO: ABNORMAL /HPF
BILIRUB UR QL STRIP.AUTO: NEGATIVE
CLARITY UR: CLEAR
COLOR UR AUTO: YELLOW
CREAT UR-MCNC: 140.4 MG/DL (ref 45–106)
GLUCOSE UR QL STRIP: NORMAL
HGB UR QL STRIP: NEGATIVE
KETONES UR QL STRIP: NEGATIVE
LEUKOCYTE ESTERASE UR QL STRIP: 25
MUCOUS THREADS URNS QL MICRO: ABNORMAL /LPF
NITRITE UR QL STRIP: NEGATIVE
PH UR STRIP: 7 [PH]
PROT UR QL STRIP: ABNORMAL
PROT UR STRIP-MCNC: 28.3 MG/DL
PTH-INTACT SERPL-MCNC: 39.4 PG/ML (ref 8.7–77)
RBC #/AREA URNS AUTO: ABNORMAL /HPF
SP GR UR STRIP.AUTO: 1.02 (ref 1–1.03)
SQUAMOUS #/AREA URNS LPF: ABNORMAL /HPF
URINE PROTEIN/CREATININE RATIO (OLG): 0.2
UROBILINOGEN UR STRIP-ACNC: NORMAL
WBC #/AREA URNS AUTO: ABNORMAL /HPF

## 2025-07-28 PROCEDURE — 36415 COLL VENOUS BLD VENIPUNCTURE: CPT

## 2025-07-28 PROCEDURE — 83970 ASSAY OF PARATHORMONE: CPT

## 2025-07-28 PROCEDURE — 81001 URINALYSIS AUTO W/SCOPE: CPT

## 2025-07-28 PROCEDURE — 82570 ASSAY OF URINE CREATININE: CPT

## 2025-07-30 ENCOUNTER — OFFICE VISIT (OUTPATIENT)
Dept: NEPHROLOGY | Facility: CLINIC | Age: 75
End: 2025-07-30
Payer: MEDICARE

## 2025-07-30 VITALS
TEMPERATURE: 98 F | DIASTOLIC BLOOD PRESSURE: 82 MMHG | WEIGHT: 197.81 LBS | HEART RATE: 97 BPM | SYSTOLIC BLOOD PRESSURE: 145 MMHG | BODY MASS INDEX: 36.4 KG/M2 | RESPIRATION RATE: 19 BRPM | HEIGHT: 62 IN

## 2025-07-30 DIAGNOSIS — N17.9 AKI (ACUTE KIDNEY INJURY): ICD-10-CM

## 2025-07-30 DIAGNOSIS — E11.22 TYPE 2 DIABETES MELLITUS WITH DIABETIC CHRONIC KIDNEY DISEASE, UNSPECIFIED CKD STAGE, UNSPECIFIED WHETHER LONG TERM INSULIN USE: ICD-10-CM

## 2025-07-30 DIAGNOSIS — R60.0 BILATERAL LEG EDEMA: ICD-10-CM

## 2025-07-30 DIAGNOSIS — I10 PRIMARY HYPERTENSION: ICD-10-CM

## 2025-07-30 DIAGNOSIS — N18.31 STAGE 3A CHRONIC KIDNEY DISEASE: Primary | ICD-10-CM

## 2025-07-30 DIAGNOSIS — R53.83 FATIGUE, UNSPECIFIED TYPE: ICD-10-CM

## 2025-07-30 PROBLEM — N18.32 CKD STAGE 3B, GFR 30-44 ML/MIN: Status: ACTIVE | Noted: 2025-07-30

## 2025-07-30 PROCEDURE — 99205 OFFICE O/P NEW HI 60 MIN: CPT | Mod: S$GLB,,, | Performed by: INTERNAL MEDICINE

## 2025-07-30 PROCEDURE — 1159F MED LIST DOCD IN RCRD: CPT | Mod: CPTII,S$GLB,, | Performed by: INTERNAL MEDICINE

## 2025-07-30 PROCEDURE — 99999 PR PBB SHADOW E&M-EST. PATIENT-LVL V: CPT | Mod: PBBFAC,,, | Performed by: INTERNAL MEDICINE

## 2025-07-30 PROCEDURE — 1101F PT FALLS ASSESS-DOCD LE1/YR: CPT | Mod: CPTII,S$GLB,, | Performed by: INTERNAL MEDICINE

## 2025-07-30 PROCEDURE — 3079F DIAST BP 80-89 MM HG: CPT | Mod: CPTII,S$GLB,, | Performed by: INTERNAL MEDICINE

## 2025-07-30 PROCEDURE — 3077F SYST BP >= 140 MM HG: CPT | Mod: CPTII,S$GLB,, | Performed by: INTERNAL MEDICINE

## 2025-07-30 PROCEDURE — 1160F RVW MEDS BY RX/DR IN RCRD: CPT | Mod: CPTII,S$GLB,, | Performed by: INTERNAL MEDICINE

## 2025-07-30 PROCEDURE — 3288F FALL RISK ASSESSMENT DOCD: CPT | Mod: CPTII,S$GLB,, | Performed by: INTERNAL MEDICINE

## 2025-07-30 PROCEDURE — 1126F AMNT PAIN NOTED NONE PRSNT: CPT | Mod: CPTII,S$GLB,, | Performed by: INTERNAL MEDICINE

## 2025-07-30 PROCEDURE — 3066F NEPHROPATHY DOC TX: CPT | Mod: CPTII,S$GLB,, | Performed by: INTERNAL MEDICINE

## 2025-07-30 PROCEDURE — 4010F ACE/ARB THERAPY RXD/TAKEN: CPT | Mod: CPTII,S$GLB,, | Performed by: INTERNAL MEDICINE

## 2025-07-30 RX ORDER — METFORMIN HYDROCHLORIDE 500 MG/1
500 TABLET ORAL
COMMUNITY
Start: 2025-07-24

## 2025-07-30 RX ORDER — POTASSIUM CHLORIDE 1500 MG/1
20 TABLET, EXTENDED RELEASE ORAL DAILY
COMMUNITY
Start: 2025-07-24

## 2025-07-30 RX ORDER — FUROSEMIDE 20 MG/1
20 TABLET ORAL
COMMUNITY
Start: 2025-07-14

## 2025-07-30 RX ORDER — FLUTICASONE FUROATE, UMECLIDINIUM BROMIDE AND VILANTEROL TRIFENATATE 200; 62.5; 25 UG/1; UG/1; UG/1
1 POWDER RESPIRATORY (INHALATION) DAILY
COMMUNITY
Start: 2025-07-14

## 2025-07-30 NOTE — ASSESSMENT & PLAN NOTE
Systolic blood pressure way above goal but diastolics are within normal limits    She was just started on furosemide and Kcl last week   Continue current medication regimen as below for now:    Hypertension Medications              amLODIPine (NORVASC) 5 MG tablet 0    furosemide (LASIX) 20 MG tablet Take 20 mg by mouth as needed.    losartan (COZAAR) 100 MG tablet Take 1 tablet by mouth every morning.    nitroGLYCERIN (NITROSTAT) 0.4 MG SL tablet Place 0.4 mg under the tongue as needed for Chest pain.          Discussed low salt diet and adequate water intake/hydration  Proper BP measurement techniques demonstrated and discussed in detail today   Advised to measure BP at home using proper technique and to bring in BP log for review to next appointment  Advised to bring in all meds (non-refrigerated only) in a bag for review to next appointment    Goal BP <140/90 discussed with pt

## 2025-07-30 NOTE — PATIENT INSTRUCTIONS
"Increase water intake, let's aim for 5-6 bottles for now    Please bring in your blood pressure log to next office visit for review  Please bring in your blood pressure machine to next office visit to make sure it is accurate  Please bring in your non-refrigerated medications to next office visit for review    Blood pressure goal: consistently less than 140/90    AC1 goal for diabetics: less than 7%    Avoid NSAIDs and COX2 inhibitors: Advil (ibuprofen), Aleve (naproxen), Mobic (meloxicam), Celebrex (celecoxib), Toradol (ketorolac) and Diclofenac (voltaren), Indomethacin (indocin).    Only take tylenol (acetaminophen) occasionally if needed for aches/pains.    Stay well hydrated with water: goal is around 64 ounces per day of pure water (not tea, coffee, soda, etc...)    Recommend low sodium diet:  Less than 2,000 mg per day  Avoid high salt foods (olives, pickles, smoked meats, deli meats, chips, fast food, etc.)   Do not add salt to your food at the table  Use only small amounts of salt when cooking    Recommend a healthy lifestyle for weight management:  Light to moderate exercise, increasing as tolerated  Low carbohydrate, low fat diet  Portion control  Weight loss can help slow kidney disease progression     Avoid alcohol, soda, and energy drinks. Limit tea and coffee.     Avoid excessive intake of high potassium foods:  Bananas, oranges, melons, tomatoes, potatoes, avocados, or salt substitutes ("low sodium seasonings")    Call our office with concerns prior to next appointment.    ---------------------------------------------------------------------------------------------------------------------------------    CHRONIC KIDNEY DISEASE BASIC INFORMATION:    Your kidneys do many important jobs. Some of the ways they keep your whole body in balance include:  Removing natural waste products and extra water from your body  Helping make red blood cells  Balancing important minerals in your body  Helping maintain " your blood pressure  Keeping your bones healthy    Chronic kidney disease (CKD) is when the kidneys have become damaged over time (for at least 3 months) and have a hard time doing all their important jobs. CKD also increases the risk of other health problems like heart disease and stroke. Developing CKD is usually a very slow process with very few symptoms at first.    Risk Factors  Anyone can develop CKD - at any age. However, some people are at a higher risk than others. The most common CKD risk factors are:  Diabetes  High blood pressure (hypertension)  Heart disease and/or heart failure  Obesity  Over the age of 60  Family history of CKD or kidney failure  Personal history of acute kidney injury (TRUDI)  Smoking and/or use of tobacco products  Use of NSAIDs    For many people, CKD is not caused by just one reason. Instead, it is a result of many physical, environmental, and social factors.      For more education on kidney disease you can visit:  https://www.kidney.org/kidney-basics    https://www.kidney.org/kidney-topics/understanding-your-lab-values-and-other-ckd-health-numbers

## 2025-07-30 NOTE — PROGRESS NOTES
Nephrology Clinic    Patient ID: [id number]     Chief Complaint: Chronic Kidney Disease      HPI:     Elina Paris is a 75 y.o. female here today for nephrology initial evaluation.     Referred for evaluation of TRUDI and CKD.  Hospitalized in May 2025 for acute COPD exacerbation with metapneumovirus PNA during which she had an TRUDI event.  Creatinine on admission was 1.51 and on discharge was back to baseline, 1.03    Denies prior renal evaluation. No prior renal replacement therapy. Also denies recent episode of dehydration, diarrhea, vomiting, recent angiograms or exposure to IV radiocontrast. No NSAID use. No hx autoimmune disease, HTN urgency or emergency, MI, CVA, nephrolithiasis, gout. Denies foamy urine. Reports infrequent feeling of incomplete bladder emptying sensation without hematuria, dysuria or abd pain.  No hx recurrent UTIs.     BP at home 182/60-70's. Started on furosemide 20 mg for ankle edema last week. Also started on Metformin and Kcl in the past week. Reports swelling in the past few weeks. Also reports SOB chronically due to COPD but worsening KOENIG at home in the past few weeks. Sees Dr. Dee (cardio) but does not have an appointment coming up soon.     Pregnancy History:     No history of gestational diabetes, pre-eclampsia, or eclampsia  Patient was born full term without complications.    Dx BrCA in  with L lumpectomy followed by chemo and radiation. Sees Dr. Dane Prasad  Dx HTN >47 yrs ago (started after birth of her last baby)    Past Medical History:   Diagnosis Date    Anxiety     Breast cancer     CAD (coronary artery disease)     COPD (chronic obstructive pulmonary disease)     GERD (gastroesophageal reflux disease)     HLD (hyperlipidemia)     Hypertension     Hypothyroidism         Past Surgical History:   Procedure Laterality Date    BREAST BIOPSY  2022    CHOLECYSTECTOMY      COLONOSCOPY  2016    HYSTERECTOMY      MASTECTOMY, PARTIAL  2021     MEDIPORT INSERTION, SINGLE  08/2021    REDUCTION OF BOTH BREASTS Bilateral 03/08/2021    TONSILLECTOMY      TOTAL KNEE ARTHROPLASTY Bilateral         Social History     Tobacco Use    Smoking status: Former     Types: Cigarettes    Smokeless tobacco: Never   Substance and Sexual Activity    Alcohol use: Never    Drug use: Never    Sexual activity: Not on file        Family History   Problem Relation Name Age of Onset    Cancer Mother  68    Coronary artery disease Mother      Hypertension Mother      Hypertension Father      Coronary artery disease Father      Cancer Brother      Hypertension Brother     Brother with lung CA also had ESRD on HD, unclear cause      Current Outpatient Medications   Medication Instructions    acetaminophen (TYLENOL) 500 mg, Every 6 hours PRN    albuterol (PROVENTIL/VENTOLIN HFA) 90 mcg/actuation inhaler INHALE 2 PUFFS INTO THE LUNGS EVERY 6 HOURS AS NEEDED FOR WHEEZE    amLODIPine (NORVASC) 5 MG tablet 0    anastrozole (ARIMIDEX) 1 mg, Oral    aspirin (ECOTRIN) 81 mg    atorvastatin (LIPITOR) 40 mg, Daily    calcium carbonate-vitamin D3 (CALTRATE WITH VITAMIN D3) 600 mg-20 mcg (800 unit) Tab 1 tablet with a meal Orally Once a day    furosemide (LASIX) 20 mg, As needed (PRN)    latanoprost 0.005 % ophthalmic solution 1 drop    levothyroxine (SYNTHROID) 50 MCG tablet 30    levothyroxine (SYNTHROID) 112 mcg    linaCLOtide (LINZESS) 290 mcg    losartan (COZAAR) 100 MG tablet 1 tablet, Every morning    metFORMIN (GLUCOPHAGE) 500 mg, With breakfast    nitroGLYCERIN (NITROSTAT) 0.4 mg, As needed (PRN)    potassium chloride (K-TAB) 20 mEq 20 mEq, Daily    traZODone (DESYREL) 50 mg, Nightly    TRELEGY ELLIPTA 200-62.5-25 mcg inhaler 1 puff, Daily       Review of patient's allergies indicates:  No Known Allergies     Patient Care Team:  Katiuska Foster MD as PCP - General  SourMaral calderon DO as Consulting Physician (Nephrology)     Subjective:     ROS    Negative except as stated in the  "history of present illness. See HPI for details.    Objective:     Visit Vitals  BP (!) 145/82 (BP Location: Right arm, Patient Position: Sitting)   Pulse 97   Temp 98.3 °F (36.8 °C) (Oral)   Resp 19   Ht 5' 2.01" (1.575 m)   Wt 89.7 kg (197 lb 12.8 oz)   BMI 36.17 kg/m²       Physical Exam  General Appearance: Patient is in no acute distress. Awake. Alert.  Skin: No rashes or wounds.  HEENT: PERRLA, EOMI, no scleral icterus, no JVD. Neck is supple.  Chest: Respirations are unlabored. Lungs sounds are clear.   Heart: S1, S2. Regular rate and rhythm. (+) 3/6 systolic murmur  Abdomen: Soft, Non-tender, Non-distended.  : No CVA tenderness.   Extremities: peripheral pulses are palpable. (+) B/L 1+ pitting edema  Neuro: No focal deficits.     Laboratory/Imaging Reviewed:     Blood:  Lab Results   Component Value Date    WBC 6.94 02/20/2025    HGB 12.2 02/20/2025    HCT 37.7 02/20/2025     02/20/2025     05/28/2025    K 4.3 05/28/2025    CO2 21 (L) 05/28/2025    BUN 37 (H) 05/28/2025    CREATININE 1.03 (H) 05/28/2025    EGFRNORACEVR 33 02/20/2025    CALCIUM 8.6 (L) 05/28/2025    ALKPHOS 87 02/20/2025    LABPROT 12.3 05/14/2022    ALBUMIN 3.1 (L) 05/22/2025    BILIDIR 0.1 04/25/2022    IBILI 0.20 04/25/2022    AST 15 02/20/2025    ALT 10 02/20/2025      Lab Results   Component Value Date    PTH 39.4 07/28/2025    MLZUIQDI13RS 35.5 12/01/2021     Urine:  Lab Results   Component Value Date    APPEARANCEUA Clear 07/28/2025    SGUA 1.018 07/28/2025    PROTEINUA Trace (A) 07/28/2025    KETONESUA Negative 07/28/2025    LEUKOCYTESUR 25 (A) 07/28/2025    RBCUA 0-5 07/28/2025    WBCUA 0-5 07/28/2025    BACTERIA None Seen 07/28/2025    SQEPUA Occasional (A) 07/28/2025    CREATRANDUR 140.4 (H) 07/28/2025    PROTEINURINE 28.3 07/28/2025    UPROTCREA 0.2 07/28/2025      Imaging:  No results found for this or any previous visit from the past 1700 days.      All pertinent nephrology labwork and imaging independently " reviewed. Discussed these findings in detail with the patient.    Assessment:       ICD-10-CM ICD-9-CM   1. Stage 3a chronic kidney disease  N18.31 585.3   2. Primary hypertension  I10 401.9   3. Type 2 diabetes mellitus with diabetic chronic kidney disease, unspecified CKD stage, unspecified whether long term insulin use  E11.22 250.40     585.9   4. Bilateral leg edema  R60.0 782.3   5. TRUDI (acute kidney injury)  N17.9 584.9   6. Fatigue, unspecified type  R53.83 780.79        Plan:     1. Stage 3a chronic kidney disease  Assessment & Plan:  Baseline creatinine around 0.9-1.1, at baseline currently  Has 200 mg proteinuria now, albumin/creatinine 31 mcg/g    CKD likely 2/2 DM nephropathy + HTN nephrosclerosis + age-related renal function decline    Continue losartan 100 mg daily  She was just started on metformin, monitor A1c closely.  May benefit from SGLT2 inhibitor therapy if A1c remains above goal and she continues to have proteinuria.  Will continue to monitor    Sodium 147, advised to increase water intake  Avoidance of NSAIDs and other nephrotoxins discussed    RBUS completed May 2025 shows bilateral age-related cortical thinning and increased echogenicity.  There is an upper pole cortical cyst measuring 2.2 cm in the left kidney.  No further testing or follow-up needed at this time    Will repeat labs and workup CKD as below    Orders:  -     CBC Auto Differential; Future; Expected date: 08/11/2025  -     Comprehensive Metabolic Panel; Future; Expected date: 08/11/2025  -     Urinalysis; Future; Expected date: 08/11/2025  -     Protein/Creatinine Ratio, Urine; Future; Expected date: 08/11/2025  -     Magnesium; Future; Expected date: 08/11/2025  -     Phosphorus; Future; Expected date: 08/11/2025  -     Vitamin D; Future; Expected date: 08/11/2025  -     Protein electrophoresis, timed urine; Future; Expected date: 08/11/2025  -     Immunoglobulin Free LT Chains Blood; Future; Expected date: 08/11/2025  -      Hepatitis Panel, Acute; Future; Expected date: 08/11/2025  -     HIV 1/2 Ag/Ab (4th Gen); Future; Expected date: 08/11/2025  -     RPR w/Rflx Titer; Future; Expected date: 08/11/2025  -     Anti-Neutrophilic Cytoplasmic Antibody; Future; Expected date: 08/11/2025  -     C3 Complement; Future; Expected date: 08/11/2025  -     C4 Complement; Future; Expected date: 08/11/2025  -     Glomerular Basement Membrane Antibodies; Future; Expected date: 08/11/2025  -     Phospholipase A2 Receptor AB, Serum; Future; Expected date: 08/11/2025  -     THSD7A Antibody; Future; Expected date: 08/11/2025  -     Uric Acid; Future; Expected date: 08/11/2025    2. Primary hypertension  Assessment & Plan:  Systolic blood pressure way above goal but diastolics are within normal limits    She was just started on furosemide and Kcl last week   Continue current medication regimen as below for now:    Hypertension Medications              amLODIPine (NORVASC) 5 MG tablet 0    furosemide (LASIX) 20 MG tablet Take 20 mg by mouth as needed.    losartan (COZAAR) 100 MG tablet Take 1 tablet by mouth every morning.    nitroGLYCERIN (NITROSTAT) 0.4 MG SL tablet Place 0.4 mg under the tongue as needed for Chest pain.          Discussed low salt diet and adequate water intake/hydration  Proper BP measurement techniques demonstrated and discussed in detail today   Advised to measure BP at home using proper technique and to bring in BP log for review to next appointment  Advised to bring in all meds (non-refrigerated only) in a bag for review to next appointment    Goal BP <140/90 discussed with pt        3. Type 2 diabetes mellitus with diabetic chronic kidney disease, unspecified CKD stage, unspecified whether long term insulin use  Assessment & Plan:  A1c 6.9%  Just started on metformin recently  Low-carb/sugar diet discussed    Again, she may benefit from SGLT2 inhibitor therapy if A1c remains above goal and she continues to have proteinuria.  Will  continue to monitor closely    Orders:  -     Ambulatory referral/consult to Nephrology    4. Bilateral leg edema  Assessment & Plan:  Continue furosemide 20 mg daily   low-salt diet discussed  Compression stockings when awake  Leg elevation when seated  Follow up with cardio (?CYNDI testing)      5. TRUDI (acute kidney injury)  Assessment & Plan:  Has had several episodes of TRUDI in the past with her highest creatinine being 1.74 in April 2024 (unclear etiology)    Most recent episode of TRUDI has resolved      6. Fatigue, unspecified type  -     Hepatitis Panel, Acute; Future; Expected date: 08/11/2025      I spent 60 minutes in the care of this patient today    General renoprotective measures reviewed and discussed.  Avoid NSAIDs (Aleve, Mobic, Celebrex, Ibuprofen, Advil, Toradol and Diclofenac).  Only take tylenol occasionally if needed for aches/pains.    Educated on low sodium diet:  Avoid high salt foods (olives, pickles, smoked meats, deli meats, salted potato chips, etc.).   Do not add salt to your food at the table.   Use only small amounts of salt/cajun seasoning when cooking.      Follow up in about 1 month (around 8/30/2025) for Blood Pressure Check, With Labwork Prior to Visit. In addition to their scheduled follow up, the patient has also been instructed to follow up on as needed basis.

## 2025-07-30 NOTE — ASSESSMENT & PLAN NOTE
A1c 6.9%  Just started on metformin recently  Low-carb/sugar diet discussed    Again, she may benefit from SGLT2 inhibitor therapy if A1c remains above goal and she continues to have proteinuria.  Will continue to monitor closely

## 2025-07-30 NOTE — ASSESSMENT & PLAN NOTE
Baseline creatinine around 0.9-1.1, at baseline currently  Has 200 mg proteinuria now, albumin/creatinine 31 mcg/g    CKD likely 2/2 DM nephropathy + HTN nephrosclerosis + age-related renal function decline    Continue losartan 100 mg daily  She was just started on metformin, monitor A1c closely.  May benefit from SGLT2 inhibitor therapy if A1c remains above goal and she continues to have proteinuria.  Will continue to monitor    Sodium 147, advised to increase water intake  Avoidance of NSAIDs and other nephrotoxins discussed    RBUS completed May 2025 shows bilateral age-related cortical thinning and increased echogenicity.  There is an upper pole cortical cyst measuring 2.2 cm in the left kidney.  No further testing or follow-up needed at this time    Will repeat labs and workup CKD as below

## 2025-07-30 NOTE — LETTER
July 30, 2025        Katiuska Foster MD  [address]  [place] [address]             [facility]  [address]  [place] [address]  Phone: [phone]   Patient: Elina Paris   MR Number: [id number]   YOB: 1950   Date of Visit: 7/30/2025       Dear Dr. Foster:    Thank you for referring Elina Paris to me for evaluation. Below are the relevant portions of my assessment and plan of care.            If you have questions, please do not hesitate to call me. I look forward to following Elina along with you.    Sincerely,      Maral Kern, DO           CC  No Recipients

## 2025-07-30 NOTE — ASSESSMENT & PLAN NOTE
Continue furosemide 20 mg daily   low-salt diet discussed  Compression stockings when awake  Leg elevation when seated  Follow up with cardio (?CYNDI testing)

## 2025-07-30 NOTE — ASSESSMENT & PLAN NOTE
Has had several episodes of TRUDI in the past with her highest creatinine being 1.74 in April 2024 (unclear etiology)    Most recent episode of TRUDI has resolved

## 2025-08-11 ENCOUNTER — LAB VISIT (OUTPATIENT)
Dept: LAB | Facility: HOSPITAL | Age: 75
End: 2025-08-11
Attending: INTERNAL MEDICINE
Payer: MEDICARE

## 2025-08-11 DIAGNOSIS — N18.31 STAGE 3A CHRONIC KIDNEY DISEASE: ICD-10-CM

## 2025-08-11 DIAGNOSIS — R53.83 FATIGUE, UNSPECIFIED TYPE: ICD-10-CM

## 2025-08-11 LAB
25(OH)D3+25(OH)D2 SERPL-MCNC: 41 NG/ML (ref 30–80)
ALBUMIN SERPL-MCNC: 3.7 G/DL (ref 3.4–4.8)
ALBUMIN/GLOB SERPL: 1.4 RATIO (ref 1.1–2)
ALP SERPL-CCNC: 78 UNIT/L (ref 40–150)
ALT SERPL-CCNC: 14 UNIT/L (ref 0–55)
ANION GAP SERPL CALC-SCNC: 6 MEQ/L
AST SERPL-CCNC: 18 UNIT/L (ref 11–45)
BACTERIA #/AREA URNS AUTO: ABNORMAL /HPF
BASOPHILS # BLD AUTO: 0.03 X10(3)/MCL
BASOPHILS NFR BLD AUTO: 0.4 %
BILIRUB SERPL-MCNC: 0.7 MG/DL
BILIRUB UR QL STRIP.AUTO: NEGATIVE
BUN SERPL-MCNC: 20.8 MG/DL (ref 9.8–20.1)
CALCIUM SERPL-MCNC: 9.5 MG/DL (ref 8.4–10.2)
CHLORIDE SERPL-SCNC: 109 MMOL/L (ref 98–107)
CLARITY UR: ABNORMAL
CO2 SERPL-SCNC: 27 MMOL/L (ref 23–31)
COLOR UR AUTO: ABNORMAL
CREAT SERPL-MCNC: 1.02 MG/DL (ref 0.55–1.02)
CREAT UR-MCNC: 126 MG/DL (ref 45–106)
CREAT/UREA NIT SERPL: 20
EOSINOPHIL # BLD AUTO: 0.11 X10(3)/MCL (ref 0–0.9)
EOSINOPHIL NFR BLD AUTO: 1.4 %
ERYTHROCYTE [DISTWIDTH] IN BLOOD BY AUTOMATED COUNT: 16.1 % (ref 11.5–17)
GFR SERPLBLD CREATININE-BSD FMLA CKD-EPI: 57 ML/MIN/1.73/M2
GLOBULIN SER-MCNC: 2.7 GM/DL (ref 2.4–3.5)
GLUCOSE SERPL-MCNC: 110 MG/DL (ref 82–115)
GLUCOSE UR QL STRIP: NORMAL
HCT VFR BLD AUTO: 38.4 % (ref 37–47)
HGB BLD-MCNC: 11.6 G/DL (ref 12–16)
HGB UR QL STRIP: NEGATIVE
IMM GRANULOCYTES # BLD AUTO: 0.02 X10(3)/MCL (ref 0–0.04)
IMM GRANULOCYTES NFR BLD AUTO: 0.3 %
KETONES UR QL STRIP: NEGATIVE
LEUKOCYTE ESTERASE UR QL STRIP: 250
LYMPHOCYTES # BLD AUTO: 3.08 X10(3)/MCL (ref 0.6–4.6)
LYMPHOCYTES NFR BLD AUTO: 39.8 %
MAGNESIUM SERPL-MCNC: 2 MG/DL (ref 1.6–2.6)
MCH RBC QN AUTO: 27.2 PG (ref 27–31)
MCHC RBC AUTO-ENTMCNC: 30.2 G/DL (ref 33–36)
MCV RBC AUTO: 90.1 FL (ref 80–94)
MONOCYTES # BLD AUTO: 0.63 X10(3)/MCL (ref 0.1–1.3)
MONOCYTES NFR BLD AUTO: 8.2 %
MUCOUS THREADS URNS QL MICRO: ABNORMAL /LPF
NEUTROPHILS # BLD AUTO: 3.86 X10(3)/MCL (ref 2.1–9.2)
NEUTROPHILS NFR BLD AUTO: 49.9 %
NITRITE UR QL STRIP: NEGATIVE
NRBC BLD AUTO-RTO: 0 %
PH UR STRIP: 7 [PH]
PHOSPHATE SERPL-MCNC: 2.6 MG/DL (ref 2.3–4.7)
PLATELET # BLD AUTO: 246 X10(3)/MCL (ref 130–400)
PMV BLD AUTO: 10.5 FL (ref 7.4–10.4)
POTASSIUM SERPL-SCNC: 4.6 MMOL/L (ref 3.5–5.1)
PROT SERPL-MCNC: 6.4 GM/DL (ref 5.8–7.6)
PROT UR QL STRIP: ABNORMAL
PROT UR STRIP-MCNC: 18.6 MG/DL
RBC # BLD AUTO: 4.26 X10(6)/MCL (ref 4.2–5.4)
RBC #/AREA URNS AUTO: ABNORMAL /HPF
SODIUM SERPL-SCNC: 142 MMOL/L (ref 136–145)
SP GR UR STRIP.AUTO: 1.02 (ref 1–1.03)
SQUAMOUS #/AREA URNS LPF: ABNORMAL /HPF
URATE SERPL-MCNC: 5.7 MG/DL (ref 2.6–6)
URINE PROTEIN/CREATININE RATIO (OLG): 0.1
UROBILINOGEN UR STRIP-ACNC: NORMAL
WBC # BLD AUTO: 7.73 X10(3)/MCL (ref 4.5–11.5)
WBC #/AREA URNS AUTO: ABNORMAL /HPF

## 2025-08-11 PROCEDURE — 80074 ACUTE HEPATITIS PANEL: CPT

## 2025-08-11 PROCEDURE — 82306 VITAMIN D 25 HYDROXY: CPT

## 2025-08-11 PROCEDURE — 86160 COMPLEMENT ANTIGEN: CPT

## 2025-08-11 PROCEDURE — 86160 COMPLEMENT ANTIGEN: CPT | Mod: 59

## 2025-08-11 PROCEDURE — 87389 HIV-1 AG W/HIV-1&-2 AB AG IA: CPT

## 2025-08-11 PROCEDURE — 86255 FLUORESCENT ANTIBODY SCREEN: CPT | Mod: 91

## 2025-08-11 PROCEDURE — 83520 IMMUNOASSAY QUANT NOS NONAB: CPT

## 2025-08-11 PROCEDURE — 86036 ANCA SCREEN EACH ANTIBODY: CPT | Mod: 59

## 2025-08-11 PROCEDURE — 86036 ANCA SCREEN EACH ANTIBODY: CPT

## 2025-08-11 PROCEDURE — 86255 FLUORESCENT ANTIBODY SCREEN: CPT | Mod: 59

## 2025-08-11 PROCEDURE — 83735 ASSAY OF MAGNESIUM: CPT

## 2025-08-11 PROCEDURE — 84100 ASSAY OF PHOSPHORUS: CPT

## 2025-08-11 PROCEDURE — 81001 URINALYSIS AUTO W/SCOPE: CPT

## 2025-08-11 PROCEDURE — 84550 ASSAY OF BLOOD/URIC ACID: CPT

## 2025-08-11 PROCEDURE — 80053 COMPREHEN METABOLIC PANEL: CPT

## 2025-08-11 PROCEDURE — 85025 COMPLETE CBC W/AUTO DIFF WBC: CPT

## 2025-08-11 PROCEDURE — 86255 FLUORESCENT ANTIBODY SCREEN: CPT

## 2025-08-11 PROCEDURE — 83521 IG LIGHT CHAINS FREE EACH: CPT

## 2025-08-11 PROCEDURE — 36415 COLL VENOUS BLD VENIPUNCTURE: CPT

## 2025-08-11 PROCEDURE — 82570 ASSAY OF URINE CREATININE: CPT

## 2025-08-12 LAB
C3 SERPL-MCNC: 129 MG/DL (ref 80–173)
C4 SERPL-MCNC: 32 MG/DL (ref 13–46)
HAV IGM SERPL QL IA: NONREACTIVE
HBV CORE IGM SERPL QL IA: NONREACTIVE
HBV SURFACE AG SERPL QL IA: NONREACTIVE
HCV AB SERPL QL IA: NONREACTIVE
HIV 1+2 AB+HIV1 P24 AG SERPL QL IA: NONREACTIVE
KAPPA LC FREE SER NEPH-MCNC: 4.79 MG/DL (ref 0.33–1.94)
KAPPA LC FREE/LAMBDA FREE SER NEPH: 0.71 {RATIO} (ref 0.26–1.65)
LAMBDA LC FREE SERPL-MCNC: 6.71 MG/DL (ref 0.57–2.63)

## 2025-08-13 LAB
C-ANCA TITR SER IF: NEGATIVE {TITER}
GBM QUANT (OHS): <1.5 U/ML
P-ANCA SER QL IF: NEGATIVE
PATH REV: NORMAL
THSD7A IGG SERPL QL IF: NEGATIVE

## 2025-08-14 LAB
PLA2R IGG SER IA-ACNC: <2 RU/ML
PLA2R IGG SERPL QL IF: NEGATIVE
THSD7A IGG SERPL QL IF: NEGATIVE

## 2025-08-20 ENCOUNTER — OFFICE VISIT (OUTPATIENT)
Dept: HEMATOLOGY/ONCOLOGY | Facility: CLINIC | Age: 75
End: 2025-08-20
Payer: MEDICARE

## 2025-08-20 ENCOUNTER — LAB VISIT (OUTPATIENT)
Dept: LAB | Facility: HOSPITAL | Age: 75
End: 2025-08-20
Attending: NURSE PRACTITIONER
Payer: MEDICARE

## 2025-08-20 VITALS
SYSTOLIC BLOOD PRESSURE: 174 MMHG | HEART RATE: 87 BPM | RESPIRATION RATE: 16 BRPM | TEMPERATURE: 99 F | DIASTOLIC BLOOD PRESSURE: 96 MMHG | WEIGHT: 197 LBS | BODY MASS INDEX: 36.25 KG/M2 | HEIGHT: 62 IN | OXYGEN SATURATION: 96 %

## 2025-08-20 DIAGNOSIS — M85.80 OSTEOPENIA, UNSPECIFIED LOCATION: ICD-10-CM

## 2025-08-20 DIAGNOSIS — C50.512 MALIGNANT NEOPLASM OF LOWER-OUTER QUADRANT OF LEFT BREAST OF FEMALE, ESTROGEN RECEPTOR POSITIVE: ICD-10-CM

## 2025-08-20 DIAGNOSIS — C50.512 MALIGNANT NEOPLASM OF LOWER-OUTER QUADRANT OF LEFT BREAST OF FEMALE, ESTROGEN RECEPTOR POSITIVE: Primary | ICD-10-CM

## 2025-08-20 DIAGNOSIS — Z79.811 LONG TERM (CURRENT) USE OF AROMATASE INHIBITORS: ICD-10-CM

## 2025-08-20 DIAGNOSIS — Z17.0 MALIGNANT NEOPLASM OF LOWER-OUTER QUADRANT OF LEFT BREAST OF FEMALE, ESTROGEN RECEPTOR POSITIVE: ICD-10-CM

## 2025-08-20 DIAGNOSIS — Z17.0 MALIGNANT NEOPLASM OF LOWER-OUTER QUADRANT OF LEFT BREAST OF FEMALE, ESTROGEN RECEPTOR POSITIVE: Primary | ICD-10-CM

## 2025-08-20 LAB
ALBUMIN SERPL-MCNC: 3.7 G/DL (ref 3.4–4.8)
ALBUMIN/GLOB SERPL: 1.2 RATIO (ref 1.1–2)
ALP SERPL-CCNC: 74 UNIT/L (ref 40–150)
ALT SERPL-CCNC: 14 UNIT/L (ref 0–55)
ANION GAP SERPL CALC-SCNC: 6 MEQ/L
AST SERPL-CCNC: 20 UNIT/L (ref 11–45)
BASOPHILS # BLD AUTO: 0.01 X10(3)/MCL
BASOPHILS NFR BLD AUTO: 0.1 %
BILIRUB SERPL-MCNC: 0.6 MG/DL
BUN SERPL-MCNC: 21.8 MG/DL (ref 9.8–20.1)
CALCIUM SERPL-MCNC: 9.7 MG/DL (ref 8.4–10.2)
CEA SERPL-MCNC: <1.73 NG/ML (ref 0–3)
CHLORIDE SERPL-SCNC: 112 MMOL/L (ref 98–107)
CO2 SERPL-SCNC: 27 MMOL/L (ref 23–31)
CREAT SERPL-MCNC: 1.08 MG/DL (ref 0.55–1.02)
CREAT/UREA NIT SERPL: 20
EOSINOPHIL # BLD AUTO: 0.08 X10(3)/MCL (ref 0–0.9)
EOSINOPHIL NFR BLD AUTO: 1 %
ERYTHROCYTE [DISTWIDTH] IN BLOOD BY AUTOMATED COUNT: 15.3 % (ref 11.5–17)
GFR SERPLBLD CREATININE-BSD FMLA CKD-EPI: 54 ML/MIN/1.73/M2
GLOBULIN SER-MCNC: 3.2 GM/DL (ref 2.4–3.5)
GLUCOSE SERPL-MCNC: 116 MG/DL (ref 82–115)
HCT VFR BLD AUTO: 35.6 % (ref 37–47)
HGB BLD-MCNC: 11.5 G/DL (ref 12–16)
IMM GRANULOCYTES # BLD AUTO: 0.01 X10(3)/MCL (ref 0–0.04)
IMM GRANULOCYTES NFR BLD AUTO: 0.1 %
LYMPHOCYTES # BLD AUTO: 3.05 X10(3)/MCL (ref 0.6–4.6)
LYMPHOCYTES NFR BLD AUTO: 36.4 %
MCH RBC QN AUTO: 28.3 PG (ref 27–31)
MCHC RBC AUTO-ENTMCNC: 32.3 G/DL (ref 33–36)
MCV RBC AUTO: 87.5 FL (ref 80–94)
MONOCYTES # BLD AUTO: 0.67 X10(3)/MCL (ref 0.1–1.3)
MONOCYTES NFR BLD AUTO: 8 %
NEUTROPHILS # BLD AUTO: 4.57 X10(3)/MCL (ref 2.1–9.2)
NEUTROPHILS NFR BLD AUTO: 54.4 %
PLATELET # BLD AUTO: 199 X10(3)/MCL (ref 130–400)
PMV BLD AUTO: 9.4 FL (ref 7.4–10.4)
POTASSIUM SERPL-SCNC: 4.5 MMOL/L (ref 3.5–5.1)
PROT SERPL-MCNC: 6.9 GM/DL (ref 5.8–7.6)
RBC # BLD AUTO: 4.07 X10(6)/MCL (ref 4.2–5.4)
SODIUM SERPL-SCNC: 145 MMOL/L (ref 136–145)
WBC # BLD AUTO: 8.39 X10(3)/MCL (ref 4.5–11.5)

## 2025-08-20 PROCEDURE — 4010F ACE/ARB THERAPY RXD/TAKEN: CPT | Mod: CPTII,S$GLB,, | Performed by: INTERNAL MEDICINE

## 2025-08-20 PROCEDURE — G2211 COMPLEX E/M VISIT ADD ON: HCPCS | Mod: S$GLB,,, | Performed by: INTERNAL MEDICINE

## 2025-08-20 PROCEDURE — 99214 OFFICE O/P EST MOD 30 MIN: CPT | Mod: S$GLB,,, | Performed by: INTERNAL MEDICINE

## 2025-08-20 PROCEDURE — 3066F NEPHROPATHY DOC TX: CPT | Mod: CPTII,S$GLB,, | Performed by: INTERNAL MEDICINE

## 2025-08-20 PROCEDURE — 3077F SYST BP >= 140 MM HG: CPT | Mod: CPTII,S$GLB,, | Performed by: INTERNAL MEDICINE

## 2025-08-20 PROCEDURE — 99999 PR PBB SHADOW E&M-EST. PATIENT-LVL IV: CPT | Mod: PBBFAC,,, | Performed by: INTERNAL MEDICINE

## 2025-08-20 PROCEDURE — 85025 COMPLETE CBC W/AUTO DIFF WBC: CPT

## 2025-08-20 PROCEDURE — 3080F DIAST BP >= 90 MM HG: CPT | Mod: CPTII,S$GLB,, | Performed by: INTERNAL MEDICINE

## 2025-08-20 PROCEDURE — 1101F PT FALLS ASSESS-DOCD LE1/YR: CPT | Mod: CPTII,S$GLB,, | Performed by: INTERNAL MEDICINE

## 2025-08-20 PROCEDURE — 1160F RVW MEDS BY RX/DR IN RCRD: CPT | Mod: CPTII,S$GLB,, | Performed by: INTERNAL MEDICINE

## 2025-08-20 PROCEDURE — 86300 IMMUNOASSAY TUMOR CA 15-3: CPT

## 2025-08-20 PROCEDURE — 1159F MED LIST DOCD IN RCRD: CPT | Mod: CPTII,S$GLB,, | Performed by: INTERNAL MEDICINE

## 2025-08-20 PROCEDURE — 82378 CARCINOEMBRYONIC ANTIGEN: CPT

## 2025-08-20 PROCEDURE — 36415 COLL VENOUS BLD VENIPUNCTURE: CPT

## 2025-08-20 PROCEDURE — 80053 COMPREHEN METABOLIC PANEL: CPT

## 2025-08-20 PROCEDURE — 3288F FALL RISK ASSESSMENT DOCD: CPT | Mod: CPTII,S$GLB,, | Performed by: INTERNAL MEDICINE

## 2025-08-21 LAB — CANCER AG15-3 SERPL IA-ACNC: 15 U/ML

## 2025-09-03 ENCOUNTER — OFFICE VISIT (OUTPATIENT)
Dept: NEPHROLOGY | Facility: CLINIC | Age: 75
End: 2025-09-03
Payer: MEDICARE

## 2025-09-03 VITALS
HEART RATE: 86 BPM | HEIGHT: 62 IN | BODY MASS INDEX: 36.25 KG/M2 | OXYGEN SATURATION: 97 % | WEIGHT: 197 LBS | DIASTOLIC BLOOD PRESSURE: 101 MMHG | SYSTOLIC BLOOD PRESSURE: 172 MMHG | TEMPERATURE: 98 F | RESPIRATION RATE: 18 BRPM

## 2025-09-03 DIAGNOSIS — N18.31 STAGE 3A CHRONIC KIDNEY DISEASE: Primary | ICD-10-CM

## 2025-09-03 DIAGNOSIS — I10 PRIMARY HYPERTENSION: ICD-10-CM

## 2025-09-03 DIAGNOSIS — R60.0 BILATERAL LEG EDEMA: ICD-10-CM

## 2025-09-03 DIAGNOSIS — E11.22 TYPE 2 DIABETES MELLITUS WITH DIABETIC CHRONIC KIDNEY DISEASE, UNSPECIFIED CKD STAGE, UNSPECIFIED WHETHER LONG TERM INSULIN USE: ICD-10-CM

## 2025-09-03 PROBLEM — N17.9 AKI (ACUTE KIDNEY INJURY): Status: RESOLVED | Noted: 2025-07-30 | Resolved: 2025-09-03

## 2025-09-03 PROCEDURE — 3080F DIAST BP >= 90 MM HG: CPT | Mod: CPTII,S$GLB,, | Performed by: INTERNAL MEDICINE

## 2025-09-03 PROCEDURE — 3288F FALL RISK ASSESSMENT DOCD: CPT | Mod: CPTII,S$GLB,, | Performed by: INTERNAL MEDICINE

## 2025-09-03 PROCEDURE — 3077F SYST BP >= 140 MM HG: CPT | Mod: CPTII,S$GLB,, | Performed by: INTERNAL MEDICINE

## 2025-09-03 PROCEDURE — 3066F NEPHROPATHY DOC TX: CPT | Mod: CPTII,S$GLB,, | Performed by: INTERNAL MEDICINE

## 2025-09-03 PROCEDURE — 1160F RVW MEDS BY RX/DR IN RCRD: CPT | Mod: CPTII,S$GLB,, | Performed by: INTERNAL MEDICINE

## 2025-09-03 PROCEDURE — 4010F ACE/ARB THERAPY RXD/TAKEN: CPT | Mod: CPTII,S$GLB,, | Performed by: INTERNAL MEDICINE

## 2025-09-03 PROCEDURE — 1159F MED LIST DOCD IN RCRD: CPT | Mod: CPTII,S$GLB,, | Performed by: INTERNAL MEDICINE

## 2025-09-03 PROCEDURE — 99214 OFFICE O/P EST MOD 30 MIN: CPT | Mod: S$GLB,,, | Performed by: INTERNAL MEDICINE

## 2025-09-03 PROCEDURE — 1101F PT FALLS ASSESS-DOCD LE1/YR: CPT | Mod: CPTII,S$GLB,, | Performed by: INTERNAL MEDICINE

## 2025-09-03 PROCEDURE — 99999 PR PBB SHADOW E&M-EST. PATIENT-LVL V: CPT | Mod: PBBFAC,,, | Performed by: INTERNAL MEDICINE
